# Patient Record
Sex: MALE | Race: WHITE | NOT HISPANIC OR LATINO | Employment: OTHER | URBAN - METROPOLITAN AREA
[De-identification: names, ages, dates, MRNs, and addresses within clinical notes are randomized per-mention and may not be internally consistent; named-entity substitution may affect disease eponyms.]

---

## 2017-08-17 ENCOUNTER — OFFICE VISIT (OUTPATIENT)
Dept: FAMILY MEDICINE CLINIC | Facility: CLINIC | Age: 62
End: 2017-08-17

## 2017-08-17 VITALS
SYSTOLIC BLOOD PRESSURE: 118 MMHG | HEIGHT: 67 IN | WEIGHT: 168 LBS | DIASTOLIC BLOOD PRESSURE: 72 MMHG | OXYGEN SATURATION: 96 % | HEART RATE: 82 BPM | BODY MASS INDEX: 26.37 KG/M2

## 2017-08-17 DIAGNOSIS — E78.5 HYPERLIPIDEMIA, UNSPECIFIED HYPERLIPIDEMIA TYPE: ICD-10-CM

## 2017-08-17 DIAGNOSIS — Z87.898 HISTORY OF ELEVATED PSA: ICD-10-CM

## 2017-08-17 DIAGNOSIS — L25.9 CONTACT DERMATITIS, UNSPECIFIED CONTACT DERMATITIS TYPE, UNSPECIFIED TRIGGER: Primary | ICD-10-CM

## 2017-08-17 DIAGNOSIS — Z86.39 HISTORY OF ELEVATED GLUCOSE: ICD-10-CM

## 2017-08-17 DIAGNOSIS — E03.9 ACQUIRED HYPOTHYROIDISM: ICD-10-CM

## 2017-08-17 PROCEDURE — 99203 OFFICE O/P NEW LOW 30 MIN: CPT | Performed by: FAMILY MEDICINE

## 2017-08-17 RX ORDER — TRIAMCINOLONE ACETONIDE 5 MG/G
OINTMENT TOPICAL 2 TIMES DAILY
Qty: 15 G | Refills: 1 | Status: SHIPPED | OUTPATIENT
Start: 2017-08-17 | End: 2017-10-02

## 2017-08-17 NOTE — PATIENT INSTRUCTIONS
Contact Dermatitis  Dermatitis is redness, soreness, and swelling (inflammation) of the skin. Contact dermatitis is a reaction to certain substances that touch the skin. There are two types of contact dermatitis:   · Irritant contact dermatitis. This type is caused by something that irritates your skin, such as dry hands from washing them too much. This type does not require previous exposure to the substance for a reaction to occur. This type is more common.  · Allergic contact dermatitis. This type is caused by a substance that you are allergic to, such as a nickel allergy or poison ivy. This type only occurs if you have been exposed to the substance (allergen) before. Upon a repeat exposure, your body reacts to the substance. This type is less common.  CAUSES   Many different substances can cause contact dermatitis. Irritant contact dermatitis is most commonly caused by exposure to:   · Makeup.    · Soaps.    · Detergents.    · Bleaches.    · Acids.    · Metal salts, such as nickel.    Allergic contact dermatitis is most commonly caused by exposure to:   · Poisonous plants.    · Chemicals.    · Jewelry.    · Latex.    · Medicines.    · Preservatives in products, such as clothing.    RISK FACTORS  This condition is more likely to develop in:   · People who have jobs that expose them to irritants or allergens.  · People who have certain medical conditions, such as asthma or eczema.    SYMPTOMS   Symptoms of this condition may occur anywhere on your body where the irritant has touched you or is touched by you. Symptoms include:  · Dryness or flaking.    · Redness.    · Cracks.    · Itching.    · Pain or a burning feeling.    · Blisters.  · Drainage of small amounts of blood or clear fluid from skin cracks.  With allergic contact dermatitis, there may also be swelling in areas such as the eyelids, mouth, or genitals.   DIAGNOSIS   This condition is diagnosed with a medical history and physical exam. A patch skin test  may be performed to help determine the cause. If the condition is related to your job, you may need to see an occupational medicine specialist.  TREATMENT  Treatment for this condition includes figuring out what caused the reaction and protecting your skin from further contact. Treatment may also include:   · Steroid creams or ointments. Oral steroid medicines may be needed in more severe cases.  · Antibiotics or antibacterial ointments, if a skin infection is present.  · Antihistamine lotion or an antihistamine taken by mouth to ease itching.  · A bandage (dressing).  HOME CARE INSTRUCTIONS  Skin Care   · Moisturize your skin as needed.    · Apply cool compresses to the affected areas.  · Try taking a bath with:    Epsom salts. Follow the instructions on the packaging. You can get these at your local pharmacy or grocery store.    Baking soda. Pour a small amount into the bath as directed by your health care provider.    Colloidal oatmeal. Follow the instructions on the packaging. You can get this at your local pharmacy or grocery store.  · Try applying baking soda paste to your skin. Stir water into baking soda until it reaches a paste-like consistency.  · Do not scratch your skin.  · Bathe less frequently, such as every other day.  · Bathe in lukewarm water. Avoid using hot water.  Medicines   · Take or apply over-the-counter and prescription medicines only as told by your health care provider.    · If you were prescribed an antibiotic medicine, take or apply your antibiotic as told by your health care provider. Do not stop using the antibiotic even if your condition starts to improve.  General Instructions   · Keep all follow-up visits as told by your health care provider. This is important.  · Avoid the substance that caused your reaction. If you do not know what caused it, keep a journal to try to track what caused it. Write down:    What you eat.    What cosmetic products you use.    What you drink.    What  you wear in the affected area. This includes jewelry.  · If you were given a dressing, take care of it as told by your health care provider. This includes when to change and remove it.  SEEK MEDICAL CARE IF:   · Your condition does not improve with treatment.  · Your condition gets worse.  · You have signs of infection such as swelling, tenderness, redness, soreness, or warmth in the affected area.  · You have a fever.  · You have new symptoms.  SEEK IMMEDIATE MEDICAL CARE IF:   · You have a severe headache, neck pain, or neck stiffness.  · You vomit.  · You feel very sleepy.  · You notice red streaks coming from the affected area.  · Your bone or joint underneath the affected area becomes painful after the skin has healed.  · The affected area turns darker.  · You have difficulty breathing.     This information is not intended to replace advice given to you by your health care provider. Make sure you discuss any questions you have with your health care provider.     Document Released: 12/15/2001 Document Revised: 09/07/2016 Document Reviewed: 05/04/2016  [employer] Interactive Patient Education ©2017 [employer]

## 2017-08-17 NOTE — PROGRESS NOTES
Subjective   Arash Roman is a 61 y.o. male. Patient is here today to establish care and this is the time of year he usually has his annual physical. Although he has a rash of unknown origin x2 months.    Rash: itchy red patch located on the R ankle x 2 months duration. Neosporin and avoiding socks have not helped.      Follow up from [facility]: pt went to [facility] on 7/13/17 for bronchitis.  He was treated with antibiotics and feels like his breathing has returned to normal now.      Hx of Depression, but mood stable now.  He took medication briefly about 1 yr ago, but no longer feels that this is necessary.  He has also completed a course of counseling.  NO SI or HI.  Comorbidities include hypothyroidism for which he takes Synthroid.      HLD: Did not tolerate Simvastatin 2/2 myalgias, but has tolerated Crestor well in the past.  He has noticed myalgias since switching to generic Crestor 1 yr ago and would like to get labs checked today before deciding whether to continue a statin.      Colonoscopy last year discovered some polyps; he usually gets a colonoscopy Q 2-3 yr  Last physical exam and labs were done in October 2017 at which time he had mildly elevated fasting glucose.  He also has a hx of a mild elevation in PSA, and requests having this lab value repeated.    Walks for exercise most days and does calisthenics for his lower back.  He enjoys exercising outdoors more than working out in a gym.      Chief Complaint   Patient presents with   • Establish Care     PCP changed practices   • Rash     right ankle and back of neck x2 months   • [facility] follow up          Vitals:    08/17/17 1453   BP: 118/72   Pulse: 82   SpO2: 96%     The following portions of the patient's history were reviewed and updated as appropriate: allergies, current medications, past family history, past medical history, past social history, past surgical history and problem list.    Past Medical History:   Diagnosis Date   •  Depression    • Hypercholesteremia    • Hypothyroidism    • Peyronie's disease    • Rising PSA level      Past Surgical History:   Procedure Laterality Date   • COLONOSCOPY N/A 5/12/2016    Procedure: COLONOSCOPY to cecum/TI; polypectomy(cold bx);  Surgeon: Juno Steven MD;  Location: [facility] ENDOSCOPY;  Service:    • TONSILLECTOMY       Family History   Problem Relation Age of Onset   • Colon polyps Father    • Kidney disease Mother    • Alcohol abuse Mother      Social History     Social History   • Marital status:      Spouse name: N/A   • Number of children: N/A   • Years of education: N/A     Occupational History   • Not on file.     Social History Main Topics   • Smoking status: Never Smoker   • Smokeless tobacco: Never Used   • Alcohol use Yes      Comment: socially, 1-3 x per week   • Drug use: Defer   • Sexual activity: Yes     Partners: Female     Other Topics Concern   • Not on file     Social History Narrative    Lives at home with his wife and a Kasey Barrios named Fatemeh.  Works as an ,  at [employer].        History   Sexual Activity   • Sexual activity: Yes   • Partners: Female       No Known Allergies     New Medications Ordered This Visit   Medications   • triamcinolone (KENALOG) 0.5 % ointment     Sig: Apply  topically 2 (Two) Times a Day.     Dispense:  15 g     Refill:  1     Current Outpatient Prescriptions on File Prior to Visit   Medication Sig Dispense Refill   • Azelastine-Fluticasone 137-50 MCG/ACT suspension 1 spray into each nostril daily. (Patient taking differently: 1 spray into each nostril Daily As Needed.) 23 g 11   • levothyroxine (SYNTHROID, LEVOTHROID) 50 MCG tablet TAKE 1 TABLET BY MOUTH DAILY 30 tablet 5   • rosuvastatin (CRESTOR) 5 MG tablet TAKE 1 TABLET BY MOUTH EVERY DAY 90 tablet 3     No current facility-administered medications on file prior to visit.        Objective     Rash   Pertinent negatives include no congestion, cough, fatigue,  fever, rhinorrhea, shortness of breath or sore throat.        Review of Systems   Constitutional: Negative for appetite change, fatigue, fever and unexpected weight change.   HENT: Negative for congestion, rhinorrhea and sore throat.    Respiratory: Negative for cough, chest tightness and shortness of breath.    Cardiovascular: Negative for chest pain and palpitations.   Gastrointestinal: Negative for abdominal pain, blood in stool and nausea.   Musculoskeletal: Positive for myalgias. Negative for gait problem.   Skin: Positive for rash. Negative for pallor.   Neurological: Negative for dizziness, weakness and headaches.   Psychiatric/Behavioral: Negative for dysphoric mood, sleep disturbance and suicidal ideas. The patient is not nervous/anxious.    All other systems reviewed and are negative.      Physical Exam   Constitutional: He is oriented to person, place, and time. Vital signs are normal. He appears well-developed and well-nourished. No distress.   HENT:   Head: Normocephalic and atraumatic.   Nose: Nose normal.   Mouth/Throat: Oropharynx is clear and moist.   Eyes: Conjunctivae are normal. Pupils are equal, round, and reactive to light.   Neck: Normal range of motion. Neck supple. No thyromegaly present.   Cardiovascular: Normal rate, regular rhythm, S1 normal, S2 normal and normal heart sounds.  Exam reveals no gallop.    No murmur heard.  Pulses:       Radial pulses are 2+ on the right side        Dorsalis pedis pulses are 2+ on the right side   Pulmonary/Chest: Effort normal and breath sounds normal. He has no wheezes. He has no rales. He exhibits no tenderness.   Abdominal: Soft. Bowel sounds are normal. He exhibits no distension. There is no hepatosplenomegaly. There is no tenderness. There is no rebound and no guarding.   Musculoskeletal: He exhibits no edema.   Lymphadenopathy:     He has no cervical adenopathy.   Neurological: He is alert and oriented to person, place, and time. He has normal  strength. Gait normal.   Skin: Skin is warm and dry. No cyanosis. Nails show no clubbing.   Well demarcated erythematous patch over lateral aspect of R ankle with scattered peripheral excoriations   Psychiatric: He has a normal mood and affect. His speech is normal and behavior is normal.   Nursing note and vitals reviewed.      ASSESSMENT/PLAN     Problem List Items Addressed This Visit        Cardiovascular and Mediastinum    Hyperlipidemia    Relevant Orders    Lipid Panel With LDL / HDL Ratio       Endocrine    Hypothyroidism    Relevant Orders    TSH      Other Visit Diagnoses     Contact dermatitis, unspecified contact dermatitis type, unspecified trigger    -  Primary    Relevant Medications    triamcinolone (KENALOG) 0.5 % ointment    History of elevated glucose        Relevant Orders    Comprehensive Metabolic Panel    Hemoglobin A1c    History of elevated PSA        Relevant Orders    PSA          Patient Instructions   Contact Dermatitis  Dermatitis is redness, soreness, and swelling (inflammation) of the skin. Contact dermatitis is a reaction to certain substances that touch the skin. There are two types of contact dermatitis:   · Irritant contact dermatitis. This type is caused by something that irritates your skin, such as dry hands from washing them too much. This type does not require previous exposure to the substance for a reaction to occur. This type is more common.  · Allergic contact dermatitis. This type is caused by a substance that you are allergic to, such as a nickel allergy or poison ivy. This type only occurs if you have been exposed to the substance (allergen) before. Upon a repeat exposure, your body reacts to the substance. This type is less common.  CAUSES   Many different substances can cause contact dermatitis. Irritant contact dermatitis is most commonly caused by exposure to:   · Makeup.    · Soaps.    · Detergents.    · Bleaches.    · Acids.    · Metal salts, such as nickel.     Allergic contact dermatitis is most commonly caused by exposure to:   · Poisonous plants.    · Chemicals.    · Jewelry.    · Latex.    · Medicines.    · Preservatives in products, such as clothing.    RISK FACTORS  This condition is more likely to develop in:   · People who have jobs that expose them to irritants or allergens.  · People who have certain medical conditions, such as asthma or eczema.    SYMPTOMS   Symptoms of this condition may occur anywhere on your body where the irritant has touched you or is touched by you. Symptoms include:  · Dryness or flaking.    · Redness.    · Cracks.    · Itching.    · Pain or a burning feeling.    · Blisters.  · Drainage of small amounts of blood or clear fluid from skin cracks.  With allergic contact dermatitis, there may also be swelling in areas such as the eyelids, mouth, or genitals.   DIAGNOSIS   This condition is diagnosed with a medical history and physical exam. A patch skin test may be performed to help determine the cause. If the condition is related to your job, you may need to see an occupational medicine specialist.  TREATMENT  Treatment for this condition includes figuring out what caused the reaction and protecting your skin from further contact. Treatment may also include:   · Steroid creams or ointments. Oral steroid medicines may be needed in more severe cases.  · Antibiotics or antibacterial ointments, if a skin infection is present.  · Antihistamine lotion or an antihistamine taken by mouth to ease itching.  · A bandage (dressing).  HOME CARE INSTRUCTIONS  Skin Care   · Moisturize your skin as needed.    · Apply cool compresses to the affected areas.  · Try taking a bath with:    Epsom salts. Follow the instructions on the packaging. You can get these at your local pharmacy or grocery store.    Baking soda. Pour a small amount into the bath as directed by your health care provider.    Colloidal oatmeal. Follow the instructions on the packaging. You  can get this at your local pharmacy or grocery store.  · Try applying baking soda paste to your skin. Stir water into baking soda until it reaches a paste-like consistency.  · Do not scratch your skin.  · Bathe less frequently, such as every other day.  · Bathe in lukewarm water. Avoid using hot water.  Medicines   · Take or apply over-the-counter and prescription medicines only as told by your health care provider.    · If you were prescribed an antibiotic medicine, take or apply your antibiotic as told by your health care provider. Do not stop using the antibiotic even if your condition starts to improve.  General Instructions   · Keep all follow-up visits as told by your health care provider. This is important.  · Avoid the substance that caused your reaction. If you do not know what caused it, keep a journal to try to track what caused it. Write down:    What you eat.    What cosmetic products you use.    What you drink.    What you wear in the affected area. This includes jewelry.  · If you were given a dressing, take care of it as told by your health care provider. This includes when to change and remove it.  SEEK MEDICAL CARE IF:   · Your condition does not improve with treatment.  · Your condition gets worse.  · You have signs of infection such as swelling, tenderness, redness, soreness, or warmth in the affected area.  · You have a fever.  · You have new symptoms.  SEEK IMMEDIATE MEDICAL CARE IF:   · You have a severe headache, neck pain, or neck stiffness.  · You vomit.  · You feel very sleepy.  · You notice red streaks coming from the affected area.  · Your bone or joint underneath the affected area becomes painful after the skin has healed.  · The affected area turns darker.  · You have difficulty breathing.     This information is not intended to replace advice given to you by your health care provider. Make sure you discuss any questions you have with your health care provider.     Document Released:  12/15/2001 Document Revised: 09/07/2016 Document Reviewed: 05/04/2016  [employer] Interactive Patient Education ©2017 [employer]      Return in about 1 month (around 9/17/2017) for Annual.       Katty Watkins MD  08/20/17

## 2017-09-18 ENCOUNTER — TELEPHONE (OUTPATIENT)
Dept: FAMILY MEDICINE CLINIC | Facility: CLINIC | Age: 62
End: 2017-09-18

## 2017-09-18 RX ORDER — CLOBETASOL PROPIONATE 0.5 MG/G
OINTMENT TOPICAL 2 TIMES DAILY
Qty: 15 G | Refills: 0 | Status: SHIPPED | OUTPATIENT
Start: 2017-09-18 | End: 2019-07-01

## 2017-09-18 NOTE — TELEPHONE ENCOUNTER
Pt called to report triamcinolone cream is not helping his ankle rash.  He requests a different Rx be called in.  Electronic Rx sent for topical clobetasol.  Pt advised to call clinic in 1 week if rash not resolved, at which time we will refer to Dermatology.

## 2017-09-27 LAB
ALBUMIN SERPL-MCNC: 4.6 G/DL (ref 3.5–5.2)
ALBUMIN/GLOB SERPL: 1.9 G/DL
ALP SERPL-CCNC: 60 U/L (ref 39–117)
ALT SERPL-CCNC: 34 U/L (ref 1–41)
AST SERPL-CCNC: 18 U/L (ref 1–40)
BILIRUB SERPL-MCNC: 0.5 MG/DL (ref 0.1–1.2)
BUN SERPL-MCNC: 17 MG/DL (ref 8–23)
BUN/CREAT SERPL: 16.7 (ref 7–25)
CALCIUM SERPL-MCNC: 9.5 MG/DL (ref 8.6–10.5)
CHLORIDE SERPL-SCNC: 107 MMOL/L (ref 98–107)
CHOLEST SERPL-MCNC: 158 MG/DL (ref 0–200)
CO2 SERPL-SCNC: 24.7 MMOL/L (ref 22–29)
CREAT SERPL-MCNC: 1.02 MG/DL (ref 0.76–1.27)
GLOBULIN SER CALC-MCNC: 2.4 GM/DL
GLUCOSE SERPL-MCNC: 108 MG/DL (ref 65–99)
HBA1C MFR BLD: 5.1 % (ref 4.8–5.6)
HDLC SERPL-MCNC: 42 MG/DL (ref 40–60)
LDLC SERPL CALC-MCNC: 92 MG/DL (ref 0–100)
LDLC/HDLC SERPL: 2.2 {RATIO}
POTASSIUM SERPL-SCNC: 4.3 MMOL/L (ref 3.5–5.2)
PROT SERPL-MCNC: 7 G/DL (ref 6–8.5)
PSA SERPL-MCNC: 1.66 NG/ML (ref 0–4)
SODIUM SERPL-SCNC: 145 MMOL/L (ref 136–145)
TRIGL SERPL-MCNC: 118 MG/DL (ref 0–150)
TSH SERPL DL<=0.005 MIU/L-ACNC: 4.34 MIU/ML (ref 0.27–4.2)
VLDLC SERPL CALC-MCNC: 23.6 MG/DL (ref 5–40)

## 2017-09-29 ENCOUNTER — RESULTS ENCOUNTER (OUTPATIENT)
Dept: FAMILY MEDICINE CLINIC | Facility: CLINIC | Age: 62
End: 2017-09-29

## 2017-09-29 ENCOUNTER — TELEPHONE (OUTPATIENT)
Dept: FAMILY MEDICINE CLINIC | Facility: CLINIC | Age: 62
End: 2017-09-29

## 2017-09-29 DIAGNOSIS — E78.5 HYPERLIPIDEMIA, UNSPECIFIED HYPERLIPIDEMIA TYPE: ICD-10-CM

## 2017-09-29 DIAGNOSIS — Z86.39 HISTORY OF ELEVATED GLUCOSE: ICD-10-CM

## 2017-09-29 DIAGNOSIS — E03.9 ACQUIRED HYPOTHYROIDISM: ICD-10-CM

## 2017-09-29 DIAGNOSIS — Z87.898 HISTORY OF ELEVATED PSA: ICD-10-CM

## 2017-09-29 NOTE — TELEPHONE ENCOUNTER
Spoke with Mr. Roman about his lab and he expressed understanding and will be in the office on Monday for his appointment and have the follow up testing done at that time.

## 2017-09-29 NOTE — TELEPHONE ENCOUNTER
----- Message from Katty Watkins MD sent at 9/28/2017  3:07 PM EDT -----  Please call Mr. Roman and let him know that his thyroid hormone levels are a little out of balance, and that we will need to do some follow up blood work at his next appointment on 10/2/17.  His random blood sugar was a little high, but the hemoglobin A1C, which is a more sensitive test for diabetes was normal, so he is not a diabetic.  His prostate specific antigen level was normal and stable compared to last year.  Cholesterol, liver enzymes, and kidney function looked good.

## 2017-10-02 ENCOUNTER — OFFICE VISIT (OUTPATIENT)
Dept: FAMILY MEDICINE CLINIC | Facility: CLINIC | Age: 62
End: 2017-10-02

## 2017-10-02 VITALS
OXYGEN SATURATION: 98 % | WEIGHT: 165 LBS | SYSTOLIC BLOOD PRESSURE: 112 MMHG | HEART RATE: 70 BPM | HEIGHT: 67 IN | BODY MASS INDEX: 25.9 KG/M2 | DIASTOLIC BLOOD PRESSURE: 70 MMHG

## 2017-10-02 DIAGNOSIS — E03.9 HYPOTHYROIDISM, UNSPECIFIED TYPE: ICD-10-CM

## 2017-10-02 DIAGNOSIS — R21 RASH: ICD-10-CM

## 2017-10-02 DIAGNOSIS — Z00.00 ANNUAL PHYSICAL EXAM: Primary | ICD-10-CM

## 2017-10-02 DIAGNOSIS — Z23 NEED FOR VACCINATION: ICD-10-CM

## 2017-10-02 PROCEDURE — 90736 HZV VACCINE LIVE SUBQ: CPT | Performed by: FAMILY MEDICINE

## 2017-10-02 PROCEDURE — 90471 IMMUNIZATION ADMIN: CPT | Performed by: FAMILY MEDICINE

## 2017-10-02 PROCEDURE — 99396 PREV VISIT EST AGE 40-64: CPT | Performed by: FAMILY MEDICINE

## 2017-10-02 PROCEDURE — 90472 IMMUNIZATION ADMIN EACH ADD: CPT | Performed by: FAMILY MEDICINE

## 2017-10-02 PROCEDURE — 90686 IIV4 VACC NO PRSV 0.5 ML IM: CPT | Performed by: FAMILY MEDICINE

## 2017-10-02 RX ORDER — MELATONIN
1000 DAILY
COMMUNITY

## 2017-10-02 RX ORDER — CETIRIZINE HYDROCHLORIDE 10 MG/1
10 TABLET ORAL DAILY
COMMUNITY

## 2017-10-02 RX ORDER — LEVOTHYROXINE SODIUM 0.07 MG/1
75 TABLET ORAL DAILY
Qty: 30 TABLET | Refills: 2 | Status: SHIPPED | OUTPATIENT
Start: 2017-10-02 | End: 2017-12-26 | Stop reason: SDUPTHER

## 2017-10-02 NOTE — PATIENT INSTRUCTIONS
Hypothyroidism  Hypothyroidism is a disorder of the thyroid. The thyroid is a large gland that is located in the lower front of the neck. The thyroid releases hormones that control how the body works. With hypothyroidism, the thyroid does not make enough of these hormones.  CAUSES  Causes of hypothyroidism may include:  · Viral infections.  · Pregnancy.  · Your own defense system (immune system) attacking your thyroid.  · Certain medicines.  · Birth defects.  · Past radiation treatments to your head or neck.  · Past treatment with radioactive iodine.  · Past surgical removal of part or all of your thyroid.  · Problems with the gland that is located in the center of your brain (pituitary).  SIGNS AND SYMPTOMS  Signs and symptoms of hypothyroidism may include:  · Feeling as though you have no energy (lethargy).  · Inability to tolerate cold.  · Weight gain that is not explained by a change in diet or exercise habits.  · Dry skin.  · Coarse hair.  · Menstrual irregularity.  · Slowing of thought processes.  · Constipation.  · Sadness or depression.  DIAGNOSIS   Your health care provider may diagnose hypothyroidism with blood tests and ultrasound tests.  TREATMENT  Hypothyroidism is treated with medicine that replaces the hormones that your body does not make. After you begin treatment, it may take several weeks for symptoms to go away.  HOME CARE INSTRUCTIONS   · Take medicines only as directed by your health care provider.  · If you start taking any new medicines, tell your health care provider.  · Keep all follow-up visits as directed by your health care provider. This is important. As your condition improves, your dosage needs may change. You will need to have blood tests regularly so that your health care provider can watch your condition.  SEEK MEDICAL CARE IF:  · Your symptoms do not get better with treatment.  · You are taking thyroid replacement medicine and:    You sweat excessively.    You have tremors.    You  feel anxious.    You lose weight rapidly.    You cannot tolerate heat.    You have emotional swings.    You have diarrhea.    You feel weak.  SEEK IMMEDIATE MEDICAL CARE IF:   · You develop chest pain.  · You develop an irregular heartbeat.  · You develop a rapid heartbeat.     This information is not intended to replace advice given to you by your health care provider. Make sure you discuss any questions you have with your health care provider.     Document Released: 12/18/2006 Document Revised: 01/08/2016 Document Reviewed: 05/05/2015  [employer] Interactive Patient Education ©2017 [employer]      Preventive Care 40-64 Years, Male  Preventive care refers to lifestyle choices and visits with your health care provider that can promote health and wellness.  WHAT DOES PREVENTIVE CARE INCLUDE?  · A yearly physical exam. This is also called an annual well check.  · Dental exams once or twice a year.  · Routine eye exams. Ask your health care provider how often you should have your eyes checked.  · Personal lifestyle choices, including:    Daily care of your teeth and gums.    Regular physical activity.    Eating a healthy diet.    Avoiding tobacco and drug use.    Limiting alcohol use.    Practicing safe sex.    Taking low-dose aspirin every day starting at age 50.  WHAT HAPPENS DURING AN ANNUAL WELL CHECK?  The services and screenings done by your health care provider during your annual well check will depend on your age, overall health, lifestyle risk factors, and family history of disease.  Counseling  Your health care provider may ask you questions about your:  · Alcohol use.  · Tobacco use.  · Drug use.  · Emotional well-being.  · Home and relationship well-being.  · Sexual activity.  · Eating habits.  · Work and work environment.  Screening  You may have the following tests or measurements:  · Height, weight, and BMI.  · Blood pressure.  · Lipid and cholesterol levels. These may be checked every 5 years, or more  frequently if you are over 50 years old.  · Skin check.  · Lung cancer screening. You may have this screening every year starting at age 55 if you have a 30-pack-year history of smoking and currently smoke or have quit within the past 15 years.  · Fecal occult blood test (FOBT) of the stool. You may have this test every year starting at age 50.  · Flexible sigmoidoscopy or colonoscopy. You may have a sigmoidoscopy every 5 years or a colonoscopy every 10 years starting at age 50.  · Prostate cancer screening. Recommendations will vary depending on your family history and other risks.  · Hepatitis C blood test.  · Hepatitis B blood test.  · Sexually transmitted disease (STD) testing.  · Diabetes screening. This is done by checking your blood sugar (glucose) after you have not eaten for a while (fasting). You may have this done every 1-3 years.  Discuss your test results, treatment options, and if necessary, the need for more tests with your health care provider.  Vaccines  Your health care provider may recommend certain vaccines, such as:  · Influenza vaccine. This is recommended every year.  · Tetanus, diphtheria, and acellular pertussis (Tdap, Td) vaccine. You may need a Td booster every 10 years.  · Varicella vaccine. You may need this if you have not been vaccinated.  · Zoster vaccine. You may need this after age 60.  · Measles, mumps, and rubella (MMR) vaccine. You may need at least one dose of MMR if you were born in 1957 or later. You may also need a second dose.  · Pneumococcal 13-valent conjugate (PCV13) vaccine. You may need this if you have certain conditions and have not been vaccinated.  · Pneumococcal polysaccharide (PPSV23) vaccine. You may need one or two doses if you smoke cigarettes or if you have certain conditions.  · Meningococcal vaccine. You may need this if you have certain conditions.  · Hepatitis A vaccine. You may need this if you have certain conditions or if you travel or work in places  where you may be exposed to hepatitis A.  · Hepatitis B vaccine. You may need this if you have certain conditions or if you travel or work in places where you may be exposed to hepatitis B.  · Haemophilus influenzae type b (Hib) vaccine. You may need this if you have certain risk factors.  Talk to your health care provider about which screenings and vaccines you need and how often you need them.     This information is not intended to replace advice given to you by your health care provider. Make sure you discuss any questions you have with your health care provider.     Document Released: 01/13/2017 Document Reviewed: 01/13/2017  [employer] Interactive Patient Education ©2017 [employer]

## 2017-10-12 ENCOUNTER — HOSPITAL ENCOUNTER (OUTPATIENT)
Dept: ULTRASOUND IMAGING | Facility: HOSPITAL | Age: 62
Discharge: HOME | End: 2017-10-12
Admitting: FAMILY MEDICINE

## 2017-10-12 DIAGNOSIS — E03.9 HYPOTHYROIDISM, UNSPECIFIED TYPE: ICD-10-CM

## 2017-10-12 PROCEDURE — 76536 US EXAM OF HEAD AND NECK: CPT

## 2017-12-18 ENCOUNTER — OFFICE VISIT (OUTPATIENT)
Dept: FAMILY MEDICINE CLINIC | Facility: CLINIC | Age: 62
End: 2017-12-18

## 2017-12-18 VITALS
HEIGHT: 67 IN | OXYGEN SATURATION: 98 % | SYSTOLIC BLOOD PRESSURE: 120 MMHG | BODY MASS INDEX: 26.68 KG/M2 | WEIGHT: 170 LBS | HEART RATE: 72 BPM | DIASTOLIC BLOOD PRESSURE: 70 MMHG

## 2017-12-18 DIAGNOSIS — M75.02 ADHESIVE CAPSULITIS OF LEFT SHOULDER: ICD-10-CM

## 2017-12-18 DIAGNOSIS — E03.9 ACQUIRED HYPOTHYROIDISM: Primary | ICD-10-CM

## 2017-12-18 PROCEDURE — 99213 OFFICE O/P EST LOW 20 MIN: CPT | Performed by: FAMILY MEDICINE

## 2017-12-18 NOTE — PROGRESS NOTES
Subjective   Arash Roman is a 62 y.o. male here for 2MO. re-evaluation for hypothyroidism and left shoulder pain.  Patient states since we adjusted his medication 2MO. ago he has noticed an increase in energy, but he is still gaining weight.  Patient complains of left shoulder pain when lifting or moving his arm. Patient states he develops a sharp pain when moving certain directions. He hasn't taken any OTC medications to help with his symptoms.    Chief Complaint   Patient presents with   • Hypothyroidism   • Shoulder Pain       HPI     Hypothyroidism:  -levothyroxine dose increased from 50 to 75 mg daily about 2 months ago  -energy improved, but could still be better  -he has been needing fewer naps   -however weight gain has continued  -thyroid US showed nonspecific heterogeneity but NO nodules on 10/2/17    L shoulder pain:  -sharp intermittent lateral shoulder pain  -worsened with putting on a jacket or shirt  -no acute injuries or repetetive motion stress  -he has a hx of frozen shoulder on the R side for which he was treated by a Sports Medicine Physician about 3 yr ago with manipulation under anasthesia    Leg rash essentially resolved with a prolonged course of topical Clobetasol and regular moisturization.  Pt is following up with Dermatology regularly       Review of Systems   Constitutional: Positive for fatigue and unexpected weight change (5 lb gain). Negative for fever.   HENT: Negative for congestion and sore throat.    Respiratory: Negative for cough and shortness of breath.    Cardiovascular: Negative for chest pain and leg swelling.   Gastrointestinal: Negative for abdominal pain and nausea.   Musculoskeletal: Positive for arthralgias (L shoulder pain). Negative for back pain, joint swelling, myalgias, neck pain and neck stiffness.        Shoulder Pain.   Skin: Negative for rash and wound.   Neurological: Negative for dizziness and light-headedness.   All other systems reviewed and are  negative.      The following portions of the patient's history were reviewed and updated as appropriate: allergies, current medications, past family history, past medical history, past social history, past surgical history and problem list.    Past Medical History:   Diagnosis Date   • Depression    • Frozen shoulder    • Hypercholesteremia    • Hypothyroidism    • Peyronie's disease      Past Surgical History:   Procedure Laterality Date   • COLONOSCOPY N/A 5/12/2016    Procedure: COLONOSCOPY to cecum/TI; polypectomy(cold bx);  Surgeon: Juno Steven MD;  Location: [facility] ENDOSCOPY;  Service:    • TONSILLECTOMY       Family History   Problem Relation Age of Onset   • Colon polyps Father    • Kidney disease Mother    • Alcohol abuse Mother    • Heart disease Maternal Uncle      Social History     Social History   • Marital status:        Social History Main Topics   • Smoking status: Never Smoker   • Smokeless tobacco: Never Used   • Alcohol use Yes      Comment: socially, 1-3 x per week   • Drug use: No   • Sexual activity: Yes     Partners: Female     Social History Narrative    Lives at home with his wife and a Kasey Genaoiel named Fatemeh.  Works as an ,  at [employer].          No Known Allergies     Outpatient Medications Prior to Visit   Medication Sig Dispense Refill   • Azelastine-Fluticasone 137-50 MCG/ACT suspension 1 spray into each nostril daily. (Patient taking differently: 1 spray into each nostril Daily As Needed.) 23 g 11   • cetirizine (zyrTEC) 10 MG tablet Take 10 mg by mouth Daily.     • cholecalciferol (VITAMIN D3) 1000 units tablet Take 1,000 Units by mouth Daily.     • clobetasol (TEMOVATE) 0.05 % ointment Apply  topically 2 (Two) Times a Day. Do not apply to face or genitals 15 g 0   • levothyroxine (SYNTHROID, LEVOTHROID) 75 MCG tablet Take 1 tablet by mouth Daily. 30 tablet 2   • rosuvastatin (CRESTOR) 5 MG tablet TAKE 1 TABLET BY MOUTH EVERY DAY 90 tablet 3      No facility-administered medications prior to visit.        Objective     Vitals:    12/18/17 1420   BP: 120/70   Pulse: 72   SpO2: 98%   RR 14    Physical Exam   Constitutional: Vital signs are normal. He appears well-developed and well-nourished. No distress.   HENT:   Head: Normocephalic and atraumatic.   Cardiovascular: Normal rate, regular rhythm, S1 normal, S2 normal and normal heart sounds.  Exam reveals no gallop.    No murmur heard.  Pulses:       Radial pulses are 2+ on the right side, and 2+ on the left side.   Pulmonary/Chest: Effort normal and breath sounds normal. He has no wheezes. He has no rales. He exhibits no tenderness.   Abdominal: Soft. Bowel sounds are normal. He exhibits no distension. There is no hepatosplenomegaly. There is no tenderness. There is no rebound and no guarding.   Musculoskeletal:        Right shoulder: He exhibits decreased range of motion (mild decrease with flexion and extension). He exhibits no tenderness, no bony tenderness, no swelling, no effusion, no crepitus, no deformity, no pain and normal strength.        Left shoulder: He exhibits decreased range of motion (decreased extension, flexion, internal, and external rotation). He exhibits no bony tenderness, no swelling, no effusion, no crepitus, no deformity, no pain, no spasm and normal strength.        Cervical back: Normal.   Negative empty can, scarf, and Neer's tests bilaterally.  No TTP of biceps tendons bilaterally.     Neurological: He has normal strength. Gait normal.   Skin: Skin is warm and dry. No cyanosis. Nails show no clubbing.   Psychiatric: He has a normal mood and affect. His speech is normal and behavior is normal.   Nursing note and vitals reviewed.      ASSESSMENT/PLAN       Problem List Items Addressed This Visit        Endocrine    Hypothyroidism - Primary    Relevant Orders    Continue levothyroxine 75 mg daily  Repeat TSH to assess adequacy of dose       Musculoskeletal and Integument     Adhesive capsulitis of left shoulder, early, developing          Pt declines Rx for meloxicam and referral to PT or Sports Medicine at this time but knows that he can call clinic to request any of these treatment modalities if he changes his mind before his next appointment    Patient Instructions   Try using an icepack and doing some shoulder stretching exercises at home twice daily    Adhesive Capsulitis  Adhesive capsulitis is inflammation of the tendons and ligaments that surround the shoulder joint (shoulder capsule). This condition causes the shoulder to become stiff and painful to move. Adhesive capsulitis is also called frozen shoulder.  CAUSES  This condition may be caused by:  · An injury to the shoulder joint.  · Straining the shoulder.  · Not moving the shoulder for a period of time. This can happen if your arm was injured or in a sling.  · Long-standing health problems, such as:    Diabetes.    Thyroid problems.    Heart disease.    Stroke.    Rheumatoid arthritis.    Lung disease.  In some cases, the cause may not be known.  RISK FACTORS  This condition is more likely to develop in:  · Women.  · People who are older than 40 years of age.  SYMPTOMS  Symptoms of this condition include:  · Pain in the shoulder when moving the arm. There may also be pain when parts of the shoulder are touched. The pain is worse at night or when at rest.  · Soreness or aching in the shoulder.  · Inability to move the shoulder normally.  · Muscle spasms.  DIAGNOSIS  This condition is diagnosed with a physical exam and imaging tests, such as an X-ray or MRI.  TREATMENT  This condition may be treated with:  · Treatment of the underlying cause or condition.  · Physical therapy. This involves performing exercises to get the shoulder moving again.  · Medicine. Medicine may be given to relieve pain, inflammation, or muscle spasms.  · Steroid injections into the shoulder joint.  · Shoulder manipulation. This is a procedure to move  the shoulder into another position. It is done after you are given a medicine to make you fall asleep (general anesthetic). The joint may also be injected with salt water at high pressure to break down scarring.  · Surgery. This may be done in severe cases when other treatments have failed.  Although most people recover completely from adhesive capsulitis, some may not regain the full movement of the shoulder.  HOME CARE INSTRUCTIONS  · Take over-the-counter and prescription medicines only as told by your health care provider.  · If you are being treated with physical therapy, follow instructions from your physical therapist.  · Avoid exercises that put a lot of demand on your shoulder, such as throwing. These exercises can make pain worse.  · If directed, apply ice to the injured area:    Put ice in a plastic bag.    Place a towel between your skin and the bag.    Leave the ice on for 20 minutes, 2-3 times per day.  SEEK MEDICAL CARE IF:  · You develop new symptoms.  · Your symptoms get worse.     This information is not intended to replace advice given to you by your health care provider. Make sure you discuss any questions you have with your health care provider.     Document Released: 10/15/2010 Document Revised: 09/07/2016 Document Reviewed: 04/11/2016  [employer] Interactive Patient Education ©2017 [employer]      Return in about 3 months (around 3/18/2018) for Recheck thyroid.      Katty Watkins MD  12/18/17

## 2017-12-18 NOTE — PATIENT INSTRUCTIONS
Try using an icepack and doing some shoulder stretching exercises at home twice daily    Adhesive Capsulitis  Adhesive capsulitis is inflammation of the tendons and ligaments that surround the shoulder joint (shoulder capsule). This condition causes the shoulder to become stiff and painful to move. Adhesive capsulitis is also called frozen shoulder.  CAUSES  This condition may be caused by:  · An injury to the shoulder joint.  · Straining the shoulder.  · Not moving the shoulder for a period of time. This can happen if your arm was injured or in a sling.  · Long-standing health problems, such as:    Diabetes.    Thyroid problems.    Heart disease.    Stroke.    Rheumatoid arthritis.    Lung disease.  In some cases, the cause may not be known.  RISK FACTORS  This condition is more likely to develop in:  · Women.  · People who are older than 40 years of age.  SYMPTOMS  Symptoms of this condition include:  · Pain in the shoulder when moving the arm. There may also be pain when parts of the shoulder are touched. The pain is worse at night or when at rest.  · Soreness or aching in the shoulder.  · Inability to move the shoulder normally.  · Muscle spasms.  DIAGNOSIS  This condition is diagnosed with a physical exam and imaging tests, such as an X-ray or MRI.  TREATMENT  This condition may be treated with:  · Treatment of the underlying cause or condition.  · Physical therapy. This involves performing exercises to get the shoulder moving again.  · Medicine. Medicine may be given to relieve pain, inflammation, or muscle spasms.  · Steroid injections into the shoulder joint.  · Shoulder manipulation. This is a procedure to move the shoulder into another position. It is done after you are given a medicine to make you fall asleep (general anesthetic). The joint may also be injected with salt water at high pressure to break down scarring.  · Surgery. This may be done in severe cases when other treatments have failed.  Although  most people recover completely from adhesive capsulitis, some may not regain the full movement of the shoulder.  HOME CARE INSTRUCTIONS  · Take over-the-counter and prescription medicines only as told by your health care provider.  · If you are being treated with physical therapy, follow instructions from your physical therapist.  · Avoid exercises that put a lot of demand on your shoulder, such as throwing. These exercises can make pain worse.  · If directed, apply ice to the injured area:    Put ice in a plastic bag.    Place a towel between your skin and the bag.    Leave the ice on for 20 minutes, 2-3 times per day.  SEEK MEDICAL CARE IF:  · You develop new symptoms.  · Your symptoms get worse.     This information is not intended to replace advice given to you by your health care provider. Make sure you discuss any questions you have with your health care provider.     Document Released: 10/15/2010 Document Revised: 09/07/2016 Document Reviewed: 04/11/2016  [employer] Interactive Patient Education ©2017 [employer]

## 2017-12-19 LAB — TSH SERPL DL<=0.005 MIU/L-ACNC: 1.6 UIU/ML (ref 0.45–4.5)

## 2017-12-20 ENCOUNTER — TELEPHONE (OUTPATIENT)
Dept: FAMILY MEDICINE CLINIC | Facility: CLINIC | Age: 62
End: 2017-12-20

## 2017-12-20 NOTE — TELEPHONE ENCOUNTER
Called and spoke with patient to let him know his TSH was normal.     ----- Message from Katty Watkins MD sent at 12/19/2017  5:17 PM EST -----  Please contact Arash Roman and let pt know all labs looked good.

## 2017-12-26 DIAGNOSIS — E03.9 HYPOTHYROIDISM, UNSPECIFIED TYPE: ICD-10-CM

## 2017-12-26 RX ORDER — LEVOTHYROXINE SODIUM 0.07 MG/1
TABLET ORAL
Qty: 30 TABLET | Refills: 1 | Status: SHIPPED | OUTPATIENT
Start: 2017-12-26 | End: 2018-02-19 | Stop reason: SDUPTHER

## 2018-01-10 RX ORDER — ROSUVASTATIN CALCIUM 5 MG/1
TABLET, COATED ORAL
Qty: 90 TABLET | Refills: 0 | Status: SHIPPED | OUTPATIENT
Start: 2018-01-10 | End: 2018-03-19 | Stop reason: SDUPTHER

## 2018-02-19 DIAGNOSIS — E03.9 HYPOTHYROIDISM, UNSPECIFIED TYPE: ICD-10-CM

## 2018-02-19 RX ORDER — LEVOTHYROXINE SODIUM 0.07 MG/1
TABLET ORAL
Qty: 30 TABLET | Refills: 0 | Status: SHIPPED | OUTPATIENT
Start: 2018-02-19 | End: 2018-03-19 | Stop reason: SDUPTHER

## 2018-03-19 ENCOUNTER — OFFICE VISIT (OUTPATIENT)
Dept: FAMILY MEDICINE CLINIC | Facility: CLINIC | Age: 63
End: 2018-03-19

## 2018-03-19 VITALS
SYSTOLIC BLOOD PRESSURE: 122 MMHG | DIASTOLIC BLOOD PRESSURE: 86 MMHG | HEART RATE: 65 BPM | RESPIRATION RATE: 14 BRPM | BODY MASS INDEX: 27.94 KG/M2 | WEIGHT: 178 LBS | OXYGEN SATURATION: 99 % | HEIGHT: 67 IN

## 2018-03-19 DIAGNOSIS — R53.82 CHRONIC FATIGUE: ICD-10-CM

## 2018-03-19 DIAGNOSIS — E78.5 HYPERLIPIDEMIA, UNSPECIFIED HYPERLIPIDEMIA TYPE: ICD-10-CM

## 2018-03-19 DIAGNOSIS — Z23 NEED FOR VACCINATION: ICD-10-CM

## 2018-03-19 DIAGNOSIS — E03.9 HYPOTHYROIDISM, UNSPECIFIED TYPE: Primary | ICD-10-CM

## 2018-03-19 DIAGNOSIS — Z11.59 NEED FOR HEPATITIS C SCREENING TEST: ICD-10-CM

## 2018-03-19 PROCEDURE — 90471 IMMUNIZATION ADMIN: CPT | Performed by: FAMILY MEDICINE

## 2018-03-19 PROCEDURE — 90715 TDAP VACCINE 7 YRS/> IM: CPT | Performed by: FAMILY MEDICINE

## 2018-03-19 PROCEDURE — 99213 OFFICE O/P EST LOW 20 MIN: CPT | Performed by: FAMILY MEDICINE

## 2018-03-19 RX ORDER — LEVOTHYROXINE SODIUM 0.07 MG/1
75 TABLET ORAL DAILY
Qty: 30 TABLET | Refills: 3 | Status: SHIPPED | OUTPATIENT
Start: 2018-03-19 | End: 2018-07-12 | Stop reason: SDUPTHER

## 2018-03-19 RX ORDER — ROSUVASTATIN CALCIUM 5 MG/1
5 TABLET, COATED ORAL DAILY
Qty: 90 TABLET | Refills: 3 | Status: SHIPPED | OUTPATIENT
Start: 2018-03-19 | End: 2018-09-20 | Stop reason: SDUPTHER

## 2018-03-20 LAB
FOLATE SERPL-MCNC: 14.2 NG/ML
FT4I SERPL CALC-MCNC: 2.2 (ref 1.2–4.9)
HCV AB S/CO SERPL IA: <0.1 S/CO RATIO (ref 0–0.9)
T3RU NFR SERPL: 28 % (ref 24–39)
T4 SERPL-MCNC: 7.9 UG/DL (ref 4.5–12)
TSH SERPL DL<=0.005 MIU/L-ACNC: 1.68 UIU/ML (ref 0.45–4.5)
VIT B12 SERPL-MCNC: 371 PG/ML (ref 232–1245)

## 2018-03-26 ENCOUNTER — TELEPHONE (OUTPATIENT)
Dept: FAMILY MEDICINE CLINIC | Facility: CLINIC | Age: 63
End: 2018-03-26

## 2018-03-26 NOTE — TELEPHONE ENCOUNTER
Left VM to let patient know his labs are normal.     ----- Message from Katty Watkins MD sent at 3/20/2018  5:53 PM EDT -----  Please contact Arash Roman and let pt know all labs looked good.

## 2018-07-12 DIAGNOSIS — E03.9 HYPOTHYROIDISM, UNSPECIFIED TYPE: ICD-10-CM

## 2018-07-12 RX ORDER — LEVOTHYROXINE SODIUM 0.07 MG/1
75 TABLET ORAL DAILY
Qty: 30 TABLET | Refills: 0 | Status: SHIPPED | OUTPATIENT
Start: 2018-07-12 | End: 2018-08-04 | Stop reason: SDUPTHER

## 2018-08-04 DIAGNOSIS — E03.9 HYPOTHYROIDISM, UNSPECIFIED TYPE: ICD-10-CM

## 2018-08-06 RX ORDER — LEVOTHYROXINE SODIUM 0.07 MG/1
75 TABLET ORAL DAILY
Qty: 30 TABLET | Refills: 1 | Status: SHIPPED | OUTPATIENT
Start: 2018-08-06 | End: 2018-09-20 | Stop reason: SDUPTHER

## 2018-08-23 ENCOUNTER — PREP FOR SURGERY (OUTPATIENT)
Dept: OTHER | Facility: HOSPITAL | Age: 63
End: 2018-08-23

## 2018-08-23 DIAGNOSIS — K63.5 COLON POLYP: Primary | ICD-10-CM

## 2018-08-23 DIAGNOSIS — Z80.0 FAMILY HISTORY OF COLON CANCER: ICD-10-CM

## 2018-09-20 ENCOUNTER — OFFICE VISIT (OUTPATIENT)
Dept: FAMILY MEDICINE CLINIC | Facility: CLINIC | Age: 63
End: 2018-09-20

## 2018-09-20 VITALS
BODY MASS INDEX: 27.31 KG/M2 | DIASTOLIC BLOOD PRESSURE: 82 MMHG | SYSTOLIC BLOOD PRESSURE: 126 MMHG | WEIGHT: 174 LBS | OXYGEN SATURATION: 98 % | HEIGHT: 67 IN | RESPIRATION RATE: 12 BRPM | HEART RATE: 71 BPM

## 2018-09-20 DIAGNOSIS — Z13.1 SCREENING FOR DIABETES MELLITUS: ICD-10-CM

## 2018-09-20 DIAGNOSIS — Z13.0 SCREENING FOR DEFICIENCY ANEMIA: ICD-10-CM

## 2018-09-20 DIAGNOSIS — E78.5 HYPERLIPIDEMIA, UNSPECIFIED HYPERLIPIDEMIA TYPE: ICD-10-CM

## 2018-09-20 DIAGNOSIS — L98.9 SKIN LESION OF FACE: ICD-10-CM

## 2018-09-20 DIAGNOSIS — J30.2 CHRONIC SEASONAL ALLERGIC RHINITIS, UNSPECIFIED TRIGGER: ICD-10-CM

## 2018-09-20 DIAGNOSIS — E03.9 HYPOTHYROIDISM, UNSPECIFIED TYPE: Primary | ICD-10-CM

## 2018-09-20 PROCEDURE — 99214 OFFICE O/P EST MOD 30 MIN: CPT | Performed by: FAMILY MEDICINE

## 2018-09-20 RX ORDER — LEVOTHYROXINE SODIUM 0.07 MG/1
75 TABLET ORAL DAILY
Qty: 90 TABLET | Refills: 1 | Status: SHIPPED | OUTPATIENT
Start: 2018-09-20 | End: 2019-03-04 | Stop reason: DRUGHIGH

## 2018-09-20 RX ORDER — ROSUVASTATIN CALCIUM 5 MG/1
5 TABLET, COATED ORAL DAILY
Qty: 90 TABLET | Refills: 1 | Status: SHIPPED | OUTPATIENT
Start: 2018-09-20 | End: 2019-10-16 | Stop reason: SDUPTHER

## 2018-09-20 NOTE — PROGRESS NOTES
Subjective   Arash Roman is a 63 y.o. male.     Chief Complaint   Patient presents with   • Hypothyroidism     Follow Up   • Hyperlipidemia       HPI     Hypothyroidism:  -taking levothyroxine 75 mcg daily  -last TSH normal at 1.68 about 6 months ago  -no goiter or thyroid nodules  -occasional mild constipation well controlled with OTC stool softener prn    Hyperlipidemia:  -tolerating crestor 5 mg daily well  -no myalgias    Woke up this morning with nasal congestion and a tickle in his throat.  Hoarse voice.  No fever.  Some fatigue and mild cough.  He recently resumed taking an antihistamine and using an allergy nasal spray (2-3 days ago).       Review of Systems   Constitutional: Positive for fatigue. Negative for fever.   HENT: Positive for congestion, rhinorrhea and voice change. Negative for sore throat and trouble swallowing.    Eyes: Negative for pain and visual disturbance.   Respiratory: Positive for cough. Negative for chest tightness, shortness of breath and wheezing.    Cardiovascular: Negative for chest pain, palpitations and leg swelling.   Gastrointestinal: Negative for abdominal pain and nausea.   Musculoskeletal: Negative for arthralgias and myalgias.   Skin: Negative for rash and wound.        Pt has noticed a dry rough patch under his left eye which has slowly gotten bigger   Neurological: Negative for dizziness and headaches.       The following portions of the patient's history were reviewed and updated as appropriate: allergies, current medications, past family history, past medical history, past social history, past surgical history and problem list.    Past Medical History:   Diagnosis Date   • Allergic rhinitis    • Depression    • Frozen shoulder    • Hypercholesteremia    • Hypothyroidism    • Peyronie's disease    • Vitamin D deficiency      Past Surgical History:   Procedure Laterality Date   • COLONOSCOPY N/A 5/12/2016    Procedure: COLONOSCOPY to cecum/TI; polypectomy(cold bx);   Surgeon: Juno Steven MD;  Location: [facility] ENDOSCOPY;  Service:    • TONSILLECTOMY       Family History   Problem Relation Age of Onset   • Colon polyps Father    • Kidney disease Mother    • Alcohol abuse Mother    • Heart disease Maternal Uncle      Social History     Social History   • Marital status:      Spouse name: N/A   • Number of children: N/A   • Years of education: N/A     Occupational History   • Not on file.     Social History Main Topics   • Smoking status: Never Smoker   • Smokeless tobacco: Never Used   • Alcohol use Yes      Comment: socially, 1-3 x per week   • Drug use: No   • Sexual activity: Yes     Partners: Female     Other Topics Concern   • Not on file     Social History Narrative    Lives at home with his wife and a Parks Birgitiel named Fatemeh.  Works as an ,  at [employer].          No Known Allergies     Outpatient Medications Prior to Visit   Medication Sig Dispense Refill   • Azelastine-Fluticasone 137-50 MCG/ACT suspension 1 spray into each nostril daily. (Patient taking differently: 1 spray into each nostril Daily As Needed.) 23 g 11   • cetirizine (zyrTEC) 10 MG tablet Take 10 mg by mouth Daily.     • cholecalciferol (VITAMIN D3) 1000 units tablet Take 1,000 Units by mouth Daily.     • clobetasol (TEMOVATE) 0.05 % ointment Apply  topically 2 (Two) Times a Day. Do not apply to face or genitals 15 g 0   • levothyroxine (SYNTHROID, LEVOTHROID) 75 MCG tablet TAKE 1 TABLET BY MOUTH DAILY 30 tablet 1   • rosuvastatin (CRESTOR) 5 MG tablet Take 1 tablet by mouth Daily. 90 tablet 3     Objective     Vitals:    09/20/18 1618   BP: 126/82   Pulse: 71   Resp: 12   SpO2: 98%       Physical Exam   Constitutional: Vital signs are normal. He appears well-developed and well-nourished. No distress.   HENT:   Head: Normocephalic and atraumatic.   Right Ear: Tympanic membrane and ear canal normal.   Left Ear: Tympanic membrane and ear canal normal.   Nose: Mucosal  edema and rhinorrhea present.   Mouth/Throat: Uvula is midline, oropharynx is clear and moist and mucous membranes are normal. Tonsils are 0 on the right. Tonsils are 0 on the left.   Neck: No thyromegaly present.   Cardiovascular: Normal rate, regular rhythm, S1 normal, S2 normal and normal heart sounds.  Exam reveals no gallop.    No murmur heard.  Pulses:       Radial pulses are 2+ on the right side, and 2+ on the left side.   Pulmonary/Chest: Effort normal and breath sounds normal. He has no wheezes. He has no rales. He exhibits no tenderness.   Abdominal: Soft. Bowel sounds are normal. He exhibits no distension. There is no hepatosplenomegaly. There is no tenderness. There is no rebound and no guarding.   Lymphadenopathy:     He has no cervical adenopathy.   Neurological: He has normal strength. Gait normal.   Skin: Skin is warm and dry. No cyanosis. Nails show no clubbing.   Rough hyperkeratotic patch under left eye   Psychiatric: He has a normal mood and affect. His speech is normal and behavior is normal.   Nursing note and vitals reviewed.      ASSESSMENT/PLAN       Problem List Items Addressed This Visit        Cardiovascular and Mediastinum    Hyperlipidemia    Relevant Medications    Refill rosuvastatin (CRESTOR) 5 MG tablet    Other Relevant Orders    Lipid Panel With / Chol / HDL Ratio       Endocrine    Hypothyroidism - Primary    Relevant Medications    Refill levothyroxine (SYNTHROID, LEVOTHROID) 75 MCG tablet    Other Relevant Orders    TSH      Other Visit Diagnoses     Screening for diabetes mellitus        Relevant Orders    Comprehensive Metabolic Panel    Screening for deficiency anemia        Relevant Orders    CBC (No Diff)    Skin lesion of face        Relevant Orders    Ambulatory Referral to Dermatology        Seasonal allergic rhinitis  Continue zyrtec   Nasal sprays as below    Patient Instructions   Try over the counter Afrin nasal spray, 1 spray per nostril daily for the next 1-3  days, then switch to your usual azelastine-fluticasone 1 spray per nostril daily x 1 week.        Allergic Rhinitis, Adult  Allergic rhinitis is an allergic reaction that affects the mucous membrane inside the nose. It causes sneezing, a runny or stuffy nose, and the feeling of mucus going down the back of the throat (postnasal drip). Allergic rhinitis can be mild to severe.  There are two types of allergic rhinitis:  · Seasonal. This type is also called hay fever. It happens only during certain seasons.  · Perennial. This type can happen at any time of the year.    What are the causes?  This condition happens when the body's defense system (immune system) responds to certain harmless substances called allergens as though they were germs.   Seasonal allergic rhinitis is triggered by pollen, which can come from grasses, trees, and weeds. Perennial allergic rhinitis may be caused by:  · House dust mites.  · Pet dander.  · Mold spores.    What are the signs or symptoms?  Symptoms of this condition include:  · Sneezing.  · Runny or stuffy nose (nasal congestion).  · Postnasal drip.  · Itchy nose.  · Tearing of the eyes.  · Trouble sleeping.  · Daytime sleepiness.    How is this diagnosed?  This condition may be diagnosed based on:  · Your medical history.  · A physical exam.  · Tests to check for related conditions, such as:  ? Asthma.  ? Pink eye.  ? Ear infection.  ? Upper respiratory infection.  · Tests to find out which allergens trigger your symptoms. These may include skin or blood tests.    How is this treated?  There is no cure for this condition, but treatment can help control symptoms. Treatment may include:  · Taking medicines that block allergy symptoms, such as antihistamines. Medicine may be given as a shot, nasal spray, or pill.  · Avoiding the allergen.  · Desensitization. This treatment involves getting ongoing shots until your body becomes less sensitive to the allergen. This treatment may be done if  other treatments do not help.  · If taking medicine and avoiding the allergen does not work, new, stronger medicines may be prescribed.    Follow these instructions at home:  · Find out what you are allergic to. Common allergens include smoke, dust, and pollen.  · Avoid the things you are allergic to. These are some things you can do to help avoid allergens:  ? Replace carpet with wood, tile, or vinyl rosaura. Carpet can trap dander and dust.  ? Do not smoke. Do not allow smoking in your home.  ? Change your heating and air conditioning filter at least once a month.  ? During allergy season:  § Keep windows closed as much as possible.  § Plan outdoor activities when pollen counts are lowest. This is usually during the evening hours.  § When coming indoors, change clothing and shower before sitting on furniture or bedding.  · Take over-the-counter and prescription medicines only as told by your health care provider.  · Keep all follow-up visits as told by your health care provider. This is important.  Contact a health care provider if:  · You have a fever.  · You develop a persistent cough.  · You make whistling sounds when you breathe (you wheeze).  · Your symptoms interfere with your normal daily activities.  Get help right away if:  · You have shortness of breath.  Summary  · This condition can be managed by taking medicines as directed and avoiding allergens.  · Contact your health care provider if you develop a persistent cough or fever.  · During allergy season, keep windows closed as much as possible.  This information is not intended to replace advice given to you by your health care provider. Make sure you discuss any questions you have with your health care provider.  Document Released: 09/12/2002 Document Revised: 01/25/2018 Document Reviewed: 01/25/2018  Elsevier Interactive Patient Education © 2018 [employer]        Return in about 3 months (around 12/20/2018) for please schedule fasting labs within  the next few months.      Katty Watkins MD  09/20/18

## 2018-09-20 NOTE — PATIENT INSTRUCTIONS
Try over the counter Afrin nasal spray, 1 spray per nostril daily for the next 1-3 days, then switch to your usual azelastine-fluticasone 1 spray per nostril daily x 1 week.        Allergic Rhinitis, Adult  Allergic rhinitis is an allergic reaction that affects the mucous membrane inside the nose. It causes sneezing, a runny or stuffy nose, and the feeling of mucus going down the back of the throat (postnasal drip). Allergic rhinitis can be mild to severe.  There are two types of allergic rhinitis:  · Seasonal. This type is also called hay fever. It happens only during certain seasons.  · Perennial. This type can happen at any time of the year.    What are the causes?  This condition happens when the body's defense system (immune system) responds to certain harmless substances called allergens as though they were germs.   Seasonal allergic rhinitis is triggered by pollen, which can come from grasses, trees, and weeds. Perennial allergic rhinitis may be caused by:  · House dust mites.  · Pet dander.  · Mold spores.    What are the signs or symptoms?  Symptoms of this condition include:  · Sneezing.  · Runny or stuffy nose (nasal congestion).  · Postnasal drip.  · Itchy nose.  · Tearing of the eyes.  · Trouble sleeping.  · Daytime sleepiness.    How is this diagnosed?  This condition may be diagnosed based on:  · Your medical history.  · A physical exam.  · Tests to check for related conditions, such as:  ? Asthma.  ? Pink eye.  ? Ear infection.  ? Upper respiratory infection.  · Tests to find out which allergens trigger your symptoms. These may include skin or blood tests.    How is this treated?  There is no cure for this condition, but treatment can help control symptoms. Treatment may include:  · Taking medicines that block allergy symptoms, such as antihistamines. Medicine may be given as a shot, nasal spray, or pill.  · Avoiding the allergen.  · Desensitization. This treatment involves getting ongoing shots  until your body becomes less sensitive to the allergen. This treatment may be done if other treatments do not help.  · If taking medicine and avoiding the allergen does not work, new, stronger medicines may be prescribed.    Follow these instructions at home:  · Find out what you are allergic to. Common allergens include smoke, dust, and pollen.  · Avoid the things you are allergic to. These are some things you can do to help avoid allergens:  ? Replace carpet with wood, tile, or vinyl rosaura. Carpet can trap dander and dust.  ? Do not smoke. Do not allow smoking in your home.  ? Change your heating and air conditioning filter at least once a month.  ? During allergy season:  § Keep windows closed as much as possible.  § Plan outdoor activities when pollen counts are lowest. This is usually during the evening hours.  § When coming indoors, change clothing and shower before sitting on furniture or bedding.  · Take over-the-counter and prescription medicines only as told by your health care provider.  · Keep all follow-up visits as told by your health care provider. This is important.  Contact a health care provider if:  · You have a fever.  · You develop a persistent cough.  · You make whistling sounds when you breathe (you wheeze).  · Your symptoms interfere with your normal daily activities.  Get help right away if:  · You have shortness of breath.  Summary  · This condition can be managed by taking medicines as directed and avoiding allergens.  · Contact your health care provider if you develop a persistent cough or fever.  · During allergy season, keep windows closed as much as possible.  This information is not intended to replace advice given to you by your health care provider. Make sure you discuss any questions you have with your health care provider.  Document Released: 09/12/2002 Document Revised: 01/25/2018 Document Reviewed: 01/25/2018  Elsevier Interactive Patient Education © 2018 [employer] Inc.

## 2018-10-15 ENCOUNTER — ANESTHESIA EVENT (OUTPATIENT)
Dept: GASTROENTEROLOGY | Facility: HOSPITAL | Age: 63
End: 2018-10-15

## 2018-10-15 ENCOUNTER — ANESTHESIA (OUTPATIENT)
Dept: GASTROENTEROLOGY | Facility: HOSPITAL | Age: 63
End: 2018-10-15

## 2018-10-15 ENCOUNTER — HOSPITAL ENCOUNTER (OUTPATIENT)
Facility: HOSPITAL | Age: 63
Setting detail: HOSPITAL OUTPATIENT SURGERY
Discharge: HOME | End: 2018-10-15
Attending: INTERNAL MEDICINE | Admitting: INTERNAL MEDICINE

## 2018-10-15 VITALS
HEART RATE: 62 BPM | TEMPERATURE: 98.1 F | DIASTOLIC BLOOD PRESSURE: 72 MMHG | WEIGHT: 169.06 LBS | SYSTOLIC BLOOD PRESSURE: 117 MMHG | OXYGEN SATURATION: 99 % | RESPIRATION RATE: 16 BRPM | BODY MASS INDEX: 26.47 KG/M2

## 2018-10-15 DIAGNOSIS — K63.5 COLON POLYP: ICD-10-CM

## 2018-10-15 DIAGNOSIS — Z80.0 FAMILY HISTORY OF COLON CANCER: ICD-10-CM

## 2018-10-15 DIAGNOSIS — K63.5 COLON POLYPS: ICD-10-CM

## 2018-10-15 DIAGNOSIS — Z80.0 FH: COLON CANCER: ICD-10-CM

## 2018-10-15 PROCEDURE — 25010000002 PROPOFOL 1000 MG/ML EMULSION: Performed by: ANESTHESIOLOGY

## 2018-10-15 PROCEDURE — 88305 TISSUE EXAM BY PATHOLOGIST: CPT | Performed by: INTERNAL MEDICINE

## 2018-10-15 PROCEDURE — 45380 COLONOSCOPY AND BIOPSY: CPT | Performed by: INTERNAL MEDICINE

## 2018-10-15 PROCEDURE — 25010000002 PROPOFOL 10 MG/ML EMULSION: Performed by: ANESTHESIOLOGY

## 2018-10-15 RX ORDER — DIPHENHYDRAMINE HCL 25 MG
25 CAPSULE ORAL EVERY 6 HOURS PRN
Status: DISCONTINUED | OUTPATIENT
Start: 2018-10-15 | End: 2018-10-15 | Stop reason: HOSPADM

## 2018-10-15 RX ORDER — SODIUM CHLORIDE, SODIUM LACTATE, POTASSIUM CHLORIDE, CALCIUM CHLORIDE 600; 310; 30; 20 MG/100ML; MG/100ML; MG/100ML; MG/100ML
1000 INJECTION, SOLUTION INTRAVENOUS CONTINUOUS
Status: DISCONTINUED | OUTPATIENT
Start: 2018-10-15 | End: 2018-10-15 | Stop reason: HOSPADM

## 2018-10-15 RX ORDER — FENTANYL CITRATE 50 UG/ML
25 INJECTION, SOLUTION INTRAMUSCULAR; INTRAVENOUS
Status: DISCONTINUED | OUTPATIENT
Start: 2018-10-15 | End: 2018-10-15 | Stop reason: HOSPADM

## 2018-10-15 RX ORDER — ONDANSETRON 2 MG/ML
4 INJECTION INTRAMUSCULAR; INTRAVENOUS ONCE AS NEEDED
Status: DISCONTINUED | OUTPATIENT
Start: 2018-10-15 | End: 2018-10-15 | Stop reason: HOSPADM

## 2018-10-15 RX ORDER — LIDOCAINE HYDROCHLORIDE 20 MG/ML
INJECTION, SOLUTION INFILTRATION; PERINEURAL AS NEEDED
Status: DISCONTINUED | OUTPATIENT
Start: 2018-10-15 | End: 2018-10-15 | Stop reason: SURG

## 2018-10-15 RX ORDER — EPHEDRINE SULFATE 50 MG/ML
5 INJECTION, SOLUTION INTRAVENOUS ONCE AS NEEDED
Status: DISCONTINUED | OUTPATIENT
Start: 2018-10-15 | End: 2018-10-15 | Stop reason: HOSPADM

## 2018-10-15 RX ORDER — NALOXONE HCL 0.4 MG/ML
0.4 VIAL (ML) INJECTION AS NEEDED
Status: DISCONTINUED | OUTPATIENT
Start: 2018-10-15 | End: 2018-10-15 | Stop reason: HOSPADM

## 2018-10-15 RX ORDER — LABETALOL HYDROCHLORIDE 5 MG/ML
5 INJECTION, SOLUTION INTRAVENOUS
Status: DISCONTINUED | OUTPATIENT
Start: 2018-10-15 | End: 2018-10-15 | Stop reason: HOSPADM

## 2018-10-15 RX ORDER — PROPOFOL 10 MG/ML
VIAL (ML) INTRAVENOUS AS NEEDED
Status: DISCONTINUED | OUTPATIENT
Start: 2018-10-15 | End: 2018-10-15 | Stop reason: SURG

## 2018-10-15 RX ORDER — LIDOCAINE HYDROCHLORIDE 10 MG/ML
0.5 INJECTION, SOLUTION INFILTRATION; PERINEURAL ONCE AS NEEDED
Status: DISCONTINUED | OUTPATIENT
Start: 2018-10-15 | End: 2018-10-15 | Stop reason: HOSPADM

## 2018-10-15 RX ORDER — SODIUM CHLORIDE 0.9 % (FLUSH) 0.9 %
3 SYRINGE (ML) INJECTION AS NEEDED
Status: DISCONTINUED | OUTPATIENT
Start: 2018-10-15 | End: 2018-10-15 | Stop reason: HOSPADM

## 2018-10-15 RX ORDER — SODIUM CHLORIDE 0.9 % (FLUSH) 0.9 %
1-10 SYRINGE (ML) INJECTION AS NEEDED
Status: DISCONTINUED | OUTPATIENT
Start: 2018-10-15 | End: 2018-10-15 | Stop reason: HOSPADM

## 2018-10-15 RX ADMIN — PROPOFOL 160 MCG/KG/MIN: 10 INJECTION, EMULSION INTRAVENOUS at 14:42

## 2018-10-15 RX ADMIN — SODIUM CHLORIDE, POTASSIUM CHLORIDE, SODIUM LACTATE AND CALCIUM CHLORIDE 1000 ML: 600; 310; 30; 20 INJECTION, SOLUTION INTRAVENOUS at 13:28

## 2018-10-15 RX ADMIN — LIDOCAINE HYDROCHLORIDE 60 MG: 20 INJECTION, SOLUTION INFILTRATION; PERINEURAL at 14:42

## 2018-10-15 RX ADMIN — PROPOFOL 120 MG: 10 INJECTION, EMULSION INTRAVENOUS at 14:42

## 2018-10-15 NOTE — H&P
[facility]  Pre Procedure History & Physical    Chief Complaint:   62 yo male whose dad had colon cancer and who has a personal h/o colon polyps. She last had a colonoscopy in 5/16.    Subjective     HPI:   63 y.o. male whose dad had colon cancer and who has a personal h/o colon polyps. She last had a colonoscopy in 5/16.    Past Medical History:   Past Medical History:   Diagnosis Date   • Allergic rhinitis    • Depression    • Frozen shoulder    • Hypercholesteremia    • Hypothyroidism    • Peyronie's disease    • Vitamin D deficiency        Family History:  Family History   Problem Relation Age of Onset   • Colon polyps Father    • Kidney disease Mother    • Alcohol abuse Mother    • Heart disease Maternal Uncle        Social History:   reports that he has never smoked. He has never used smokeless tobacco. He reports that he drinks alcohol. He reports that he does not use drugs.    Medications:   Prescriptions Prior to Admission   Medication Sig Dispense Refill Last Dose   • Azelastine-Fluticasone 137-50 MCG/ACT suspension 1 spray into each nostril daily. (Patient taking differently: 1 spray into each nostril Daily As Needed.) 23 g 11 Past Week at Unknown time   • cetirizine (zyrTEC) 10 MG tablet Take 10 mg by mouth Daily.   Past Week at Unknown time   • cholecalciferol (VITAMIN D3) 1000 units tablet Take 1,000 Units by mouth Daily.   10/14/2018 at Unknown time   • levothyroxine (SYNTHROID, LEVOTHROID) 75 MCG tablet Take 1 tablet by mouth Daily. 90 tablet 1 10/15/2018 at Unknown time   • rosuvastatin (CRESTOR) 5 MG tablet Take 1 tablet by mouth Daily. 90 tablet 1 10/15/2018 at Unknown time   • clobetasol (TEMOVATE) 0.05 % ointment Apply  topically 2 (Two) Times a Day. Do not apply to face or genitals 15 g 0 More than a month at Unknown time       Allergies:  Patient has no known allergies.    ROS:    Pertinent items are noted in HPI     Objective     Blood pressure 122/72, pulse 75,  temperature 98.5 °F (36.9 °C), temperature source Oral, resp. rate 16, weight 76.7 kg (169 lb 1 oz), SpO2 96 %.    Physical Exam   Constitutional: Pt is oriented to person, place, and time and well-developed, well-nourished, and in no distress.   HENT:   Mouth/Throat: Oropharynx is clear and moist.   Neck: Normal range of motion. Neck supple.   Cardiovascular: Normal rate, regular rhythm and normal heart sounds.    Pulmonary/Chest: Effort normal and breath sounds normal. No respiratory distress. No  wheezes.   Abdominal: Soft. Bowel sounds are normal.   Skin: Skin is warm and dry.   Psychiatric: Mood, memory, affect and judgment normal.     Assessment/Plan     Diagnosis:  63 y.o. male whose dad had colon cancer and who has a personal h/o colon polyps. She last had a colonoscopy in 5/16.    Anticipated Surgical Procedure:  Colonoscopy    The risks, benefits, and alternatives of this procedure have been discussed with the patient or the responsible party- the patient understands and agrees to proceed.

## 2018-10-15 NOTE — ANESTHESIA PREPROCEDURE EVALUATION
Anesthesia Evaluation     no history of anesthetic complications:               Airway   Mallampati: I  Neck ROM: full  no difficulty expected  Dental - normal exam     Pulmonary - normal exam   (+) a smoker Former,   (-) COPD, asthma, sleep apnea    PE comment: nonlabored  Cardiovascular - normal exam    Rhythm: regular  Rate: normal    (+) hyperlipidemia,   (-) hypertension, valvular problems/murmurs, past MI, CAD, dysrhythmias, angina      Neuro/Psych  (+) psychiatric history Depression,     (-) seizures, TIA, CVA  GI/Hepatic/Renal/Endo    (+)   hypothyroidism,   (-) GERD, liver disease, diabetes, hyperthyroidism    ROS Comment: H/o Colon Polyp    Musculoskeletal     (+) arthralgias,   Abdominal    Substance History      OB/GYN          Other   (+) arthritis       Other Comment: Peyronie's disease;  Vit D Def.                Anesthesia Plan    ASA 2     MAC     Anesthetic plan, all risks, benefits, and alternatives have been provided, discussed and informed consent has been obtained with: patient.

## 2018-10-15 NOTE — ANESTHESIA POSTPROCEDURE EVALUATION
Patient: Arash Roman    Procedure Summary     Date:  10/15/18 Room / Location:  [facility] ENDOSCOPY 8 /  [facility]    Anesthesia Start:  1438 Anesthesia Stop:  1522    Procedure:  COLONOSCOPY TO CECUM/TI WITH POLYPECTOMY (COLD BX) (N/A ) Diagnosis:       Colon polyp      Family history of colon cancer      (Colon polyp [K63.5])      (Family history of colon cancer [Z80.0])    Surgeon:  Juno Steven MD Provider:  Danny Crenshaw MD    Anesthesia Type:  MAC ASA Status:  2          Anesthesia Type: MAC  Last vitals  BP   93/67 (10/15/18 1520)   Temp   36.9 °C (98.5 °F) (10/15/18 1321)   Pulse   70 (10/15/18 1520)   Resp   16 (10/15/18 1520)     SpO2   99 % (10/15/18 1520)     Post Anesthesia Care and Evaluation    Patient location during evaluation: bedside  Patient participation: complete - patient participated  Level of consciousness: sleepy but conscious  Pain management: adequate  Airway patency: patent  Anesthetic complications: No anesthetic complications    Cardiovascular status: acceptable  Respiratory status: acceptable  Hydration status: acceptable    Comments: *BP 93/67 (BP Location: Left arm, Patient Position: Lying)   Pulse 70   Temp 36.9 °C (98.5 °F) (Oral)   Resp 16   Wt 76.7 kg (169 lb 1 oz)   SpO2 99%   BMI 26.47 kg/m²

## 2018-10-16 ENCOUNTER — TELEPHONE (OUTPATIENT)
Dept: FAMILY MEDICINE CLINIC | Facility: CLINIC | Age: 63
End: 2018-10-16

## 2018-10-16 DIAGNOSIS — L98.9 ENCOUNTER FOR REMOVAL OF SKIN LESION: Primary | ICD-10-CM

## 2018-10-16 NOTE — TELEPHONE ENCOUNTER
----- Message from Elmo Castro sent at 10/16/2018  1:37 PM EDT -----  Regarding: Referral  Contact: [phone]  Pt called and is requesting another referral to the dermatologist. He saw Dr Shafer the first time and said that he had a biopsy done above his eye. It came back that the spot by his eye needs to be removed and he is not comfortable w/that office doing that. He prefers [facility] if they are not scheduling out too far and if not he will try [facility].

## 2018-10-17 LAB
CYTO UR: NORMAL
LAB AP CASE REPORT: NORMAL
PATH REPORT.FINAL DX SPEC: NORMAL
PATH REPORT.GROSS SPEC: NORMAL

## 2018-10-24 ENCOUNTER — TELEPHONE (OUTPATIENT)
Dept: GASTROENTEROLOGY | Facility: CLINIC | Age: 63
End: 2018-10-24

## 2018-10-24 NOTE — TELEPHONE ENCOUNTER
----- Message from Ximena Alvarez sent at 10/24/2018 10:05 AM EDT -----  Regarding: PATH RESULTS  Contact: [phone]  PT WIFE CALLED FOR RESULTS. THEY WILL BE GOING OUT Thursday. WOULD LIKE TO GET RESULTS BEFORE THAN IF POSSIBLE.

## 2018-10-25 ENCOUNTER — TELEPHONE (OUTPATIENT)
Dept: GASTROENTEROLOGY | Facility: CLINIC | Age: 63
End: 2018-10-25

## 2018-10-25 NOTE — TELEPHONE ENCOUNTER
----- Message from Juno Steven MD sent at 10/24/2018  5:47 PM EDT -----  Tell him that the colon polyps that were removed were not cancerous but were precancerous.  I would recommend a repeat colonoscopy in 2 years because of his personal history colon polyps, family history of colon cancer, and the prep this year was only fair. Thx. kjh

## 2018-10-25 NOTE — TELEPHONE ENCOUNTER
Called pt and advised per Dr Steven that the colon polyps that were removed were not cancerous but were precancerous.  He recommends a repeat c/s in 2 yrs due to personal hx colon polyps, family hx of colon cancer and the prep this year was only fair. Pt verb understanding.     C/s placed in recall for 10/15/2020.

## 2019-02-26 LAB
ALBUMIN SERPL-MCNC: 4.4 G/DL (ref 3.5–5.2)
ALBUMIN/GLOB SERPL: 2.3 G/DL
ALP SERPL-CCNC: 65 U/L (ref 39–117)
ALT SERPL-CCNC: 28 U/L (ref 1–41)
AST SERPL-CCNC: 20 U/L (ref 1–40)
BILIRUB SERPL-MCNC: 0.3 MG/DL (ref 0.1–1.2)
BUN SERPL-MCNC: 17 MG/DL (ref 8–23)
BUN/CREAT SERPL: 15.2 (ref 7–25)
CALCIUM SERPL-MCNC: 9.3 MG/DL (ref 8.6–10.5)
CHLORIDE SERPL-SCNC: 105 MMOL/L (ref 98–107)
CHOLEST SERPL-MCNC: 156 MG/DL (ref 0–200)
CHOLEST/HDLC SERPL: 3.63 {RATIO}
CO2 SERPL-SCNC: 25.5 MMOL/L (ref 22–29)
CREAT SERPL-MCNC: 1.12 MG/DL (ref 0.76–1.27)
ERYTHROCYTE [DISTWIDTH] IN BLOOD BY AUTOMATED COUNT: 12.6 % (ref 12.3–15.4)
GLOBULIN SER CALC-MCNC: 1.9 GM/DL
GLUCOSE SERPL-MCNC: 111 MG/DL (ref 65–99)
HCT VFR BLD AUTO: 45.1 % (ref 37.5–51)
HDLC SERPL-MCNC: 43 MG/DL (ref 40–60)
HGB BLD-MCNC: 14.4 G/DL (ref 13–17.7)
LDLC SERPL CALC-MCNC: 91 MG/DL (ref 0–100)
MCH RBC QN AUTO: 33 PG (ref 26.6–33)
MCHC RBC AUTO-ENTMCNC: 31.9 G/DL (ref 31.5–35.7)
MCV RBC AUTO: 103.2 FL (ref 79–97)
PLATELET # BLD AUTO: 174 10*3/MM3 (ref 140–450)
POTASSIUM SERPL-SCNC: 4.3 MMOL/L (ref 3.5–5.2)
PROT SERPL-MCNC: 6.3 G/DL (ref 6–8.5)
RBC # BLD AUTO: 4.37 10*6/MM3 (ref 4.14–5.8)
SODIUM SERPL-SCNC: 142 MMOL/L (ref 136–145)
TRIGL SERPL-MCNC: 112 MG/DL (ref 0–150)
TSH SERPL DL<=0.005 MIU/L-ACNC: 4.3 MIU/ML (ref 0.27–4.2)
VLDLC SERPL CALC-MCNC: 22.4 MG/DL (ref 5–40)
WBC # BLD AUTO: 4.76 10*3/MM3 (ref 3.4–10.8)

## 2019-03-04 ENCOUNTER — OFFICE VISIT (OUTPATIENT)
Dept: FAMILY MEDICINE CLINIC | Facility: CLINIC | Age: 64
End: 2019-03-04

## 2019-03-04 VITALS
DIASTOLIC BLOOD PRESSURE: 84 MMHG | HEIGHT: 68 IN | SYSTOLIC BLOOD PRESSURE: 124 MMHG | OXYGEN SATURATION: 98 % | WEIGHT: 179 LBS | RESPIRATION RATE: 12 BRPM | HEART RATE: 69 BPM | BODY MASS INDEX: 27.13 KG/M2

## 2019-03-04 DIAGNOSIS — Z23 NEED FOR VACCINATION: ICD-10-CM

## 2019-03-04 DIAGNOSIS — R73.09 ELEVATED GLUCOSE LEVEL: ICD-10-CM

## 2019-03-04 DIAGNOSIS — E03.9 HYPOTHYROIDISM, UNSPECIFIED TYPE: ICD-10-CM

## 2019-03-04 DIAGNOSIS — Z00.00 ANNUAL PHYSICAL EXAM: Primary | ICD-10-CM

## 2019-03-04 LAB — HBA1C MFR BLD: 4.5 %

## 2019-03-04 PROCEDURE — 90750 HZV VACC RECOMBINANT IM: CPT | Performed by: FAMILY MEDICINE

## 2019-03-04 PROCEDURE — 83036 HEMOGLOBIN GLYCOSYLATED A1C: CPT | Performed by: FAMILY MEDICINE

## 2019-03-04 PROCEDURE — 90471 IMMUNIZATION ADMIN: CPT | Performed by: FAMILY MEDICINE

## 2019-03-04 PROCEDURE — 99396 PREV VISIT EST AGE 40-64: CPT | Performed by: FAMILY MEDICINE

## 2019-03-04 RX ORDER — LEVOTHYROXINE SODIUM 88 UG/1
88 TABLET ORAL DAILY
Qty: 30 TABLET | Refills: 3 | Status: SHIPPED | OUTPATIENT
Start: 2019-03-04 | End: 2019-06-25 | Stop reason: SDUPTHER

## 2019-03-04 NOTE — PROGRESS NOTES
Subjective   Arash Roman is a 63 y.o. male. Patient is here today for   Chief Complaint   Patient presents with   • Annual Exam     Physical           HPI      Health Habits:  Dental Exam. up to date  Eye Exam. up to date  Exercise: 6 times/week.  Current exercise activities include: calisthenics   Followed by Dermatology, Teri Lopez, due to hx of basal cell carcinoma on cheek  No mental health concerns  Seatbelt use: yes  Sunscreen use: yes  Smoke detectors: yes  Helmet use: yes  Firearms: yes and kept in a locked safe   Declines STI screening  Colonoscopy UTD through Oct 2020  Tdap is UTD  He agrees to get a first dose of Shingrix today  Fasting labs completed last week and reviewed as below    Cholesterol well controlled; taking crestor 5 mg per day  Macrocytosis without anemia  fasting blood sugar 111 but remainder of CMP normal    Hypothyroidism:  -taking levothyroxine 75 mcg/day  -TSH slightly elevated at 4.3 about 1 week ago  -some fatigue and constipation  -no cold intolerance or hair loss      Past Medical History:   Diagnosis Date   • Allergic rhinitis    • Basal cell carcinoma     followed by Dermatology, Dr. Lopez   • Colon polyp    • Depression    • Frozen shoulder    • Hypercholesteremia    • Hypothyroidism    • Peyronie's disease    • Vitamin D deficiency         Past Surgical History:   Procedure Laterality Date   • COLONOSCOPY N/A 5/12/2016    Procedure: COLONOSCOPY to cecum/TI; polypectomy(cold bx);  Surgeon: Juno Steven MD;  Location: [facility] ENDOSCOPY;  Service:    • COLONOSCOPY N/A 10/15/2018    Procedure: COLONOSCOPY TO CECUM/TI WITH POLYPECTOMY (COLD BX);  Surgeon: Juno Steven MD;  Location: [facility] ENDOSCOPY;  Service: Gastroenterology   • TONSILLECTOMY         Family History   Problem Relation Age of Onset   • Colon polyps Father    • Kidney disease Mother    • Alcohol abuse Mother    • Other Mother         prostate disease   • Heart disease Maternal Uncle        No Known Allergies      Social History     Tobacco Use   • Smoking status: Never Smoker   • Smokeless tobacco: Never Used   Substance Use Topics   • Alcohol use: Yes     Comment: socially, 1-3 x per week   • Drug use: No     Social History     Social History Narrative    Lives at home with his wife and a Parks Birgitiel named Fatemeh.  Works as an ,  at [employer].         Outpatient Medications Prior to Visit   Medication Sig Dispense Refill   • Azelastine-Fluticasone 137-50 MCG/ACT suspension 1 spray into each nostril daily. (Patient taking differently: 1 spray into each nostril Daily As Needed.) 23 g 11   • cetirizine (zyrTEC) 10 MG tablet Take 10 mg by mouth Daily.     • cholecalciferol (VITAMIN D3) 1000 units tablet Take 1,000 Units by mouth Daily.     • clobetasol (TEMOVATE) 0.05 % ointment Apply  topically 2 (Two) Times a Day. Do not apply to face or genitals 15 g 0   • rosuvastatin (CRESTOR) 5 MG tablet Take 1 tablet by mouth Daily. 90 tablet 1   • levothyroxine (SYNTHROID, LEVOTHROID) 75 MCG tablet Take 1 tablet by mouth Daily. 90 tablet 1       Review of Systems   Constitutional: Negative for appetite change, fatigue, fever and unexpected weight change.   HENT: Negative for congestion, rhinorrhea and sore throat.    Eyes: Negative for pain and visual disturbance.   Respiratory: Negative for cough, chest tightness and shortness of breath.    Cardiovascular: Negative for chest pain and palpitations.   Gastrointestinal: Negative for abdominal pain, constipation, diarrhea, nausea and vomiting.   Genitourinary: Negative for dysuria and hematuria.   Musculoskeletal: Negative for arthralgias and myalgias.   Skin: Negative for pallor and rash.   Neurological: Negative for dizziness and headaches.   Psychiatric/Behavioral: Negative for dysphoric mood. The patient is not nervous/anxious.          Objective       Vitals:    03/04/19 1416   BP: 124/84   Pulse: 69   Resp: 12   SpO2: 98%     Physical Exam    Constitutional: He appears well-developed and well-nourished. No distress.   HENT:   Head: Normocephalic and atraumatic.   Right Ear: Tympanic membrane and ear canal normal.   Left Ear: Tympanic membrane and ear canal normal.   Nose: Nose normal.   Mouth/Throat: Oropharynx is clear and moist.   Eyes: Conjunctivae are normal. Pupils are equal, round, and reactive to light.   Neck: Normal range of motion. Neck supple. No thyromegaly present.   Cardiovascular: Normal rate, regular rhythm, S1 normal, S2 normal and normal heart sounds. Exam reveals no gallop.   No murmur heard.  Pulses:       Radial pulses are 2+ on the right side, and 2+ on the left side.   Pulmonary/Chest: Effort normal and breath sounds normal. He has no wheezes. He has no rales. He exhibits no tenderness.   Abdominal: Soft. Bowel sounds are normal. He exhibits no distension. There is no hepatosplenomegaly. There is no tenderness. There is no rebound and no guarding.   Musculoskeletal: He exhibits no edema.   Lymphadenopathy:     He has no cervical adenopathy.   Neurological: He is alert. He has normal strength. Gait normal.   Skin: Skin is warm and dry. No cyanosis. Nails show no clubbing.   Psychiatric: He has a normal mood and affect. His speech is normal and behavior is normal.   Nursing note and vitals reviewed.      ASSESSMENT/PLAN       Problem List Items Addressed This Visit        Endocrine    Hypothyroidism    Relevant Medications    Increase levothyroxine (SYNTHROID) from 75 to 88 MCG tablet daily  Repeat TSH in 3 months      Other Visit Diagnoses     Annual physical exam    -  Primary    Relevant Orders    POC Glycosylated Hemoglobin (Hb A1C) normal    Shingrix Vaccine  Pt requests screening PSA level with next set of routine blood tests      Elevated glucose level        Relevant Orders    POC Glycosylated Hemoglobin (Hb A1C) normal today    Macrocytosis w/o anemia  Plan to obtain repeat CBC as well as vitamin B12 and folate levels in 3  months    Need for vaccination        Relevant Orders    Shingrix Vaccine            Patient Instructions   Hypothyroidism  Hypothyroidism is a disorder of the thyroid. The thyroid is a large gland that is located in the lower front of the neck. The thyroid releases hormones that control how the body works. With hypothyroidism, the thyroid does not make enough of these hormones.  What are the causes?  Causes of hypothyroidism may include:  · Viral infections.  · Pregnancy.  · Your own defense system (immune system) attacking your thyroid.  · Certain medicines.  · Birth defects.  · Past radiation treatments to your head or neck.  · Past treatment with radioactive iodine.  · Past surgical removal of part or all of your thyroid.  · Problems with the gland that is located in the center of your brain (pituitary).    What are the signs or symptoms?  Signs and symptoms of hypothyroidism may include:  · Feeling as though you have no energy (lethargy).  · Inability to tolerate cold.  · Weight gain that is not explained by a change in diet or exercise habits.  · Dry skin.  · Coarse hair.  · Menstrual irregularity.  · Slowing of thought processes.  · Constipation.  · Sadness or depression.    How is this diagnosed?  Your health care provider may diagnose hypothyroidism with blood tests and ultrasound tests.  How is this treated?  Hypothyroidism is treated with medicine that replaces the hormones that your body does not make. After you begin treatment, it may take several weeks for symptoms to go away.  Follow these instructions at home:  · Take medicines only as directed by your health care provider.  · If you start taking any new medicines, tell your health care provider.  · Keep all follow-up visits as directed by your health care provider. This is important. As your condition improves, your dosage needs may change. You will need to have blood tests regularly so that your health care provider can watch your  condition.  Contact a health care provider if:  · Your symptoms do not get better with treatment.  · You are taking thyroid replacement medicine and:  ? You sweat excessively.  ? You have tremors.  ? You feel anxious.  ? You lose weight rapidly.  ? You cannot tolerate heat.  ? You have emotional swings.  ? You have diarrhea.  ? You feel weak.  Get help right away if:  · You develop chest pain.  · You develop an irregular heartbeat.  · You develop a rapid heartbeat.  This information is not intended to replace advice given to you by your health care provider. Make sure you discuss any questions you have with your health care provider.  Document Released: 12/18/2006 Document Revised: 05/25/2017 Document Reviewed: 05/05/2015  [employer] Interactive Patient Education © 2018 [employer]        Return in about 3 months (around 6/4/2019) for recheck thyroid and get 2nd dose of Shingrix  with labs 1 week prior (TSH, CBC, B12/folate, PSA).

## 2019-03-04 NOTE — PATIENT INSTRUCTIONS
Hypothyroidism  Hypothyroidism is a disorder of the thyroid. The thyroid is a large gland that is located in the lower front of the neck. The thyroid releases hormones that control how the body works. With hypothyroidism, the thyroid does not make enough of these hormones.  What are the causes?  Causes of hypothyroidism may include:  · Viral infections.  · Pregnancy.  · Your own defense system (immune system) attacking your thyroid.  · Certain medicines.  · Birth defects.  · Past radiation treatments to your head or neck.  · Past treatment with radioactive iodine.  · Past surgical removal of part or all of your thyroid.  · Problems with the gland that is located in the center of your brain (pituitary).    What are the signs or symptoms?  Signs and symptoms of hypothyroidism may include:  · Feeling as though you have no energy (lethargy).  · Inability to tolerate cold.  · Weight gain that is not explained by a change in diet or exercise habits.  · Dry skin.  · Coarse hair.  · Menstrual irregularity.  · Slowing of thought processes.  · Constipation.  · Sadness or depression.    How is this diagnosed?  Your health care provider may diagnose hypothyroidism with blood tests and ultrasound tests.  How is this treated?  Hypothyroidism is treated with medicine that replaces the hormones that your body does not make. After you begin treatment, it may take several weeks for symptoms to go away.  Follow these instructions at home:  · Take medicines only as directed by your health care provider.  · If you start taking any new medicines, tell your health care provider.  · Keep all follow-up visits as directed by your health care provider. This is important. As your condition improves, your dosage needs may change. You will need to have blood tests regularly so that your health care provider can watch your condition.  Contact a health care provider if:  · Your symptoms do not get better with treatment.  · You are taking thyroid  replacement medicine and:  ? You sweat excessively.  ? You have tremors.  ? You feel anxious.  ? You lose weight rapidly.  ? You cannot tolerate heat.  ? You have emotional swings.  ? You have diarrhea.  ? You feel weak.  Get help right away if:  · You develop chest pain.  · You develop an irregular heartbeat.  · You develop a rapid heartbeat.  This information is not intended to replace advice given to you by your health care provider. Make sure you discuss any questions you have with your health care provider.  Document Released: 12/18/2006 Document Revised: 05/25/2017 Document Reviewed: 05/05/2015  [employer] Interactive Patient Education © 2018 [employer]

## 2019-03-21 DIAGNOSIS — E03.9 HYPOTHYROIDISM, UNSPECIFIED TYPE: ICD-10-CM

## 2019-03-21 RX ORDER — LEVOTHYROXINE SODIUM 0.07 MG/1
75 TABLET ORAL DAILY
Qty: 90 TABLET | Refills: 0 | OUTPATIENT
Start: 2019-03-21

## 2019-04-19 ENCOUNTER — HOSPITAL ENCOUNTER (EMERGENCY)
Facility: HOSPITAL | Age: 64
Discharge: HOME | End: 2019-04-19
Attending: EMERGENCY MEDICINE | Admitting: EMERGENCY MEDICINE

## 2019-04-19 ENCOUNTER — APPOINTMENT (OUTPATIENT)
Dept: GENERAL RADIOLOGY | Facility: HOSPITAL | Age: 64
End: 2019-04-19

## 2019-04-19 VITALS
RESPIRATION RATE: 16 BRPM | HEIGHT: 68 IN | TEMPERATURE: 98.4 F | BODY MASS INDEX: 27.81 KG/M2 | OXYGEN SATURATION: 95 % | HEART RATE: 79 BPM | DIASTOLIC BLOOD PRESSURE: 88 MMHG | SYSTOLIC BLOOD PRESSURE: 147 MMHG | WEIGHT: 183.5 LBS

## 2019-04-19 DIAGNOSIS — M62.830 BACK SPASM: ICD-10-CM

## 2019-04-19 DIAGNOSIS — S20.211A CHEST WALL CONTUSION, RIGHT, INITIAL ENCOUNTER: Primary | ICD-10-CM

## 2019-04-19 PROCEDURE — 99283 EMERGENCY DEPT VISIT LOW MDM: CPT

## 2019-04-19 PROCEDURE — 71101 X-RAY EXAM UNILAT RIBS/CHEST: CPT

## 2019-04-19 RX ORDER — DICLOFENAC SODIUM 75 MG/1
75 TABLET, DELAYED RELEASE ORAL 2 TIMES DAILY
Qty: 20 TABLET | Refills: 0 | Status: SHIPPED | OUTPATIENT
Start: 2019-04-19 | End: 2019-07-01

## 2019-04-19 RX ORDER — ORPHENADRINE CITRATE 100 MG/1
100 TABLET, EXTENDED RELEASE ORAL 2 TIMES DAILY
Qty: 20 TABLET | Refills: 0 | Status: SHIPPED | OUTPATIENT
Start: 2019-04-19 | End: 2019-07-01

## 2019-04-19 RX ORDER — HYDROCODONE BITARTRATE AND ACETAMINOPHEN 5; 325 MG/1; MG/1
1-2 TABLET ORAL EVERY 6 HOURS PRN
Qty: 12 TABLET | Refills: 0 | Status: SHIPPED | OUTPATIENT
Start: 2019-04-19 | End: 2019-07-01

## 2019-06-25 DIAGNOSIS — E03.9 HYPOTHYROIDISM, UNSPECIFIED TYPE: ICD-10-CM

## 2019-06-25 RX ORDER — LEVOTHYROXINE SODIUM 88 UG/1
TABLET ORAL
Qty: 90 TABLET | Refills: 1 | Status: SHIPPED | OUTPATIENT
Start: 2019-06-25 | End: 2019-12-23

## 2019-07-01 ENCOUNTER — OFFICE VISIT (OUTPATIENT)
Dept: INTERNAL MEDICINE | Facility: CLINIC | Age: 64
End: 2019-07-01

## 2019-07-01 VITALS
HEART RATE: 72 BPM | OXYGEN SATURATION: 96 % | HEIGHT: 68 IN | SYSTOLIC BLOOD PRESSURE: 116 MMHG | BODY MASS INDEX: 27.13 KG/M2 | DIASTOLIC BLOOD PRESSURE: 78 MMHG | WEIGHT: 179 LBS

## 2019-07-01 DIAGNOSIS — Z12.5 PROSTATE CANCER SCREENING: ICD-10-CM

## 2019-07-01 DIAGNOSIS — E03.9 ACQUIRED HYPOTHYROIDISM: Primary | ICD-10-CM

## 2019-07-01 DIAGNOSIS — E55.9 VITAMIN D DEFICIENCY: ICD-10-CM

## 2019-07-01 DIAGNOSIS — E78.5 HYPERLIPIDEMIA, UNSPECIFIED HYPERLIPIDEMIA TYPE: ICD-10-CM

## 2019-07-01 PROCEDURE — 99214 OFFICE O/P EST MOD 30 MIN: CPT | Performed by: NURSE PRACTITIONER

## 2019-07-01 NOTE — PROGRESS NOTES
Subjective   Arash Roman is a 63 y.o. male.     He is here to establish care. He was previous patient of Katty Watkins. He has history of hypothyroidism and Crestor, which are well managed. He is very active.      Hypothyroidism   This is a chronic problem. The current episode started more than 1 year ago. The problem has been unchanged. Associated symptoms include fatigue and headaches (chronic ). Pertinent negatives include no abdominal pain, anorexia, arthralgias, change in bowel habit, chest pain, chills, coughing, fever, myalgias, nausea, numbness or vertigo. Treatments tried: levothyroxine  The treatment provided significant relief.   Hyperlipidemia   This is a chronic problem. The current episode started more than 1 year ago. The problem is controlled. Recent lipid tests were reviewed and are normal. Exacerbating diseases include hypothyroidism. Pertinent negatives include no chest pain, focal weakness, leg pain, myalgias or shortness of breath. Current antihyperlipidemic treatment includes statins. The current treatment provides significant improvement of lipids.        The following portions of the patient's history were reviewed and updated as appropriate: allergies, current medications, past family history, past medical history, past social history, past surgical history and problem list.    Review of Systems   Constitutional: Positive for fatigue. Negative for activity change, appetite change, chills and fever.        Overall doing well    HENT: Positive for tinnitus (chronic, no changes).    Eyes: Negative for visual disturbance.   Respiratory: Negative for apnea, cough, shortness of breath and wheezing.         Snoring mild    Cardiovascular: Negative for chest pain, palpitations and leg swelling.   Gastrointestinal: Positive for blood in stool (r/t hemorrhoids ) and constipation (chronic ). Negative for abdominal pain, anorexia, change in bowel habit and nausea.   Genitourinary:        ED r/t  peyronies disease, chronic    Musculoskeletal: Positive for back pain (chronic ). Negative for arthralgias and myalgias.   Allergic/Immunologic: Positive for environmental allergies.   Neurological: Positive for headaches (chronic ). Negative for dizziness, vertigo, focal weakness, light-headedness and numbness.       Objective   Physical Exam   Constitutional: He is oriented to person, place, and time. He appears well-developed and well-nourished.   HENT:   Head: Normocephalic.   Nose: Nose normal.   Neck: Carotid bruit is not present. No thyroid mass and no thyromegaly present.   Cardiovascular: Regular rhythm and normal heart sounds. Exam reveals no S3 and no S4.   No murmur heard.  Repeat bp left arm 118/80  No pedal edema    Pulmonary/Chest: Effort normal and breath sounds normal. He has no decreased breath sounds. He has no wheezes. He has no rhonchi. He has no rales.   Musculoskeletal: He exhibits no edema.   Neurological: He is alert and oriented to person, place, and time. Gait normal.   Skin: Skin is warm and dry.   Psychiatric: He has a normal mood and affect. His speech is normal and behavior is normal. Judgment and thought content normal. Cognition and memory are normal.       Assessment/Plan   Arash was seen today for hypothyroidism and hyperlipidemia.    Diagnoses and all orders for this visit:    Acquired hypothyroidism  Comments:  last TSH 3/2019-4.3  Orders:  -     TSH Rfx On Abnormal To Free T4; Future    Hyperlipidemia, unspecified hyperlipidemia type  Comments:  tolerated statin therapy   Orders:  -     Lipid Panel; Future  -     Comprehensive Metabolic Panel; Future  -     CBC No Differential; Future    Prostate cancer screening  -     PSA SCREENING; Future    Vitamin D deficiency  Comments:  stable       He was advised to hep A and second shingrix.   I reviewed most recent lab work with patient. He will return for fasting labs only.

## 2019-07-11 ENCOUNTER — LAB (OUTPATIENT)
Dept: INTERNAL MEDICINE | Facility: CLINIC | Age: 64
End: 2019-07-11

## 2019-07-11 DIAGNOSIS — Z12.5 PROSTATE CANCER SCREENING: ICD-10-CM

## 2019-07-11 DIAGNOSIS — E78.5 HYPERLIPIDEMIA, UNSPECIFIED HYPERLIPIDEMIA TYPE: ICD-10-CM

## 2019-07-11 DIAGNOSIS — E03.9 ACQUIRED HYPOTHYROIDISM: ICD-10-CM

## 2019-07-11 LAB
ALBUMIN SERPL-MCNC: 4.4 G/DL (ref 3.5–5.2)
ALBUMIN/GLOB SERPL: 1.5 G/DL
ALP SERPL-CCNC: 71 U/L (ref 39–117)
ALT SERPL W P-5'-P-CCNC: 32 U/L (ref 1–41)
ANION GAP SERPL CALCULATED.3IONS-SCNC: 11.4 MMOL/L (ref 5–15)
AST SERPL-CCNC: 23 U/L (ref 1–40)
BILIRUB SERPL-MCNC: 0.5 MG/DL (ref 0.2–1.2)
BUN BLD-MCNC: 12 MG/DL (ref 8–23)
BUN/CREAT SERPL: 11.7 (ref 7–25)
CALCIUM SPEC-SCNC: 9.7 MG/DL (ref 8.6–10.5)
CHLORIDE SERPL-SCNC: 105 MMOL/L (ref 98–107)
CHOLEST SERPL-MCNC: 161 MG/DL (ref 0–200)
CO2 SERPL-SCNC: 25.6 MMOL/L (ref 22–29)
CREAT BLD-MCNC: 1.03 MG/DL (ref 0.76–1.27)
DEPRECATED RDW RBC AUTO: 42.6 FL (ref 37–54)
ERYTHROCYTE [DISTWIDTH] IN BLOOD BY AUTOMATED COUNT: 12.3 % (ref 12.3–15.4)
GFR SERPL CREATININE-BSD FRML MDRD: 73 ML/MIN/1.73
GLOBULIN UR ELPH-MCNC: 2.9 GM/DL
GLUCOSE BLD-MCNC: 121 MG/DL (ref 65–99)
HCT VFR BLD AUTO: 42.6 % (ref 37.5–51)
HDLC SERPL-MCNC: 49 MG/DL (ref 40–60)
HGB BLD-MCNC: 14.7 G/DL (ref 13–17.7)
LDLC SERPL CALC-MCNC: 95 MG/DL (ref 0–100)
LDLC/HDLC SERPL: 1.94 {RATIO}
MCH RBC QN AUTO: 33.1 PG (ref 26.6–33)
MCHC RBC AUTO-ENTMCNC: 34.5 G/DL (ref 31.5–35.7)
MCV RBC AUTO: 95.9 FL (ref 79–97)
PLATELET # BLD AUTO: 155 10*3/MM3 (ref 140–450)
PMV BLD AUTO: 10.3 FL (ref 6–12)
POTASSIUM BLD-SCNC: 4.2 MMOL/L (ref 3.5–5.2)
PROT SERPL-MCNC: 7.3 G/DL (ref 6–8.5)
PSA SERPL-MCNC: 2.73 NG/ML (ref 0–4)
RBC # BLD AUTO: 4.44 10*6/MM3 (ref 4.14–5.8)
SODIUM BLD-SCNC: 142 MMOL/L (ref 136–145)
TRIGL SERPL-MCNC: 85 MG/DL (ref 0–150)
TSH SERPL-ACNC: 1.8 MIU/ML (ref 0.27–4.2)
VLDLC SERPL-MCNC: 17 MG/DL (ref 5–40)
WBC NRBC COR # BLD: 4.03 10*3/MM3 (ref 3.4–10.8)

## 2019-07-11 PROCEDURE — 80053 COMPREHEN METABOLIC PANEL: CPT | Performed by: NURSE PRACTITIONER

## 2019-07-11 PROCEDURE — 80061 LIPID PANEL: CPT | Performed by: NURSE PRACTITIONER

## 2019-07-11 PROCEDURE — 85027 COMPLETE CBC AUTOMATED: CPT | Performed by: NURSE PRACTITIONER

## 2019-10-09 ENCOUNTER — OFFICE VISIT (OUTPATIENT)
Dept: INTERNAL MEDICINE | Facility: CLINIC | Age: 64
End: 2019-10-09

## 2019-10-09 VITALS
HEART RATE: 69 BPM | HEIGHT: 68 IN | WEIGHT: 176.2 LBS | DIASTOLIC BLOOD PRESSURE: 78 MMHG | BODY MASS INDEX: 26.7 KG/M2 | SYSTOLIC BLOOD PRESSURE: 122 MMHG | OXYGEN SATURATION: 99 %

## 2019-10-09 DIAGNOSIS — E78.5 HYPERLIPIDEMIA, UNSPECIFIED HYPERLIPIDEMIA TYPE: Primary | ICD-10-CM

## 2019-10-09 DIAGNOSIS — Z23 NEED FOR VACCINATION WITH 13-POLYVALENT PNEUMOCOCCAL CONJUGATE VACCINE: ICD-10-CM

## 2019-10-09 DIAGNOSIS — E03.9 ACQUIRED HYPOTHYROIDISM: ICD-10-CM

## 2019-10-09 DIAGNOSIS — Z23 NEED FOR INFLUENZA VACCINATION: ICD-10-CM

## 2019-10-09 DIAGNOSIS — R73.01 ELEVATED FASTING GLUCOSE: ICD-10-CM

## 2019-10-09 PROCEDURE — 90662 IIV NO PRSV INCREASED AG IM: CPT | Performed by: NURSE PRACTITIONER

## 2019-10-09 PROCEDURE — 90670 PCV13 VACCINE IM: CPT | Performed by: NURSE PRACTITIONER

## 2019-10-09 PROCEDURE — 99213 OFFICE O/P EST LOW 20 MIN: CPT | Performed by: NURSE PRACTITIONER

## 2019-10-09 PROCEDURE — 90472 IMMUNIZATION ADMIN EACH ADD: CPT | Performed by: NURSE PRACTITIONER

## 2019-10-09 PROCEDURE — 90471 IMMUNIZATION ADMIN: CPT | Performed by: NURSE PRACTITIONER

## 2019-10-09 NOTE — PROGRESS NOTES
Subjective   Arash Roman is a 64 y.o. male.     He reports several years ago he was tested for apnea.       Hyperlipidemia   This is a chronic problem. The current episode started more than 1 year ago. The problem is controlled. Recent lipid tests were reviewed and are normal. Exacerbating diseases include hypothyroidism. Pertinent negatives include no chest pain, myalgias or shortness of breath. Current antihyperlipidemic treatment includes statins. The current treatment provides significant improvement of lipids.   Hypothyroidism   This is a chronic problem. The current episode started more than 1 year ago. Pertinent negatives include no chest pain, coughing, fatigue, fever, headaches or myalgias. Treatments tried: levothyroxine         The following portions of the patient's history were reviewed and updated as appropriate: allergies, current medications, past family history, past medical history, past social history, past surgical history and problem list.    Review of Systems   Constitutional: Negative for activity change, appetite change, fatigue and fever.   Respiratory: Negative for cough, shortness of breath and wheezing.         Snoring    Cardiovascular: Negative for chest pain, palpitations and leg swelling.   Gastrointestinal: Positive for constipation (chronic ).   Musculoskeletal: Positive for back pain (chronic, low back pain ). Negative for myalgias.   Neurological: Negative for dizziness and headaches.   Psychiatric/Behavioral:        Increased home stressors       Objective   Physical Exam   Constitutional: He is oriented to person, place, and time. He appears well-developed and well-nourished.   HENT:   Head: Normocephalic.   Nose: Nose normal.   Neck: Carotid bruit is not present. No thyroid mass and no thyromegaly present.   Cardiovascular: Regular rhythm and normal heart sounds. Exam reveals no S3 and no S4.   No murmur heard.  Repeat bp left arm 112/78  No pedal edema     Pulmonary/Chest: Effort normal and breath sounds normal. He has no decreased breath sounds. He has no wheezes. He has no rhonchi. He has no rales.   Musculoskeletal: He exhibits no edema.   Neurological: He is alert and oriented to person, place, and time. Gait normal.   Skin: Skin is warm and dry.   Psychiatric: He has a normal mood and affect.       Assessment/Plan   Arash was seen today for hypothyroidism and hyperlipidemia.    Diagnoses and all orders for this visit:    Hyperlipidemia, unspecified hyperlipidemia type  Comments:  tolerating crestor     Acquired hypothyroidism  Comments:  stable, last TSH 1.80    Elevated fasting glucose  Comments:  needs low carb/sugar diet and exercise   Orders:  -     Hemoglobin A1c; Future  -     Hemoglobin A1c    Need for influenza vaccination  -     FluZone High Dose 65YR+ (1546-7410)    Need for vaccination with 13-polyvalent pneumococcal conjugate vaccine  -     pneumococcal conj. 13-valent (PREVNAR-13) vaccine 0.5 mL      I reviewed recent lab work with patient.   He was advised to obtain second shingrix at local pharmacy (we are currently out of stock).

## 2019-10-10 LAB — HBA1C MFR BLD: 5 % (ref 4.8–5.6)

## 2019-10-16 DIAGNOSIS — E78.5 HYPERLIPIDEMIA, UNSPECIFIED HYPERLIPIDEMIA TYPE: ICD-10-CM

## 2019-10-16 RX ORDER — ROSUVASTATIN CALCIUM 5 MG/1
5 TABLET, COATED ORAL DAILY
Qty: 90 TABLET | Refills: 1 | Status: SHIPPED | OUTPATIENT
Start: 2019-10-16 | End: 2020-03-16 | Stop reason: SDUPTHER

## 2019-12-23 DIAGNOSIS — E03.9 HYPOTHYROIDISM, UNSPECIFIED TYPE: ICD-10-CM

## 2019-12-23 RX ORDER — LEVOTHYROXINE SODIUM 88 UG/1
TABLET ORAL
Qty: 90 TABLET | Refills: 1 | Status: SHIPPED | OUTPATIENT
Start: 2019-12-23 | End: 2020-03-16 | Stop reason: SDUPTHER

## 2019-12-26 ENCOUNTER — TELEPHONE (OUTPATIENT)
Dept: INTERNAL MEDICINE | Facility: CLINIC | Age: 64
End: 2019-12-26

## 2019-12-26 RX ORDER — BENZONATATE 200 MG/1
200 CAPSULE ORAL 3 TIMES DAILY PRN
Qty: 30 CAPSULE | Refills: 2 | Status: SHIPPED | OUTPATIENT
Start: 2019-12-26 | End: 2020-10-15

## 2019-12-26 RX ORDER — AZITHROMYCIN 250 MG/1
TABLET, FILM COATED ORAL
Qty: 6 TABLET | Refills: 0 | Status: SHIPPED | OUTPATIENT
Start: 2019-12-26 | End: 2020-01-30 | Stop reason: SDUPTHER

## 2019-12-26 NOTE — TELEPHONE ENCOUNTER
Pt called and stated he is experiencing upper respiratory and sinus symptoms. He asked for a Z pack and Valentina Gipson be called in to the [facility] on [address].    Pt callback [phone]

## 2020-03-16 ENCOUNTER — PATIENT MESSAGE (OUTPATIENT)
Dept: INTERNAL MEDICINE | Facility: CLINIC | Age: 65
End: 2020-03-16

## 2020-03-16 DIAGNOSIS — E78.5 HYPERLIPIDEMIA, UNSPECIFIED HYPERLIPIDEMIA TYPE: ICD-10-CM

## 2020-03-16 DIAGNOSIS — E03.9 HYPOTHYROIDISM, UNSPECIFIED TYPE: ICD-10-CM

## 2020-03-16 RX ORDER — ROSUVASTATIN CALCIUM 5 MG/1
5 TABLET, COATED ORAL DAILY
Qty: 90 TABLET | Refills: 0 | Status: SHIPPED | OUTPATIENT
Start: 2020-03-16 | End: 2020-06-23

## 2020-03-16 RX ORDER — LEVOTHYROXINE SODIUM 88 UG/1
88 TABLET ORAL DAILY
Qty: 90 TABLET | Refills: 0 | Status: SHIPPED | OUTPATIENT
Start: 2020-03-16 | End: 2020-06-23

## 2020-03-16 RX ORDER — ROSUVASTATIN CALCIUM 5 MG/1
5 TABLET, COATED ORAL DAILY
Qty: 90 TABLET | Refills: 1 | Status: CANCELLED | OUTPATIENT
Start: 2020-03-16

## 2020-03-16 RX ORDER — LEVOTHYROXINE SODIUM 88 UG/1
88 TABLET ORAL DAILY
Qty: 90 TABLET | Refills: 1 | Status: CANCELLED | OUTPATIENT
Start: 2020-03-16

## 2020-03-20 NOTE — TELEPHONE ENCOUNTER
From: Arash Roman  Sent: 3/20/2020 9:07 AM EDT  To: Aneudy Barton MA  Subject: RE: Prescription Question    Thank you. Do the same.     Laron    ----- Message -----  From: COURTNEY NICKERSON  Sent: 3/20/20, 8:47 AM  To: Arash Roman  Subject: RE: Prescription Question    Mr. Roman,    It look like there was a duplicate refill request that came in at the same time that I had already sent in you refills. So the duplicate refill request was denied.    I'm glad you were able to  your medications. Take care and have a great weekend.    Thank you,    Vincenzo Barton    [facility]      ----- Message -----   From: Arash Roman   Sent: 3/20/2020 8:23 AM EDT   To: COURTNEY NICKERSON  Subject: RE: Prescription Question    Vincenzo Barton    I have a message saying that my prescriptions are denied. I have already picked them up.     What is the message?    Thank you.     Laron Roman     ----- Message -----  From: COURTNEY NICKERSON  Sent: 3/16/20, 11:56 AM  To: Arash Roman  Subject: RE: Prescription Question    Mr. Roman,    You are more than welcome to reschedule your appointment with [facility]. Please call our office to do so. We would be happy to accommodate an refills that are needed. I will send in a refill to your requested pharmacy.    Thank you,    Vincenzo Barton    [facility]      ----- Message -----   From: Arash Roman   Sent: 3/16/2020 11:48 AM EDT   To: RAYMOND Garcia  Subject: Prescription Question    Can we reschedule appt? Tried to call. Let pandemic issues calm down.     Will you change pharmacy to [facility] and prescribe 90 days of Levothyroxin and Roussvastatin?

## 2020-06-23 DIAGNOSIS — E03.9 HYPOTHYROIDISM, UNSPECIFIED TYPE: ICD-10-CM

## 2020-06-23 DIAGNOSIS — E78.5 HYPERLIPIDEMIA, UNSPECIFIED HYPERLIPIDEMIA TYPE: ICD-10-CM

## 2020-06-23 RX ORDER — LEVOTHYROXINE SODIUM 88 UG/1
TABLET ORAL
Qty: 90 TABLET | Refills: 0 | Status: SHIPPED | OUTPATIENT
Start: 2020-06-23 | End: 2020-10-06 | Stop reason: SDUPTHER

## 2020-06-23 RX ORDER — ROSUVASTATIN CALCIUM 5 MG/1
TABLET, COATED ORAL
Qty: 90 TABLET | Refills: 0 | Status: SHIPPED | OUTPATIENT
Start: 2020-06-23 | End: 2020-10-06 | Stop reason: SDUPTHER

## 2020-10-06 ENCOUNTER — PATIENT MESSAGE (OUTPATIENT)
Dept: INTERNAL MEDICINE | Facility: CLINIC | Age: 65
End: 2020-10-06

## 2020-10-06 DIAGNOSIS — E03.9 HYPOTHYROIDISM, UNSPECIFIED TYPE: ICD-10-CM

## 2020-10-06 DIAGNOSIS — E78.5 HYPERLIPIDEMIA, UNSPECIFIED HYPERLIPIDEMIA TYPE: ICD-10-CM

## 2020-10-06 NOTE — TELEPHONE ENCOUNTER
Pt requested these through [employer].  I did reply to let him know I am sending you the request and asked him to call to schedule wellness visit.

## 2020-10-07 DIAGNOSIS — E03.9 HYPOTHYROIDISM, UNSPECIFIED TYPE: ICD-10-CM

## 2020-10-07 DIAGNOSIS — E78.5 HYPERLIPIDEMIA, UNSPECIFIED HYPERLIPIDEMIA TYPE: ICD-10-CM

## 2020-10-07 RX ORDER — ROSUVASTATIN CALCIUM 5 MG/1
5 TABLET, COATED ORAL DAILY
Qty: 30 TABLET | Refills: 0 | Status: SHIPPED | OUTPATIENT
Start: 2020-10-07 | End: 2020-10-08

## 2020-10-07 RX ORDER — LEVOTHYROXINE SODIUM 88 UG/1
88 TABLET ORAL DAILY
Qty: 30 TABLET | Refills: 0 | Status: SHIPPED | OUTPATIENT
Start: 2020-10-07 | End: 2020-10-08

## 2020-10-08 RX ORDER — LEVOTHYROXINE SODIUM 88 UG/1
88 TABLET ORAL DAILY
Qty: 90 TABLET | Refills: 0 | Status: SHIPPED | OUTPATIENT
Start: 2020-10-08 | End: 2020-10-09 | Stop reason: SDUPTHER

## 2020-10-08 RX ORDER — ROSUVASTATIN CALCIUM 5 MG/1
5 TABLET, COATED ORAL DAILY
Qty: 90 TABLET | Refills: 0 | Status: SHIPPED | OUTPATIENT
Start: 2020-10-08 | End: 2020-10-09 | Stop reason: SDUPTHER

## 2020-10-08 NOTE — TELEPHONE ENCOUNTER
From: Arash Roman  To: RAYMOND Garcia  Sent: 10/6/2020 10:57 AM EDT  Subject: Prescription Question    Dear Peyton Washington    Can I schedule a physical-wellness exam?    I also need refills at [facility] pharmacy on prescriptions, levothyroxin and rousavastatin.     Trust you are doing well.     Thank you.     Laron Roman

## 2020-10-09 DIAGNOSIS — E03.9 HYPOTHYROIDISM, UNSPECIFIED TYPE: ICD-10-CM

## 2020-10-09 DIAGNOSIS — E78.5 HYPERLIPIDEMIA, UNSPECIFIED HYPERLIPIDEMIA TYPE: ICD-10-CM

## 2020-10-09 RX ORDER — LEVOTHYROXINE SODIUM 88 UG/1
88 TABLET ORAL DAILY
Qty: 90 TABLET | Refills: 0 | Status: SHIPPED | OUTPATIENT
Start: 2020-10-09 | End: 2021-01-13

## 2020-10-09 RX ORDER — ROSUVASTATIN CALCIUM 5 MG/1
5 TABLET, COATED ORAL DAILY
Qty: 90 TABLET | Refills: 0 | Status: SHIPPED | OUTPATIENT
Start: 2020-10-09 | End: 2021-01-13

## 2020-10-09 NOTE — TELEPHONE ENCOUNTER
[place] [facility] [place] [facility] with the [place] pharmacy.

## 2020-10-15 ENCOUNTER — OFFICE VISIT (OUTPATIENT)
Dept: INTERNAL MEDICINE | Facility: CLINIC | Age: 65
End: 2020-10-15

## 2020-10-15 VITALS
BODY MASS INDEX: 27.22 KG/M2 | OXYGEN SATURATION: 99 % | SYSTOLIC BLOOD PRESSURE: 134 MMHG | TEMPERATURE: 97.7 F | HEART RATE: 74 BPM | HEIGHT: 68 IN | DIASTOLIC BLOOD PRESSURE: 84 MMHG | WEIGHT: 179.6 LBS

## 2020-10-15 DIAGNOSIS — E55.9 VITAMIN D DEFICIENCY: ICD-10-CM

## 2020-10-15 DIAGNOSIS — Z00.00 WELCOME TO MEDICARE PREVENTIVE VISIT: Primary | ICD-10-CM

## 2020-10-15 DIAGNOSIS — R73.01 ELEVATED FASTING GLUCOSE: ICD-10-CM

## 2020-10-15 DIAGNOSIS — E03.9 HYPOTHYROIDISM, UNSPECIFIED TYPE: ICD-10-CM

## 2020-10-15 DIAGNOSIS — Z12.5 PROSTATE CANCER SCREENING: ICD-10-CM

## 2020-10-15 DIAGNOSIS — E78.5 HYPERLIPIDEMIA, UNSPECIFIED HYPERLIPIDEMIA TYPE: ICD-10-CM

## 2020-10-15 PROCEDURE — G0403 EKG FOR INITIAL PREVENT EXAM: HCPCS | Performed by: NURSE PRACTITIONER

## 2020-10-15 PROCEDURE — G0402 INITIAL PREVENTIVE EXAM: HCPCS | Performed by: NURSE PRACTITIONER

## 2020-10-15 NOTE — PATIENT INSTRUCTIONS
Medicare Wellness  Personal Prevention Plan of Service     Date of Office Visit:  10/15/2020  Encounter Provider:  RAYMOND Garcia  Place of Service:  [facility] INTERNAL MEDICINE  Patient Name: Arash Roman  :  1955    As part of the Medicare Wellness portion of your visit today, we are providing you with this personalized preventive plan of services (PPPS). This plan is based upon recommendations of the [place] Preventive Services Task Force (USPSTF) and the Advisory Committee on Immunization Practices (ACIP).    This lists the preventive care services that should be considered, and provides dates of when you are due. Items listed as completed are up-to-date and do not require any further intervention.    Health Maintenance   Topic Date Due   • ANNUAL WELLNESS VISIT  2016   • ZOSTER VACCINE (3 of 3) 2019   • LIPID PANEL  2020   • Pneumococcal Vaccine 65+ (2 of 2 - PPSV23) 10/09/2020   • COLONOSCOPY  10/15/2020   • TDAP/TD VACCINES (3 - Td) 2028   • HEPATITIS C SCREENING  Completed   • INFLUENZA VACCINE  Completed       Orders Placed This Encounter   Procedures   • Hemoglobin A1c   • Comprehensive Metabolic Panel   • Lipid Panel   • TSH Rfx On Abnormal To Free T4   • PSA Screen   • Vitamin D 25 Hydroxy   • CBC & Differential     Order Specific Question:   Manual Differential     Answer:   No       Return in about 6 months (around 4/15/2021) for Recheck, HLD, thyroid .        Preventing Unhealthy Weight Gain, Adult  Staying at a healthy weight is important to your overall health. When fat builds up in your body, you may become overweight or obese. Being overweight or obese increases your risk of developing certain health problems, such as heart disease, diabetes, sleeping problems, joint problems, and some types of cancer.  Unhealthy weight gain is often the result of making unhealthy food choices or not getting enough exercise. You can make  changes to your lifestyle to prevent obesity and stay as healthy as possible.  What nutrition changes can be made?    · Eat only as much as your body needs. To do this:  ? Pay attention to signs that you are hungry or full. Stop eating as soon as you feel full.  ? If you feel hungry, try drinking water first before eating. Drink enough water so your urine is clear or pale yellow.  ? Eat smaller portions. Pay attention to portion sizes when eating out.  ? Look at serving sizes on food labels. Most foods contain more than one serving per container.  ? Eat the recommended number of calories for your gender and activity level. For most active people, a daily total of 2,000 calories is appropriate. If you are trying to lose weight or are not very active, you may need to eat fewer calories. Talk with your health care provider or a diet and nutrition specialist (dietitian) about how many calories you need each day.  · Choose healthy foods, such as:  ? Fruits and vegetables. At each meal, try to fill at least half of your plate with fruits and vegetables.  ? Whole grains, such as whole-wheat bread, brown rice, and quinoa.  ? Lean meats, such as chicken or fish.  ? Other healthy proteins, such as beans, eggs, or tofu.  ? Healthy fats, such as nuts, seeds, fatty fish, and olive oil.  ? Low-fat or fat-free dairy products.  · Check food labels, and avoid food and drinks that:  ? Are high in calories.  ? Have added sugar.  ? Are high in sodium.  ? Have saturated fats or trans fats.  · Cook foods in healthier ways, such as by baking, broiling, or grilling.  · Make a meal plan for the week, and shop with a grocery list to help you stay on track with your purchases. Try to avoid going to the grocery store when you are hungry.  · When grocery shopping, try to shop around the outside of the store first, where the fresh foods are. Doing this helps you to avoid prepackaged foods, which can be high in sugar, salt (sodium), and  fat.  What lifestyle changes can be made?    · Exercise for 30 or more minutes on 5 or more days each week. Exercising may include brisk walking, yard work, biking, running, swimming, and team sports like basketball and soccer. Ask your health care provider which exercises are safe for you.  · Do muscle-strengthening activities, such as lifting weights or using resistance bands, on 2 or more days a week.  · Do not use any products that contain nicotine or tobacco, such as cigarettes and e-cigarettes. If you need help quitting, ask your health care provider.  · Limit alcohol intake to no more than 1 drink a day for nonpregnant women and 2 drinks a day for men. One drink equals 12 oz of beer, 5 oz of wine, or 1½ oz of hard liquor.  · Try to get 7-9 hours of sleep each night.  What other changes can be made?  · Keep a food and activity journal to keep track of:  ? What you ate and how many calories you had. Remember to count the calories in sauces, dressings, and side dishes.  ? Whether you were active, and what exercises you did.  ? Your calorie, weight, and activity goals.  · Check your weight regularly. Track any changes. If you notice you have gained weight, make changes to your diet or activity routine.  · Avoid taking weight-loss medicines or supplements. Talk to your health care provider before starting any new medicine or supplement.  · Talk to your health care provider before trying any new diet or exercise plan.  Why are these changes important?  Eating healthy, staying active, and having healthy habits can help you to prevent obesity. Those changes also:  · Help you manage stress and emotions.  · Help you connect with friends and family.  · Improve your self-esteem.  · Improve your sleep.  · Prevent long-term health problems.  What can happen if changes are not made?  Being obese or overweight can cause you to develop joint or bone problems, which can make it hard for you to stay active or do activities you  enjoy. Being obese or overweight also puts stress on your heart and lungs and can lead to health problems like diabetes, heart disease, and some cancers.  Where to find more information  Talk with your health care provider or a dietitian about healthy eating and healthy lifestyle choices. You may also find information from:  · [place] Department of Agriculture, MyPlate: www.choosemyplate.gov  · American Heart Association: www.heart.org  · Centers for Disease Control and Prevention: www.cdc.gov  Summary  · Staying at a healthy weight is important to your overall health. It helps you to prevent certain diseases and health problems, such as heart disease, diabetes, joint problems, sleep disorders, and some types of cancer.  · Being obese or overweight can cause you to develop joint or bone problems, which can make it hard for you to stay active or do activities you enjoy.  · You can prevent unhealthy weight gain by eating a healthy diet, exercising regularly, not smoking, limiting alcohol, and getting enough sleep.  · Talk with your health care provider or a dietitian for guidance about healthy eating and healthy lifestyle choices.  This information is not intended to replace advice given to you by your health care provider. Make sure you discuss any questions you have with your health care provider.  Document Released: 12/19/2017 Document Revised: 12/21/2018 Document Reviewed: 01/24/2018  [employer] Patient Education © 2020 [employer]    Healthy Eating  Following a healthy eating pattern may help you to achieve and maintain a healthy body weight, reduce the risk of chronic disease, and live a long and productive life. It is important to follow a healthy eating pattern at an appropriate calorie level for your body. Your nutritional needs should be met primarily through food by choosing a variety of nutrient-rich foods.  What are tips for following this plan?  Reading food labels  · Read labels and choose the  "following:  ? Reduced or low sodium.  ? Juices with 100% fruit juice.  ? Foods with low saturated fats and high polyunsaturated and monounsaturated fats.  ? Foods with whole grains, such as whole wheat, cracked wheat, brown rice, and wild rice.  ? Whole grains that are fortified with folic acid. This is recommended for women who are pregnant or who want to become pregnant.  · Read labels and avoid the following:  ? Foods with a lot of added sugars. These include foods that contain brown sugar, corn sweetener, corn syrup, dextrose, fructose, glucose, high-fructose corn syrup, honey, invert sugar, lactose, malt syrup, maltose, molasses, raw sugar, sucrose, trehalose, or turbinado sugar.  § Do not eat more than the following amounts of added sugar per day:  § 6 teaspoons (25 g) for women.  § 9 teaspoons (38 g) for men.  ? Foods that contain processed or refined starches and grains.  ? Refined grain products, such as white flour, degermed cornmeal, white bread, and white rice.  Shopping  · Choose nutrient-rich snacks, such as vegetables, whole fruits, and nuts. Avoid high-calorie and high-sugar snacks, such as potato chips, fruit snacks, and candy.  · Use oil-based dressings and spreads on foods instead of solid fats such as butter, stick margarine, or cream cheese.  · Limit pre-made sauces, mixes, and \"instant\" products such as flavored rice, instant noodles, and ready-made pasta.  · Try more plant-protein sources, such as tofu, tempeh, black beans, edamame, lentils, nuts, and seeds.  · Explore eating plans such as the Mediterranean diet or vegetarian diet.  Cooking  · Use oil to sauté or stir-elizalde foods instead of solid fats such as butter, stick margarine, or lard.  · Try baking, boiling, grilling, or broiling instead of frying.  · Remove the fatty part of meats before cooking.  · Steam vegetables in water or broth.  Meal planning    · At meals, imagine dividing your plate into fourths:  ? One-half of your plate is " fruits and vegetables.  ? One-fourth of your plate is whole grains.  ? One-fourth of your plate is protein, especially lean meats, poultry, eggs, tofu, beans, or nuts.  · Include low-fat dairy as part of your daily diet.  Lifestyle  · Choose healthy options in all settings, including home, work, school, restaurants, or stores.  · Prepare your food safely:  ? Wash your hands after handling raw meats.  ? Keep food preparation surfaces clean by regularly washing with hot, soapy water.  ? Keep raw meats separate from ready-to-eat foods, such as fruits and vegetables.  ? , meat, poultry, and eggs to the recommended internal temperature.  ? Store foods at safe temperatures. In general:  § Keep cold foods at 40°F (4.4°C) or below.  § Keep hot foods at 140°F (60°C) or above.  § Keep your freezer at 0°F (-17.8°C) or below.  § Foods are no longer safe to eat when they have been between the temperatures of 40°-140°F (4.4-60°C) for more than 2 hours.  What foods should I eat?  Fruits  Aim to eat 2 cup-equivalents of fresh, canned (in natural juice), or frozen fruits each day. Examples of 1 cup-equivalent of fruit include 1 small apple, 8 large strawberries, 1 cup canned fruit, ½ cup dried fruit, or 1 cup 100% juice.  Vegetables  Aim to eat 2½-3 cup-equivalents of fresh and frozen vegetables each day, including different varieties and colors. Examples of 1 cup-equivalent of vegetables include 2 medium carrots, 2 cups raw, leafy greens, 1 cup chopped vegetable (raw or cooked), or 1 medium baked potato.  Grains  Aim to eat 6 ounce-equivalents of whole grains each day. Examples of 1 ounce-equivalent of grains include 1 slice of bread, 1 cup ready-to-eat cereal, 3 cups popcorn, or ½ cup cooked rice, pasta, or cereal.  Meats and other proteins  Aim to eat 5-6 ounce-equivalents of protein each day. Examples of 1 ounce-equivalent of protein include 1 egg, 1/2 cup nuts or seeds, or 1 tablespoon (16 g) peanut butter. A cut of  meat or fish that is the size of a deck of cards is about 3-4 ounce-equivalents.  · Of the protein you eat each week, try to have at least 8 ounces come from seafood. This includes salmon, trout, herring, and anchovies.  Dairy  Aim to eat 3 cup-equivalents of fat-free or low-fat dairy each day. Examples of 1 cup-equivalent of dairy include 1 cup (240 mL) milk, 8 ounces (250 g) yogurt, 1½ ounces (44 g) natural cheese, or 1 cup (240 mL) fortified soy milk.  Fats and oils  · Aim for about 5 teaspoons (21 g) per day. Choose monounsaturated fats, such as canola and olive oils, avocados, peanut butter, and most nuts, or polyunsaturated fats, such as sunflower, corn, and soybean oils, walnuts, pine nuts, sesame seeds, sunflower seeds, and flaxseed.  Beverages  · Aim for six 8-oz glasses of water per day. Limit coffee to three to five 8-oz cups per day.  · Limit caffeinated beverages that have added calories, such as soda and energy drinks.  · Limit alcohol intake to no more than 1 drink a day for nonpregnant women and 2 drinks a day for men. One drink equals 12 oz of beer (355 mL), 5 oz of wine (148 mL), or 1½ oz of hard liquor (44 mL).  Seasoning and other foods  · Avoid adding excess amounts of salt to your foods. Try flavoring foods with herbs and spices instead of salt.  · Avoid adding sugar to foods.  · Try using oil-based dressings, sauces, and spreads instead of solid fats.  This information is based on general [place] nutrition guidelines. For more information, visit choosemyplate.gov. Exact amounts may vary based on your nutrition needs.  Summary  · A healthy eating plan may help you to maintain a healthy weight, reduce the risk of chronic diseases, and stay active throughout your life.  · Plan your meals. Make sure you eat the right portions of a variety of nutrient-rich foods.  · Try baking, boiling, grilling, or broiling instead of frying.  · Choose healthy options in all settings, including home, work, school,  restaurants, or stores.  This information is not intended to replace advice given to you by your health care provider. Make sure you discuss any questions you have with your health care provider.  Document Released: 04/01/2019 Document Revised: 04/01/2019 Document Reviewed: 04/01/2019  [employer] Patient Education © 2020 [employer]

## 2020-10-15 NOTE — PROGRESS NOTES
The ABCs of the Annual Wellness Visit  Welcome to Medicare Visit    Chief Complaint   Patient presents with   • Welcome To Medicare       Subjective   History of Present Illness:  Arash Roman is a 65 y.o. male who presents for a  Welcome to Medicare Visit.    HEALTH RISK ASSESSMENT    Recent Hospitalizations:  No hospitalization(s) within the last year.    Current Medical Providers:  Patient Care Team:  Peyton Washington APRN as PCP - General (Family Medicine)  Hakan Yates MD as PCP - Claims Attributed  Juno Steven MD as Consulting Physician (Gastroenterology)    Smoking Status:  Social History     Tobacco Use   Smoking Status Never Smoker   Smokeless Tobacco Never Used       Alcohol Consumption:  Social History     Substance and Sexual Activity   Alcohol Use Yes    Comment: socially, 1-3 x per week       Depression Screen:   PHQ-2/PHQ-9 Depression Screening 10/15/2020   Little interest or pleasure in doing things 0   Feeling down, depressed, or hopeless 1   Trouble falling or staying asleep, or sleeping too much 2   Feeling tired or having little energy 0   Poor appetite or overeating 0   Feeling bad about yourself - or that you are a failure or have let yourself or your family down 0   Trouble concentrating on things, such as reading the newspaper or watching television 0   Moving or speaking so slowly that other people could have noticed. Or the opposite - being so fidgety or restless that you have been moving around a lot more than usual 0   Thoughts that you would be better off dead, or of hurting yourself in some way 0   Total Score 3   If you checked off any problems, how difficult have these problems made it for you to do your work, take care of things at home, or get along with other people? Somewhat difficult       Fall Risk Screen:  STEADI Fall Risk Assessment was completed, and patient is at LOW risk for falls.Assessment completed on:10/15/2020    Health Habits and Functional and  Cognitive Screening:  Functional & Cognitive Status 10/15/2020   Do you have difficulty preparing food and eating? No   Do you have difficulty bathing yourself, getting dressed or grooming yourself? No   Do you have difficulty using the toilet? No   Do you have difficulty moving around from place to place? No   Do you have trouble with steps or getting out of a bed or a chair? No   Current Diet Well Balanced Diet   Dental Exam Not up to date   Eye Exam Not up to date   Exercise (times per week) 2 times per week   Current Exercises Include Walking   Do you need help using the phone?  No   Are you deaf or do you have serious difficulty hearing?  No   Do you need help with transportation? No   Do you need help shopping? No   Do you need help preparing meals?  No   Do you need help with housework?  No   Do you need help with laundry? No   Do you need help taking your medications? No   Do you need help managing money? No   Do you ever drive or ride in a car without wearing a seat belt? No   Have you felt unusual stress, anger or loneliness in the last month? No   Who do you live with? Spouse   If you need help, do you have trouble finding someone available to you? No   Do you have difficulty concentrating, remembering or making decisions? No         Does the patient have evidence of cognitive impairment? No    Asprin use counseling:Does not need ASA (and currently is not on it)    Visual Acuity:    No exam data present    Age-appropriate Screening Schedule:  Refer to the list below for future screening recommendations based on patient's age, sex and/or medical conditions. Orders for these recommended tests are listed in the plan section. The patient has been provided with a written plan.    Health Maintenance   Topic Date Due   • ZOSTER VACCINE (3 of 3) 04/29/2019   • LIPID PANEL  07/11/2020   • COLONOSCOPY  10/15/2020   • TDAP/TD VACCINES (3 - Td) 03/19/2028   • INFLUENZA VACCINE  Completed          The following  portions of the patient's history were reviewed and updated as appropriate: allergies, current medications, past family history, past medical history, past social history, past surgical history and problem list.    Outpatient Medications Prior to Visit   Medication Sig Dispense Refill   • Azelastine-Fluticasone 137-50 MCG/ACT suspension 1 spray into each nostril daily. (Patient taking differently: 1 spray into each nostril Daily As Needed.) 23 g 11   • cetirizine (zyrTEC) 10 MG tablet Take 10 mg by mouth Daily.     • cholecalciferol (VITAMIN D3) 1000 units tablet Take 1,000 Units by mouth Daily.     • levothyroxine (SYNTHROID, LEVOTHROID) 88 MCG tablet Take 1 tablet by mouth Daily. 90 tablet 0   • rosuvastatin (CRESTOR) 5 MG tablet Take 1 tablet by mouth Daily. 90 tablet 0   • azithromycin (ZITHROMAX Z-SHEILA) 250 MG tablet Take 2 tablets the first day, then 1 tablet daily for 4 days. 6 tablet 0   • benzonatate (TESSALON) 200 MG capsule Take 1 capsule by mouth 3 (Three) Times a Day As Needed for Cough. 30 capsule 2     No facility-administered medications prior to visit.        Patient Active Problem List   Diagnosis   • Hypothyroidism   • Induratio penis plastica   • Hyperlipidemia   • Depression   • Peyronie's disease   • Adhesive capsulitis of left shoulder   • Allergic rhinitis   • Vitamin D deficiency   • Colon polyp   • Family history of colon cancer   • Skin lesion of cheek       Advanced Care Planning:  ACP discussion was held with the patient during this visit. Patient has an advance directive in EMR which is still valid.     Review of Systems   Constitutional: Negative for activity change, appetite change, chills, fatigue, fever and unexpected weight change.   HENT: Negative for congestion, ear discharge, ear pain, hearing loss, mouth sores, nosebleeds, postnasal drip, rhinorrhea, sinus pressure, sneezing, sore throat, tinnitus and voice change.    Eyes: Negative for visual disturbance.   Respiratory:  "Negative for cough, chest tightness, shortness of breath and wheezing.    Cardiovascular: Negative for chest pain, palpitations and leg swelling.   Gastrointestinal: Negative for abdominal distention, abdominal pain, anal bleeding, blood in stool, constipation, diarrhea, nausea and vomiting.   Endocrine: Negative for cold intolerance, heat intolerance, polydipsia, polyphagia and polyuria.   Genitourinary: Negative for difficulty urinating, discharge, frequency, hematuria, penile pain, penile swelling, scrotal swelling, testicular pain and urgency.   Musculoskeletal: Positive for back pain (chronic low back ). Negative for arthralgias, gait problem, joint swelling, myalgias, neck pain and neck stiffness.   Skin: Negative for color change, pallor and rash.        NEGATIVE BREAST MASS, BREAST PAIN, NIPPLE DISCHARGE, SKIN CHANGES   Neurological: Negative for dizziness, tremors, seizures, syncope, speech difficulty, weakness, light-headedness, numbness and headaches.   Hematological: Negative for adenopathy. Does not bruise/bleed easily.   Psychiatric/Behavioral: Negative for confusion, decreased concentration, dysphoric mood, sleep disturbance and suicidal ideas. The patient is not nervous/anxious.         Current stressors with spouse with breast cancer-doing much better       Compared to one year ago, the patient feels his physical health is the same.  Compared to one year ago, the patient feels his mental health is worse.    Reviewed chart for potential of high risk medication in the elderly: not applicable  Reviewed chart for potential of harmful drug interactions in the elderly:not applicable    Objective         Vitals:    10/15/20 1033   BP: 134/84   BP Location: Left arm   Patient Position: Sitting   Cuff Size: Adult   Pulse: 74   Temp: 97.7 °F (36.5 °C)   SpO2: 99%   Weight: 81.5 kg (179 lb 9.6 oz)   Height: 171.5 cm (67.5\")       Body mass index is 27.71 kg/m².  Discussed the patient's BMI with him. The BMI is " above average; BMI management plan is completed.    Physical Exam  Constitutional:       Appearance: He is well-developed.   HENT:      Head: Normocephalic.      Right Ear: Hearing, tympanic membrane, ear canal and external ear normal.      Left Ear: Hearing, tympanic membrane, ear canal and external ear normal.      Nose: Nose normal.   Eyes:      General: Lids are normal.      Extraocular Movements: Extraocular movements intact.      Conjunctiva/sclera: Conjunctivae normal.      Pupils: Pupils are equal, round, and reactive to light.   Neck:      Musculoskeletal: Normal range of motion.      Thyroid: No thyromegaly.      Trachea: No tracheal deviation.   Cardiovascular:      Rate and Rhythm: Normal rate and regular rhythm.      Pulses: Normal pulses.      Heart sounds: Normal heart sounds. No murmur. No friction rub. No gallop.       Comments: No pedal edmea   Pulmonary:      Effort: Pulmonary effort is normal. No respiratory distress.      Breath sounds: Normal breath sounds. No wheezing or rales.   Chest:      Chest wall: No tenderness.   Abdominal:      General: Bowel sounds are normal. There is no distension.      Palpations: Abdomen is soft.      Tenderness: There is no abdominal tenderness.      Hernia: There is no hernia in the left inguinal area or right inguinal area.   Genitourinary:     Penis: Normal.       Scrotum/Testes: Normal.      Epididymis:      Right: Normal.      Rectum: Guaiac result negative.   Musculoskeletal:         General: No tenderness or deformity.      Lumbar back: He exhibits decreased range of motion.      Comments: (-) SLR    Lymphadenopathy:      Head:      Right side of head: No submental, submandibular, tonsillar, preauricular, posterior auricular or occipital adenopathy.      Left side of head: No submental, submandibular, tonsillar, preauricular, posterior auricular or occipital adenopathy.      Cervical: No cervical adenopathy.      Lower Body: No right inguinal adenopathy.  No left inguinal adenopathy.   Skin:     General: Skin is warm.      Coloration: Skin is not pale.      Findings: No erythema.   Neurological:      Mental Status: He is alert and oriented to person, place, and time.      Cranial Nerves: No cranial nerve deficit.      Motor: No abnormal muscle tone.      Coordination: Coordination normal.      Gait: Gait is intact.      Deep Tendon Reflexes: Reflexes normal.      Reflex Scores:       Patellar reflexes are 2+ on the right side and 2+ on the left side.  Psychiatric:         Attention and Perception: Attention normal.         Mood and Affect: Mood and affect normal.         Speech: Speech normal.         Behavior: Behavior normal.         Thought Content: Thought content normal.         Cognition and Memory: Cognition normal.         Judgment: Judgment normal.                 ECG 12 Lead    Date/Time: 10/15/2020 11:00 AM  Performed by: Peyton Washington APRN  Authorized by: Peyton Washington APRN   Comparison: not compared with previous ECG   Previous ECG: no previous ECG available  Rhythm: sinus rhythm  Rate: normal  Conduction: conduction normal  ST Segments: ST segments normal  T Waves: T waves normal  QRS axis: normal    Clinical impression: normal ECG            Assessment/Plan   Medicare Risks and Personalized Health Plan  CMS Preventative Services Quick Reference  Abdominal Aortic Aneurysm Screening  Advance Directive Discussion  Immunizations Discussed/Encouraged (specific immunizations; Pneumococcal 23 and Shingrix )  Prostate Cancer Screening     The above risks/problems have been discussed with the patient.  Pertinent information has been shared with the patient in the After Visit Summary.  Follow up plans and orders are seen below in the Assessment/Plan Section.    Diagnoses and all orders for this visit:    1. Welcome to Medicare preventive visit (Primary)    2. Hyperlipidemia, unspecified hyperlipidemia type  -     CBC & Differential  -     Comprehensive  Metabolic Panel  -     Lipid Panel  -     ECG 12 Lead    3. Hypothyroidism, unspecified type  -     TSH Rfx On Abnormal To Free T4    4. Elevated fasting glucose  -     Hemoglobin A1c    5. Prostate cancer screening  -     PSA Screen    6. Vitamin D deficiency  -     Vitamin D 25 Hydroxy        Follow Up:  Return in about 6 months (around 4/15/2021) for Recheck, HLD, thyroid .     An After Visit Summary and PPPS were given to the patient.    He will provide copy of living will  He has called GI to schedule colonoscopy   He was given handout for vascular screening    He suspects he got pneumonia vaccination and will let me know at local pharmacy.   Discussed need for cardio at least 150 minutes weekly and healthy diet

## 2020-10-16 LAB
25(OH)D3+25(OH)D2 SERPL-MCNC: 63.6 NG/ML (ref 30–100)
ALBUMIN SERPL-MCNC: 4.7 G/DL (ref 3.5–5.2)
ALBUMIN/GLOB SERPL: 2.4 G/DL
ALP SERPL-CCNC: 68 U/L (ref 39–117)
ALT SERPL-CCNC: 30 U/L (ref 1–41)
AST SERPL-CCNC: 21 U/L (ref 1–40)
BASOPHILS # BLD AUTO: 0.03 10*3/MM3 (ref 0–0.2)
BASOPHILS NFR BLD AUTO: 0.7 % (ref 0–1.5)
BILIRUB SERPL-MCNC: 0.6 MG/DL (ref 0–1.2)
BUN SERPL-MCNC: 19 MG/DL (ref 8–23)
BUN/CREAT SERPL: 17.9 (ref 7–25)
CALCIUM SERPL-MCNC: 9.1 MG/DL (ref 8.6–10.5)
CHLORIDE SERPL-SCNC: 108 MMOL/L (ref 98–107)
CHOLEST SERPL-MCNC: 180 MG/DL (ref 0–200)
CO2 SERPL-SCNC: 25.3 MMOL/L (ref 22–29)
CREAT SERPL-MCNC: 1.06 MG/DL (ref 0.76–1.27)
EOSINOPHIL # BLD AUTO: 0.12 10*3/MM3 (ref 0–0.4)
EOSINOPHIL NFR BLD AUTO: 2.6 % (ref 0.3–6.2)
ERYTHROCYTE [DISTWIDTH] IN BLOOD BY AUTOMATED COUNT: 12.2 % (ref 12.3–15.4)
GLOBULIN SER CALC-MCNC: 2 GM/DL
GLUCOSE SERPL-MCNC: 102 MG/DL (ref 65–99)
HBA1C MFR BLD: 5 % (ref 4.8–5.6)
HCT VFR BLD AUTO: 45.8 % (ref 37.5–51)
HDLC SERPL-MCNC: 44 MG/DL (ref 40–60)
HGB BLD-MCNC: 15.4 G/DL (ref 13–17.7)
IMM GRANULOCYTES # BLD AUTO: 0.02 10*3/MM3 (ref 0–0.05)
IMM GRANULOCYTES NFR BLD AUTO: 0.4 % (ref 0–0.5)
LDLC SERPL CALC-MCNC: 112 MG/DL (ref 0–100)
LYMPHOCYTES # BLD AUTO: 0.94 10*3/MM3 (ref 0.7–3.1)
LYMPHOCYTES NFR BLD AUTO: 20.5 % (ref 19.6–45.3)
MCH RBC QN AUTO: 32.3 PG (ref 26.6–33)
MCHC RBC AUTO-ENTMCNC: 33.6 G/DL (ref 31.5–35.7)
MCV RBC AUTO: 96 FL (ref 79–97)
MONOCYTES # BLD AUTO: 0.5 10*3/MM3 (ref 0.1–0.9)
MONOCYTES NFR BLD AUTO: 10.9 % (ref 5–12)
NEUTROPHILS # BLD AUTO: 2.98 10*3/MM3 (ref 1.7–7)
NEUTROPHILS NFR BLD AUTO: 64.9 % (ref 42.7–76)
NRBC BLD AUTO-RTO: 0 /100 WBC (ref 0–0.2)
PLATELET # BLD AUTO: 175 10*3/MM3 (ref 140–450)
POTASSIUM SERPL-SCNC: 4.3 MMOL/L (ref 3.5–5.2)
PROT SERPL-MCNC: 6.7 G/DL (ref 6–8.5)
PSA SERPL-MCNC: 2.5 NG/ML (ref 0–4)
RBC # BLD AUTO: 4.77 10*6/MM3 (ref 4.14–5.8)
SODIUM SERPL-SCNC: 143 MMOL/L (ref 136–145)
TRIGL SERPL-MCNC: 135 MG/DL (ref 0–150)
TSH SERPL DL<=0.005 MIU/L-ACNC: 4 UIU/ML (ref 0.27–4.2)
VLDLC SERPL CALC-MCNC: 24 MG/DL (ref 5–40)
WBC # BLD AUTO: 4.59 10*3/MM3 (ref 3.4–10.8)

## 2020-10-17 ENCOUNTER — PREP FOR SURGERY (OUTPATIENT)
Dept: OTHER | Facility: HOSPITAL | Age: 65
End: 2020-10-17

## 2020-10-17 DIAGNOSIS — K63.5 COLON POLYP: Primary | ICD-10-CM

## 2020-11-03 ENCOUNTER — TRANSCRIBE ORDERS (OUTPATIENT)
Dept: ADMINISTRATIVE | Facility: HOSPITAL | Age: 65
End: 2020-11-03

## 2020-11-03 DIAGNOSIS — Z01.818 OTHER SPECIFIED PRE-OPERATIVE EXAMINATION: Primary | ICD-10-CM

## 2020-11-11 ENCOUNTER — LAB (OUTPATIENT)
Dept: LAB | Facility: HOSPITAL | Age: 65
End: 2020-11-11

## 2020-11-11 DIAGNOSIS — Z01.818 OTHER SPECIFIED PRE-OPERATIVE EXAMINATION: ICD-10-CM

## 2020-11-11 PROCEDURE — U0004 COV-19 TEST NON-CDC HGH THRU: HCPCS

## 2020-11-11 PROCEDURE — C9803 HOPD COVID-19 SPEC COLLECT: HCPCS

## 2020-11-12 LAB — SARS-COV-2 RNA RESP QL NAA+PROBE: NOT DETECTED

## 2020-11-13 ENCOUNTER — ANESTHESIA EVENT (OUTPATIENT)
Dept: GASTROENTEROLOGY | Facility: HOSPITAL | Age: 65
End: 2020-11-13

## 2020-11-13 ENCOUNTER — ANESTHESIA (OUTPATIENT)
Dept: GASTROENTEROLOGY | Facility: HOSPITAL | Age: 65
End: 2020-11-13

## 2020-11-13 ENCOUNTER — HOSPITAL ENCOUNTER (OUTPATIENT)
Facility: HOSPITAL | Age: 65
Setting detail: HOSPITAL OUTPATIENT SURGERY
Discharge: HOME | End: 2020-11-13
Attending: INTERNAL MEDICINE | Admitting: INTERNAL MEDICINE

## 2020-11-13 VITALS
RESPIRATION RATE: 16 BRPM | HEART RATE: 76 BPM | SYSTOLIC BLOOD PRESSURE: 112 MMHG | OXYGEN SATURATION: 100 % | TEMPERATURE: 98.1 F | HEIGHT: 68 IN | WEIGHT: 175 LBS | DIASTOLIC BLOOD PRESSURE: 72 MMHG | BODY MASS INDEX: 26.52 KG/M2

## 2020-11-13 PROCEDURE — 25010000002 PROPOFOL 10 MG/ML EMULSION: Performed by: ANESTHESIOLOGY

## 2020-11-13 PROCEDURE — G0105 COLORECTAL SCRN; HI RISK IND: HCPCS | Performed by: INTERNAL MEDICINE

## 2020-11-13 RX ORDER — SODIUM CHLORIDE, SODIUM LACTATE, POTASSIUM CHLORIDE, CALCIUM CHLORIDE 600; 310; 30; 20 MG/100ML; MG/100ML; MG/100ML; MG/100ML
1000 INJECTION, SOLUTION INTRAVENOUS CONTINUOUS
Status: DISCONTINUED | OUTPATIENT
Start: 2020-11-13 | End: 2020-11-13 | Stop reason: HOSPADM

## 2020-11-13 RX ORDER — LIDOCAINE HYDROCHLORIDE 20 MG/ML
INJECTION, SOLUTION INFILTRATION; PERINEURAL AS NEEDED
Status: DISCONTINUED | OUTPATIENT
Start: 2020-11-13 | End: 2020-11-13 | Stop reason: SURG

## 2020-11-13 RX ORDER — PROPOFOL 10 MG/ML
VIAL (ML) INTRAVENOUS CONTINUOUS PRN
Status: DISCONTINUED | OUTPATIENT
Start: 2020-11-13 | End: 2020-11-13 | Stop reason: SURG

## 2020-11-13 RX ORDER — PROPOFOL 10 MG/ML
VIAL (ML) INTRAVENOUS AS NEEDED
Status: DISCONTINUED | OUTPATIENT
Start: 2020-11-13 | End: 2020-11-13 | Stop reason: SURG

## 2020-11-13 RX ADMIN — PROPOFOL 80 MG: 10 INJECTION, EMULSION INTRAVENOUS at 08:04

## 2020-11-13 RX ADMIN — LIDOCAINE HYDROCHLORIDE 60 MG: 20 INJECTION, SOLUTION INFILTRATION; PERINEURAL at 08:01

## 2020-11-13 RX ADMIN — PROPOFOL 140 MCG/KG/MIN: 10 INJECTION, EMULSION INTRAVENOUS at 08:04

## 2020-11-13 RX ADMIN — SODIUM CHLORIDE, POTASSIUM CHLORIDE, SODIUM LACTATE AND CALCIUM CHLORIDE 1000 ML: 600; 310; 30; 20 INJECTION, SOLUTION INTRAVENOUS at 07:39

## 2020-11-13 NOTE — H&P
"[facility]  Pre Procedure History & Physical    Chief Complaint:   Time for my colonoscopy    Subjective     HPI:   65 y.o. male whose dad had colon cancer and who has a personal h/o colon polyps. She last had a colonoscopy in 10/18.    Past Medical History:   Past Medical History:   Diagnosis Date   • Allergic rhinitis    • Basal cell carcinoma     followed by Dermatology, Dr. Lopez   • Colon polyp    • Depression    • Frozen shoulder    • Hypercholesteremia    • Hypothyroidism    • Peyronie's disease    • Vitamin D deficiency        Family History:  Family History   Problem Relation Age of Onset   • Colon polyps Father    • Kidney disease Mother    • Alcohol abuse Mother    • Other Mother         prostate disease   • Heart disease Maternal Uncle        Social History:   reports that he has never smoked. He has never used smokeless tobacco. He reports current alcohol use. He reports that he does not use drugs.    Medications:   Medications Prior to Admission   Medication Sig Dispense Refill Last Dose   • Azelastine-Fluticasone 137-50 MCG/ACT suspension 1 spray into each nostril daily. (Patient taking differently: 1 spray into each nostril Daily As Needed.) 23 g 11 Past Month at Unknown time   • cetirizine (zyrTEC) 10 MG tablet Take 10 mg by mouth Daily.   11/12/2020 at Unknown time   • cholecalciferol (VITAMIN D3) 1000 units tablet Take 1,000 Units by mouth Daily.   11/12/2020 at Unknown time   • levothyroxine (SYNTHROID, LEVOTHROID) 88 MCG tablet Take 1 tablet by mouth Daily. 90 tablet 0 11/12/2020 at Unknown time   • rosuvastatin (CRESTOR) 5 MG tablet Take 1 tablet by mouth Daily. 90 tablet 0 11/12/2020 at Unknown time       Allergies:  Patient has no known allergies.    ROS:    Pertinent items are noted in HPI     Objective     Height 172.7 cm (68\"), weight 79.4 kg (175 lb).    Physical Exam   Constitutional: Pt is oriented to person, place, and time and well-developed, well-nourished, " and in no distress.   HENT:   Mouth/Throat: Oropharynx is clear and moist.   Neck: Normal range of motion. Neck supple.   Cardiovascular: Normal rate, regular rhythm and normal heart sounds.    Pulmonary/Chest: Effort normal and breath sounds normal. No respiratory distress. No  wheezes.   Abdominal: Soft. Bowel sounds are normal.   Skin: Skin is warm and dry.   Psychiatric: Mood, memory, affect and judgment normal.     Assessment/Plan     Diagnosis:  65 y.o. male whose dad had colon cancer and who has a personal h/o colon polyps. She last had a colonoscopy in 10/18.    Anticipated Surgical Procedure:  Colonoscopy    The risks, benefits, and alternatives of this procedure have been discussed with the patient or the responsible party- the patient understands and agrees to proceed.    Juno Steven M.D.

## 2020-11-13 NOTE — ANESTHESIA PREPROCEDURE EVALUATION
Anesthesia Evaluation     Patient summary reviewed and Nursing notes reviewed   no history of anesthetic complications:  NPO Solid Status: > 8 hours  NPO Liquid Status: > 2 hours           Airway   Mallampati: II  TM distance: >3 FB  Neck ROM: full  No difficulty expected  Dental    (+) implants    Pulmonary - normal exam   Cardiovascular - normal exam  Exercise tolerance: excellent (>7 METS)    (+) hyperlipidemia,   (-) angina, PND, LUNDBERG      Neuro/Psych  (+) psychiatric history Depression,     GI/Hepatic/Renal/Endo    (+)   thyroid problem hypothyroidism    Musculoskeletal     Abdominal  - normal exam   Substance History      OB/GYN          Other   arthritis (left shoulder),    history of cancer                    Anesthesia Plan    ASA 2     MAC     intravenous induction     Anesthetic plan, all risks, benefits, and alternatives have been provided, discussed and informed consent has been obtained with: patient.

## 2020-11-13 NOTE — ANESTHESIA POSTPROCEDURE EVALUATION
Patient: Arash Roman    Procedure Summary     Date: 11/13/20 Room / Location:  [facility]    Anesthesia Start: 0758 Anesthesia Stop: 0834    Procedure: COLONOSCOPY to cecum and TI: (N/A ) Diagnosis:       Colon polyp      (Colon polyp [K63.5])    Surgeon: Juno Steven MD Provider: Deidre Correa MD    Anesthesia Type: MAC ASA Status: 2          Anesthesia Type: MAC    Vitals  Vitals Value Taken Time   /76 11/13/20 0842   Temp     Pulse 69 11/13/20 0842   Resp 16 11/13/20 0842   SpO2 97 % 11/13/20 0842           Post Anesthesia Care and Evaluation    Patient location during evaluation: bedside  Patient participation: complete - patient participated  Level of consciousness: awake and alert  Pain management: adequate  Airway patency: patent  Anesthetic complications: No anesthetic complications  PONV Status: controlled  Cardiovascular status: acceptable  Respiratory status: acceptable  Hydration status: acceptable

## 2021-01-13 DIAGNOSIS — E78.5 HYPERLIPIDEMIA, UNSPECIFIED HYPERLIPIDEMIA TYPE: ICD-10-CM

## 2021-01-13 DIAGNOSIS — E03.9 HYPOTHYROIDISM, UNSPECIFIED TYPE: ICD-10-CM

## 2021-01-13 RX ORDER — LEVOTHYROXINE SODIUM 88 UG/1
TABLET ORAL
Qty: 30 TABLET | Refills: 0 | Status: SHIPPED | OUTPATIENT
Start: 2021-01-13 | End: 2021-02-17 | Stop reason: SDUPTHER

## 2021-01-13 RX ORDER — ROSUVASTATIN CALCIUM 5 MG/1
TABLET, COATED ORAL
Qty: 30 TABLET | Refills: 0 | Status: SHIPPED | OUTPATIENT
Start: 2021-01-13 | End: 2021-02-17 | Stop reason: SDUPTHER

## 2021-01-13 NOTE — TELEPHONE ENCOUNTER
I am covering for Peyton Washington APRN , who is off today.     Medication refilled for patient.

## 2021-01-14 DIAGNOSIS — E03.9 HYPOTHYROIDISM, UNSPECIFIED TYPE: ICD-10-CM

## 2021-01-14 DIAGNOSIS — E78.5 HYPERLIPIDEMIA, UNSPECIFIED HYPERLIPIDEMIA TYPE: ICD-10-CM

## 2021-01-14 RX ORDER — LEVOTHYROXINE SODIUM 88 UG/1
88 TABLET ORAL DAILY
Qty: 30 TABLET | Refills: 0 | OUTPATIENT
Start: 2021-01-14

## 2021-01-14 RX ORDER — ROSUVASTATIN CALCIUM 5 MG/1
5 TABLET, COATED ORAL DAILY
Qty: 30 TABLET | Refills: 0 | OUTPATIENT
Start: 2021-01-14

## 2021-01-19 ENCOUNTER — PATIENT MESSAGE (OUTPATIENT)
Dept: INTERNAL MEDICINE | Facility: CLINIC | Age: 66
End: 2021-01-19

## 2021-02-18 DIAGNOSIS — E03.9 HYPOTHYROIDISM, UNSPECIFIED TYPE: ICD-10-CM

## 2021-02-18 DIAGNOSIS — E78.5 HYPERLIPIDEMIA, UNSPECIFIED HYPERLIPIDEMIA TYPE: ICD-10-CM

## 2021-02-19 RX ORDER — LEVOTHYROXINE SODIUM 88 UG/1
88 TABLET ORAL DAILY
Qty: 90 TABLET | Refills: 1 | Status: SHIPPED | OUTPATIENT
Start: 2021-02-19 | End: 2021-08-31 | Stop reason: SDUPTHER

## 2021-02-19 RX ORDER — ROSUVASTATIN CALCIUM 5 MG/1
5 TABLET, COATED ORAL DAILY
Qty: 90 TABLET | Refills: 1 | Status: SHIPPED | OUTPATIENT
Start: 2021-02-19 | End: 2021-08-31 | Stop reason: SDUPTHER

## 2021-03-19 ENCOUNTER — BULK ORDERING (OUTPATIENT)
Dept: CASE MANAGEMENT | Facility: OTHER | Age: 66
End: 2021-03-19

## 2021-03-19 DIAGNOSIS — Z23 IMMUNIZATION DUE: ICD-10-CM

## 2021-05-21 DIAGNOSIS — E78.5 HYPERLIPIDEMIA, UNSPECIFIED HYPERLIPIDEMIA TYPE: ICD-10-CM

## 2021-05-21 DIAGNOSIS — E03.9 HYPOTHYROIDISM, UNSPECIFIED TYPE: ICD-10-CM

## 2021-05-21 RX ORDER — LEVOTHYROXINE SODIUM 88 UG/1
88 TABLET ORAL DAILY
Qty: 90 TABLET | Refills: 1 | OUTPATIENT
Start: 2021-05-21

## 2021-05-21 RX ORDER — ROSUVASTATIN CALCIUM 5 MG/1
5 TABLET, COATED ORAL DAILY
Qty: 90 TABLET | Refills: 1 | OUTPATIENT
Start: 2021-05-21

## 2021-08-31 ENCOUNTER — TELEPHONE (OUTPATIENT)
Dept: INTERNAL MEDICINE | Facility: CLINIC | Age: 66
End: 2021-08-31

## 2021-08-31 ENCOUNTER — PATIENT MESSAGE (OUTPATIENT)
Dept: INTERNAL MEDICINE | Facility: CLINIC | Age: 66
End: 2021-08-31

## 2021-08-31 DIAGNOSIS — E78.5 HYPERLIPIDEMIA, UNSPECIFIED HYPERLIPIDEMIA TYPE: ICD-10-CM

## 2021-08-31 DIAGNOSIS — E03.9 HYPOTHYROIDISM, UNSPECIFIED TYPE: ICD-10-CM

## 2021-08-31 RX ORDER — LEVOTHYROXINE SODIUM 88 UG/1
88 TABLET ORAL DAILY
Qty: 90 TABLET | Refills: 0 | Status: SHIPPED | OUTPATIENT
Start: 2021-08-31 | End: 2022-03-01 | Stop reason: SDUPTHER

## 2021-08-31 RX ORDER — ROSUVASTATIN CALCIUM 5 MG/1
5 TABLET, COATED ORAL DAILY
Qty: 90 TABLET | Refills: 1 | Status: CANCELLED | OUTPATIENT
Start: 2021-08-31

## 2021-08-31 RX ORDER — ROSUVASTATIN CALCIUM 5 MG/1
5 TABLET, COATED ORAL DAILY
Qty: 90 TABLET | Refills: 0 | Status: SHIPPED | OUTPATIENT
Start: 2021-08-31 | End: 2022-03-01 | Stop reason: SDUPTHER

## 2021-08-31 RX ORDER — LEVOTHYROXINE SODIUM 88 UG/1
88 TABLET ORAL DAILY
Qty: 90 TABLET | Refills: 1 | Status: CANCELLED | OUTPATIENT
Start: 2021-08-31

## 2021-08-31 NOTE — TELEPHONE ENCOUNTER
Caller: RomanArash    Relationship: Self    Best call back number: [phone] - PLEASE CALL PATIENT     Medication needed:   Requested Prescriptions     Pending Prescriptions Disp Refills   • levothyroxine (SYNTHROID, LEVOTHROID) 88 MCG tablet 90 tablet 1     Sig: Take 1 tablet by mouth Daily.   • rosuvastatin (CRESTOR) 5 MG tablet 90 tablet 1     Sig: Take 1 tablet by mouth Daily.       When do you need the refill by: ASAP     What additional details did the patient provide when requesting the medication: PATIENT HAS 3 LEFT AND HAS AN APPOINTMENT WITH NEW PROVENDER  11/16/2021 AND NEEDS MEDICATION TO GET HIM THROUGH .   Does the patient have less than a 3 day supply:  [x] Yes  [] No    What is the patient's preferred pharmacy: [facility] [address] [place] [address] [address] [facility] [address]  - [phone] [facility] [phone] FX

## 2021-08-31 NOTE — TELEPHONE ENCOUNTER
From: Arash Roman  To: RAYMOND Garcia  Sent: 8/31/2021 2:18 PM EDT  Subject: Prescription Question    Hi. I'm running out of prescriptions this week. Levothyroxin and Rousavastatin. My earliest appointment for new patient is set for November. Can you help please?    Please call    Laron Roman  [phone]

## 2021-09-07 ENCOUNTER — IMMUNIZATION (OUTPATIENT)
Dept: VACCINE CLINIC | Facility: HOSPITAL | Age: 66
End: 2021-09-07

## 2021-09-07 PROCEDURE — 0001A: CPT | Performed by: INTERNAL MEDICINE

## 2021-09-07 PROCEDURE — 91300 HC SARSCOV02 VAC 30MCG/0.3ML IM: CPT | Performed by: INTERNAL MEDICINE

## 2021-11-16 ENCOUNTER — OFFICE VISIT (OUTPATIENT)
Dept: INTERNAL MEDICINE | Facility: CLINIC | Age: 66
End: 2021-11-16

## 2021-11-16 VITALS
WEIGHT: 183 LBS | HEART RATE: 70 BPM | SYSTOLIC BLOOD PRESSURE: 118 MMHG | BODY MASS INDEX: 27.74 KG/M2 | TEMPERATURE: 97.1 F | DIASTOLIC BLOOD PRESSURE: 62 MMHG | HEIGHT: 68 IN

## 2021-11-16 DIAGNOSIS — E78.5 HYPERLIPIDEMIA, UNSPECIFIED HYPERLIPIDEMIA TYPE: ICD-10-CM

## 2021-11-16 DIAGNOSIS — Z23 NEED FOR PNEUMOCOCCAL VACCINATION: ICD-10-CM

## 2021-11-16 DIAGNOSIS — K64.8 INTERNAL HEMORRHOIDS: ICD-10-CM

## 2021-11-16 DIAGNOSIS — E03.9 HYPOTHYROIDISM, UNSPECIFIED TYPE: ICD-10-CM

## 2021-11-16 DIAGNOSIS — N48.6 PEYRONIE'S DISEASE: Primary | ICD-10-CM

## 2021-11-16 DIAGNOSIS — Z12.5 PROSTATE CANCER SCREENING: ICD-10-CM

## 2021-11-16 PROCEDURE — 99214 OFFICE O/P EST MOD 30 MIN: CPT | Performed by: INTERNAL MEDICINE

## 2021-11-16 PROCEDURE — G0009 ADMIN PNEUMOCOCCAL VACCINE: HCPCS | Performed by: INTERNAL MEDICINE

## 2021-11-16 PROCEDURE — 90732 PPSV23 VACC 2 YRS+ SUBQ/IM: CPT | Performed by: INTERNAL MEDICINE

## 2021-11-16 NOTE — PROGRESS NOTES
"Chief Complaint  Hyperlipidemia and Hypothyroidism    Subjective          Arash Roman presents to [facility]  History of Present Illness Here to establish- has some fatigue- not sure if thyroid related- has presented with hypothyroidism like this for year.     Weight is up- more normal weight is @ 165. Thinks pandemic related, decreased activity in general.  Wife went thru breast cancer treatment.    Has been on rosuvastatin for several years- few issues with myalgias in other statins in the past.   Dx Peyroinis disease years ago by previous MD, has never sought formal treatment- associated with some ED.  Unable to have intercourse.  Internal hemorrhoids on c-scope - occ bleeding with BM, gita if he has gotten a little constipated.      Objective   Vital Signs:   /62   Pulse 70   Temp 97.1 °F (36.2 °C)   Ht 172.7 cm (68\")   Wt 83 kg (183 lb)   BMI 27.83 kg/m²     Physical Exam  Constitutional:       Appearance: Normal appearance.   Cardiovascular:      Rate and Rhythm: Normal rate and regular rhythm.   Pulmonary:      Effort: Pulmonary effort is normal.   Musculoskeletal:      Right lower leg: No edema.      Left lower leg: No edema.   Neurological:      General: No focal deficit present.   Psychiatric:         Mood and Affect: Mood normal.         Thought Content: Thought content normal.        Result Review :   The following data was reviewed by: Layla Siddiqui MD on 11/16/2021:      Data reviewed: previous providers records          Assessment and Plan    Diagnoses and all orders for this visit:    1. Peyronie's disease (Primary)  Comments:  would like more definitive treatment options, will as urology to eval.  Orders:  -     Ambulatory Referral to Urology    2. Prostate cancer screening  Comments:  check PSA  Orders:  -     PSA Screen    3. Hypothyroidism, unspecified type  Comments:  due to TSH  Orders:  -     TSH    4. Hyperlipidemia, unspecified hyperlipidemia " type  Comments:  tolerates rosuvasatin well- check labs today  Orders:  -     Comprehensive Metabolic Panel  -     Lipid Panel With / Chol / HDL Ratio    5. Need for pneumococcal vaccination  -     Pneumococcal Polysaccharide Vaccine 23-Valent (PPSV23) Greater Than or Equal To 1yo Subcutaneous / IM    6. Internal hemorrhoids  Comments:  noted on c-scope, causing irreg bleeding- recommend fiber and water increased in diet, reduce straining.          Follow Up   Return for Lab Today.  Patient was given instructions and counseling regarding his condition or for health maintenance advice. Please see specific information pulled into the AVS if appropriate.

## 2021-11-17 LAB
ALBUMIN SERPL-MCNC: 4.6 G/DL (ref 3.8–4.8)
ALBUMIN/GLOB SERPL: 2 {RATIO} (ref 1.2–2.2)
ALP SERPL-CCNC: 69 IU/L (ref 44–121)
ALT SERPL-CCNC: 29 IU/L (ref 0–44)
AST SERPL-CCNC: 20 IU/L (ref 0–40)
BILIRUB SERPL-MCNC: 0.6 MG/DL (ref 0–1.2)
BUN SERPL-MCNC: 17 MG/DL (ref 8–27)
BUN/CREAT SERPL: 16 (ref 10–24)
CALCIUM SERPL-MCNC: 9.7 MG/DL (ref 8.6–10.2)
CHLORIDE SERPL-SCNC: 105 MMOL/L (ref 96–106)
CHOLEST SERPL-MCNC: 204 MG/DL (ref 100–199)
CHOLEST/HDLC SERPL: 4.3 RATIO (ref 0–5)
CO2 SERPL-SCNC: 22 MMOL/L (ref 20–29)
CREAT SERPL-MCNC: 1.05 MG/DL (ref 0.76–1.27)
GLOBULIN SER CALC-MCNC: 2.3 G/DL (ref 1.5–4.5)
GLUCOSE SERPL-MCNC: 94 MG/DL (ref 65–99)
HDLC SERPL-MCNC: 47 MG/DL
LDLC SERPL CALC-MCNC: 136 MG/DL (ref 0–99)
POTASSIUM SERPL-SCNC: 4.3 MMOL/L (ref 3.5–5.2)
PROT SERPL-MCNC: 6.9 G/DL (ref 6–8.5)
PSA SERPL-MCNC: 2.5 NG/ML (ref 0–4)
SODIUM SERPL-SCNC: 141 MMOL/L (ref 134–144)
TRIGL SERPL-MCNC: 117 MG/DL (ref 0–149)
TSH SERPL DL<=0.005 MIU/L-ACNC: 4.19 UIU/ML (ref 0.45–4.5)
VLDLC SERPL CALC-MCNC: 21 MG/DL (ref 5–40)

## 2021-12-08 ENCOUNTER — PATIENT ROUNDING (BHMG ONLY) (OUTPATIENT)
Dept: INTERNAL MEDICINE | Facility: CLINIC | Age: 66
End: 2021-12-08

## 2021-12-08 NOTE — PROGRESS NOTES
December 8, 2021    Hello, may I speak with Arash Roman?    My name is Soniya Coleman      I am  with [facility]  [address] [place] [id number].    Before we get started may I verify your date of birth? 1955    I am calling to officially welcome you to our practice and ask about your recent visit. Is this a good time to talk? No - left message    Tell me about your visit with us. What things went well?  n/a       We're always looking for ways to make our patients' experiences even better. Do you have recommendations on ways we may improve?  no    Overall were you satisfied with your first visit to our practice? no       I appreciate you taking the time to speak with me today. Is there anything else I can do for you? no      Thank you, and have a great day.    Left a message.

## 2022-03-01 DIAGNOSIS — E78.5 HYPERLIPIDEMIA, UNSPECIFIED HYPERLIPIDEMIA TYPE: ICD-10-CM

## 2022-03-01 DIAGNOSIS — E03.9 HYPOTHYROIDISM, UNSPECIFIED TYPE: ICD-10-CM

## 2022-03-01 RX ORDER — ROSUVASTATIN CALCIUM 5 MG/1
5 TABLET, COATED ORAL DAILY
Qty: 90 TABLET | Refills: 1 | Status: SHIPPED | OUTPATIENT
Start: 2022-03-01 | End: 2022-08-29

## 2022-03-01 RX ORDER — LEVOTHYROXINE SODIUM 88 UG/1
88 TABLET ORAL DAILY
Qty: 90 TABLET | Refills: 1 | Status: SHIPPED | OUTPATIENT
Start: 2022-03-01 | End: 2022-08-29

## 2022-03-01 NOTE — TELEPHONE ENCOUNTER
Caller: Arash Roman KATIE    Relationship: Self    Best call back number: [phone]     Requested Prescriptions:   Requested Prescriptions     Pending Prescriptions Disp Refills   • rosuvastatin (CRESTOR) 5 MG tablet 90 tablet 0     Sig: Take 1 tablet by mouth Daily.   • levothyroxine (SYNTHROID, LEVOTHROID) 88 MCG tablet 90 tablet 0     Sig: Take 1 tablet by mouth Daily.        [place] [facility] [place] [facility] [facility] [place] [address] [address] [facility] [address]  - [phone] [facility] [phone] FX     Does the patient have less than a 3 day supply:  [x] Yes  [] No    Deni Mcgovern Rep   03/01/22 11:20 EST

## 2022-05-26 ENCOUNTER — OFFICE VISIT (OUTPATIENT)
Dept: INTERNAL MEDICINE | Facility: CLINIC | Age: 67
End: 2022-05-26

## 2022-05-26 VITALS
SYSTOLIC BLOOD PRESSURE: 130 MMHG | WEIGHT: 188 LBS | BODY MASS INDEX: 28.49 KG/M2 | HEIGHT: 68 IN | DIASTOLIC BLOOD PRESSURE: 72 MMHG | HEART RATE: 74 BPM | TEMPERATURE: 97.3 F

## 2022-05-26 DIAGNOSIS — G89.29 CHRONIC MIDLINE LOW BACK PAIN WITHOUT SCIATICA: Primary | ICD-10-CM

## 2022-05-26 DIAGNOSIS — M54.50 CHRONIC MIDLINE LOW BACK PAIN WITHOUT SCIATICA: Primary | ICD-10-CM

## 2022-05-26 PROCEDURE — 99214 OFFICE O/P EST MOD 30 MIN: CPT | Performed by: INTERNAL MEDICINE

## 2022-05-26 NOTE — PROGRESS NOTES
"Chief Complaint  Back Pain and Fatigue    Subjective          Arash Roman presents to [facility] PRIMARY CARE  History of Present Illness  Has been treating low back pain for a long time with exercises- has never done any formal PT.  He is in more pain if he tried to mow the lawn, etc.  The exercises helped for many years but not as helpful.  Now, pain does not seem to be just in the lower back, can go up spine.  Radiates to R leg sometimes during the day, down back of leg. Can notice when he's driving.   Trouble getting comfortable in bed.   AMS of only a few minutes.   Trouble putting on socks/shoes with bending over.    Objective   Vital Signs:  /72   Pulse 74   Temp 97.3 °F (36.3 °C)   Ht 172.7 cm (68\")   Wt 85.3 kg (188 lb)   BMI 28.59 kg/m²         Physical Exam  Constitutional:       Appearance: Normal appearance.   Cardiovascular:      Rate and Rhythm: Normal rate and regular rhythm.   Pulmonary:      Effort: Pulmonary effort is normal.   Musculoskeletal:         General: No swelling, tenderness or signs of injury.      Right lower leg: No edema.      Left lower leg: No edema.   Skin:     General: Skin is warm and dry.        Result Review :                 Assessment and Plan    Diagnoses and all orders for this visit:    1. Chronic midline low back pain without sciatica (Primary)  Comments:  suspect related to general stiffness of muscles.  No rigidity.  Will work with PT first and reassess if fails to improve.   Orders:  -     Ambulatory Referral to Physical Therapy Evaluate and treat             Follow Up   Return in about 6 months (around 11/26/2022) for [facility], Medicare Wellness.  Patient was given instructions and counseling regarding his condition or for health maintenance advice. Please see specific information pulled into the AVS if appropriate.       "

## 2022-06-27 ENCOUNTER — HOSPITAL ENCOUNTER (OUTPATIENT)
Dept: PHYSICAL THERAPY | Facility: HOSPITAL | Age: 67
Setting detail: THERAPIES SERIES
Discharge: HOME | End: 2022-06-27

## 2022-06-27 DIAGNOSIS — M54.50 CHRONIC MIDLINE LOW BACK PAIN WITHOUT SCIATICA: Primary | ICD-10-CM

## 2022-06-27 DIAGNOSIS — M53.86 DECREASED RANGE OF MOTION OF LUMBAR SPINE: ICD-10-CM

## 2022-06-27 DIAGNOSIS — G89.29 CHRONIC MIDLINE LOW BACK PAIN WITHOUT SCIATICA: Primary | ICD-10-CM

## 2022-06-27 PROCEDURE — 97110 THERAPEUTIC EXERCISES: CPT

## 2022-06-27 PROCEDURE — 97161 PT EVAL LOW COMPLEX 20 MIN: CPT

## 2022-06-27 NOTE — THERAPY EVALUATION
Outpatient Physical Therapy Ortho Initial Evaluation  [facility]     Patient Name: Arash Roman  : 1955  MRN: [id number]  Today's Date: 2022      Visit Date: 2022    Patient Active Problem List   Diagnosis   • Hypothyroidism   • Hyperlipidemia   • Depression   • Peyronie's disease   • Adhesive capsulitis of left shoulder   • Allergic rhinitis   • Vitamin D deficiency   • Colon polyp   • Family history of colon cancer        Past Medical History:   Diagnosis Date   • Allergic rhinitis    • Basal cell carcinoma     followed by Dermatology, Dr. Lopez   • Colon polyp    • Depression    • Frozen shoulder    • Hypercholesteremia    • Hypothyroidism    • Peyronie's disease    • Vitamin D deficiency         Past Surgical History:   Procedure Laterality Date   • COLONOSCOPY N/A 2016    Procedure: COLONOSCOPY to cecum/TI; polypectomy(cold bx);  Surgeon: Juno Steven MD;  Location: [facility] ENDOSCOPY;  Service:    • COLONOSCOPY N/A 10/15/2018    Procedure: COLONOSCOPY TO CECUM/TI WITH POLYPECTOMY (COLD BX);  Surgeon: Juno Steven MD;  Location: [facility] ENDOSCOPY;  Service: Gastroenterology   • COLONOSCOPY N/A 2020    Procedure: COLONOSCOPY to cecum and TI:;  Surgeon: Juno Steven MD;  Location: [facility] ENDOSCOPY;  Service: Gastroenterology;  Laterality: N/A;  family hx colon cancer, personal hx colon polyps  post:  externa; hemorrhoids,    • TONSILLECTOMY         Visit Dx:     ICD-10-CM ICD-9-CM   1. Chronic midline low back pain without sciatica  M54.50 724.2    G89.29 338.29   2. Decreased range of motion of lumbar spine  M53.86 724.9          Patient History     Row Name 22 1000             History    Chief Complaint Pain  -MH      Type of Pain Back pain  -      Date Current Problem(s) Began --  chronic  -MH      Brief Description of Current Complaint Pt. presents to clinic with reports of chronic back pain for quite some time. Ongoing 2-3 years, worsening over the past year.  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 At that time his PCP gave him a few exercises to do in the mornings which seemed to alleviate it. Now he feels a stiffness in his back, has difficulty bending forward to put/tie shoes. He has pain at night when trying to sleep. If he does try to do any heavy lifting physically he is able but for several days following he has pain associated with it. Pt. Does report intermittent feeling of tingling down the R leg on the outside and down towards the knee, he will notice this when sitting in the car for awhile (30-60 minutes).  -      Previous treatment for THIS PROBLEM Other (comment)  exercises  -      Occupation/sports/leisure activities enjoys [employer], works from home - pt. is a   -      Patient seeing anyone else for problem(s)? PCP  -              Pain     Pain Location Back  -      Pain at Present 4  -      Pain at Worst 6  when walking out doorway if he bumps his shoulder it will send tingling down his back  -      What Performance Factors Make the Current Problem(s) WORSE? sitting for a long period of time, trying to go to sleep  -      What Performance Factors Make the Current Problem(s) BETTER? no known relieving factors  -      Tolerance Time- Sitting 30-60 minutes  -      Difficulties with ADL's? yes  -      Difficulties with recreational activities? yes  -              Fall Risk Assessment    Any falls in the past year: No  -              Services    Prior Rehab/Home Health Experiences No  -              Daily Activities    Primary Language English  -      How does patient learn best? Listening;Reading;Demonstration  -      Barriers to learning None  -      Pt Participated in POC and Goals Yes  -            User Key  (r) = Recorded By, (t) = Taken By, (c) = Cosigned By    Initials Name Provider Type      Kelsey Hurley, PT Physical Therapist                 PT Ortho     Row Name 06/27/22 1000       Posture/Observations    Alignment Options Iliac crests;Lumbar lordosis  -    Lumbar lordosis Mild;Decreased  -    Iliac crests Normal  -       Quarter Clearing    Quarter Clearing Lower Quarter Clearing  -       Neural Tension Signs- Lower Quarter Clearing    SLR Bilateral:;Negative  -       Myotomal Screen- Lower Quarter Clearing    Hip flexion (L2) Bilateral:;5 (Normal)  -    Knee extension (L3) Bilateral:;5 (Normal)  -    Ankle DF (L4) Bilateral:;5 (Normal)  -       Lumbar ROM Screen- Lower Quarter Clearing    Lumbar Flexion Impaired  50% full, c/o tension through hamstring  -    Lumbar Extension Impaired  50% WFL  -    Lumbar Lateral Flexion Impaired  50% full just above kne  -    Lumbar Rotation Normal  -       SI/Hip Screen- Lower Quarter Clearing    Trinity's/Bala's test Bilateral:;Positive  -       Special Tests/Palpation    Special Tests/Palpation Lumbar/SI  -       Lumbosacral Accessory Motions    Lumbosacral Accessory Motions Tested? Yes  -    PA [place]- L1 Center:  -    PA [place]- L2 Center:  -    PA [place]- L3 Center:;Hypomobile  -    PA [place]- L4 Center:;Hypomobile  -    PA [place]- L5 Center:;Hypomobile  -    PA gl[place]- Sacral base Center:;Hypomobile  -       Lumbosacral Palpation    Lumbosacral Palpation? Yes  -    Gluteus Yonathan Tender  -    Quadratus Lumborum Guarded/taut  -    Erector Spinae (Paraspinals) Guarded/taut;Tender  -       Hip Special Tests    Ely’s test (rectus femoris tightness) Bilateral:;Positive  -       General ROM    GENERAL ROM COMMENTS limmited hip flexion bilaterally likely related to soft tissue restriction  -       MMT (Manual Muscle Testing)    Rt Lower Ext Rt Hip ABduction;Rt Hip Extension;Rt Knee Flexion  -    Lt Lower Ext Lt Hip Extension;Lt Hip ABduction;Lt Knee Flexion  -       MMT Right Lower Ext    Rt Hip  Extension MMT, Gross Movement (3+/5) fair plus  -MH    Rt Hip ABduction MMT, Gross Movement (4-/5) good minus  -MH    Rt Knee Flexion MMT, Gross Movement (4-/5) good minus  -MH       MMT Left Lower Ext    Lt Hip Extension MMT, Gross Movement (3+/5) fair plus  -MH    Lt Hip ABduction MMT, Gross Movement (4-/5) good minus  -MH    Lt Knee Flexion MMT, Gross Movement (4-/5) good minus  -MH       Sensation    Sensation WNL? WNL  -MH       Flexibility    Flexibility Tested? Lower Extremity  -MH       Lower Extremity Flexibility    Hamstrings Bilateral:;Moderately limited  -MH          User Key  (r) = Recorded By, (t) = Taken By, (c) = Cosigned By    Initials Name Provider Type    Kelsey Gray, PT Physical Therapist                            Therapy Education  Education Details: Educated on PT role and POC; discussed expectations/timeframe for healing. Plan to address mobility restrictions and progress toward core/hip girdle strengthening for improved lumbar support. Provided HEP, Access Code: [email or web address]  Given: HEP, Symptoms/condition management, Posture/body mechanics  Program: New  How Provided: Verbal, Demonstration, Written  Provided to: Patient  Level of Understanding: Teach back education performed, Verbalized, Demonstrated      PT OP Goals     Row Name 06/27/22 1000          PT Short Term Goals    STG Date to Achieve 07/27/22  -     STG 1 Pt. Will be independent with initial HEP to improve self-management of condition.  -     STG 1 Progress New  [facility]     STG 2 Pt. Will demonstrate proper log roll technique without cueing to reduce lumbar strain and preserve spine.  -     STG 2 Progress New  [facility]     STG 3 Pt. will be compliant with use of lumbar support when traveling in car for long distances to improve postural alignment and reduce LBP.  -     STG 3 Progress New  [facility]            Long Term Goals    LTG Date to Achieve 08/26/22  -     LTG 1 Pt. Will be independent with advanced HEP to improve long-term  management of condition and independence.  -     LTG 1 Progress New  -     LTG 2 Pt. Will demonstrate proper transition from kneel<>stand to improve ability to participate in hobbies of photography.  -     LTG 2 Progress New  -     LTG 3 Pt. will improve B hip girdle strength >/= 4/5 to improve lumbopelvic stability.  -     LTG 3 Progress New  -     LTG 4 Pt. Will score </= 25% on Modified Oswestry  (from 48% on initial evaluation) to indicate improved perception of disability.  -     LTG 4 Progress New  -            Time Calculation    PT Goal Re-Cert Due Date 09/25/22  -           User Key  (r) = Recorded By, (t) = Taken By, (c) = Cosigned By    Initials Name Provider Type     Kelsey Hurley, PT Physical Therapist                 PT Assessment/Plan     Row Name 06/27/22 1050          PT Assessment    Functional Limitations Impaired locomotion;Performance in leisure activities;Performance in sport activities  -     Impairments Gait;Impaired flexibility;Locomotion;Muscle strength;Pain;Poor body mechanics;Posture;Range of motion;Joint mobility  -     Assessment Comments Arash Roman is a 66 y.o. year-old male referred to physical therapy for chronic LBP. He presents with a stable clinical presentation. He has no pertinent comorbidities and personal factors of chronicity of symptoms, works from home as  and enjoys photography that may affect his progress in the plan of care. Self scored disability measure of Modified Oswestry was a 48% (0 is no disability). He demonstrated decreased lumbar ROM particularly into flexion, decreased LE flexibility bilaterally, decrease B hip girdle strength, and hypomobility through lumbar spine with PIVMs. Signs and symptoms are consistent with referring diagnosis. He is appropriate for skilled therapy services at this time to address deficits and improve ease with ADLs and hobbies.  -     Please refer to paper survey for additional self-reported  information Yes  -MH     Rehab Potential Fair  -     Patient/caregiver participated in establishment of treatment plan and goals Yes  -     Patient would benefit from skilled therapy intervention Yes  -            PT Plan    PT Frequency 2x/week;1x/week  -     Predicted Duration of Therapy Intervention (PT) 12 visits  -     Planned CPT's? PT EVAL LOW COMPLEXITY: 98314;PT RE-EVAL: 78835;PT THER PROC EA 15 MIN: 79301;PT MANUAL THERAPY EA 15 MIN: 79857;PT THER ACT EA 15 MIN: 92065;PT NEUROMUSC RE-EDUCATION EA 15 MIN: 60351;PT GAIT TRAINING EA 15 MIN: 14166;PT ELECTRICAL STIM UNATTEND: ;PT HOT OR COLD PACK TREAT [facility];PT ELECTRICAL STIM ATTD EA 15 MIN: 05648;PT ULTRASOUND EA 15 MIN: 97285;PT TRACTION LUMBAR: 02037  -     PT Plan Comments warm up NuStep, add quad and hip flexor stretch, S/L open book, seated swiss ball roll out  -           User Key  (r) = Recorded By, (t) = Taken By, (c) = Cosigned By    Initials Name Provider Type     Kelsey Hurley, PT Physical Therapist                   OP Exercises     Row Name 06/27/22 1100             Total Minutes    85255 - PT Therapeutic Exercise Minutes 9  -MH              Exercise 1    Exercise Name 1 NuStep  -      Additional Comments next visit  -MH              Exercise 2    Exercise Name 2 LTR  -MH      Cueing 2 Verbal;Demo  -MH      Reps 2 10e  -MH      Time 2 5sec  -MH              Exercise 3    Exercise Name 3 SKTC - with towel  -MH      Cueing 3 Verbal;Demo  -MH      Reps 3 3e  -MH      Time 3 20sec  -MH              Exercise 4    Exercise Name 4 HS stretch - seated EOB  -MH      Cueing 4 Verbal;Demo  -MH      Reps 4 3  -MH      Time 4 20sec  -MH            User Key  (r) = Recorded By, (t) = Taken By, (c) = Cosigned By    Initials Name Provider Type     Kelsey Hurley, PT Physical Therapist                              Outcome Measure Options: Modified Oswestry  Modified Oswestry  Modified Oswestry Score/Comments: 24/50 48%      Time  Calculation:     Start Time: 1049  Stop Time: 1127  Time Calculation (min): 38 min  Total Timed Code Minutes- PT: 9 minute(s)  Timed Charges  54703 - PT Therapeutic Exercise Minutes: 9  Untimed Charges  PT Eval/Re-eval Minutes: 29  Total Minutes  Timed Charges Total Minutes: 9  Untimed Charges Total Minutes: 29   Total Minutes: 38     Therapy Charges for Today     Code Description Service Date Service Provider Modifiers Qty    76345201116 HC PT THER PROC EA 15 MIN 6/27/2022 Kelsey Hurley, PT GP 1    79950871131 HC PT EVAL LOW COMPLEXITY 2 6/27/2022 Kelsey Hurley, PT GP 1          PT G-Codes  Outcome Measure Options: Modified Oswestry  Modified Oswestry Score/Comments: 24/50 48%         Kelsey Hurley PT  6/27/2022

## 2022-07-12 ENCOUNTER — HOSPITAL ENCOUNTER (OUTPATIENT)
Dept: PHYSICAL THERAPY | Facility: HOSPITAL | Age: 67
Setting detail: THERAPIES SERIES
Discharge: HOME | End: 2022-07-12

## 2022-07-12 DIAGNOSIS — G89.29 CHRONIC MIDLINE LOW BACK PAIN WITHOUT SCIATICA: Primary | ICD-10-CM

## 2022-07-12 DIAGNOSIS — M53.86 DECREASED RANGE OF MOTION OF LUMBAR SPINE: ICD-10-CM

## 2022-07-12 DIAGNOSIS — M54.50 CHRONIC MIDLINE LOW BACK PAIN WITHOUT SCIATICA: Primary | ICD-10-CM

## 2022-07-12 PROCEDURE — 97110 THERAPEUTIC EXERCISES: CPT

## 2022-07-12 NOTE — THERAPY TREATMENT NOTE
Outpatient Physical Therapy Ortho Treatment Note  [facility]     Patient Name: Arash Roman  : 1955  MRN: [id number]  Today's Date: 2022      Visit Date: 2022    Visit Dx:    ICD-10-CM ICD-9-CM   1. Chronic midline low back pain without sciatica  M54.50 724.2    G89.29 338.29   2. Decreased range of motion of lumbar spine  M53.86 724.9       Patient Active Problem List   Diagnosis   • Hypothyroidism   • Hyperlipidemia   • Depression   • Peyronie's disease   • Adhesive capsulitis of left shoulder   • Allergic rhinitis   • Vitamin D deficiency   • Colon polyp   • Family history of colon cancer        Past Medical History:   Diagnosis Date   • Allergic rhinitis    • Basal cell carcinoma     followed by Dermatology, Dr. Lopez   • Colon polyp    • Depression    • Frozen shoulder    • Hypercholesteremia    • Hypothyroidism    • Peyronie's disease    • Vitamin D deficiency         Past Surgical History:   Procedure Laterality Date   • COLONOSCOPY N/A 2016    Procedure: COLONOSCOPY to cecum/TI; polypectomy(cold bx);  Surgeon: Juno Steven MD;  [facility];  Service:    • COLONOSCOPY N/A 10/15/2018    Procedure: COLONOSCOPY TO CECUM/TI WITH POLYPECTOMY (COLD BX);  Surgeon: Juno Steven MD;  Location: [facility] ENDOSCOPY;  Service: Gastroenterology   • COLONOSCOPY N/A 2020    Procedure: COLONOSCOPY to cecum and TI:;  Surgeon: Juno Steven MD;  Location: [facility] ENDOSCOPY;  Service: Gastroenterology;  Laterality: N/A;  family hx colon cancer, personal hx colon polyps  post:  externa; hemorrhoids,    • TONSILLECTOMY                          PT Assessment/Plan     Row Name 22 1300          PT Assessment    Assessment Comments Mr. Roman returns for first follow up from initial evaluation, arrives 13:51 for 13:45 appointment but unable to begin before 13:55 as pt.in bathroom to start session. Focused session on review of initial HEP to improve carryover and compliance  and progressed lumbar mobility and gentle core strengthening without complaint. Pt. will continue to benefit from skilled PT.  -MH            PT Plan    PT Plan Comments S/L clam, hip flexor stretch, core strengthening  -MH           User Key  (r) = Recorded By, (t) = Taken By, (c) = Cosigned By    Initials Name Provider Type     Kelsey Hurley, PT Physical Therapist                   OP Exercises     Row Name 07/12/22 1300             Subjective Comments    Subjective Comments Not a whole lot different , but I have to give myself about a D on doing the exercises. I just havent gotten into a routine with them but the days I do I notice it does feel better.  -MH              Total Minutes    75368 - PT Therapeutic Exercise Minutes 30  -MH              Exercise 1    Exercise Name 1 NuStep  -MH      Cueing 1 Verbal  -MH      Time 1 5 min  -MH      Additional Comments B UE/LE  -MH              Exercise 2    Exercise Name 2 LTR  -MH      Cueing 2 Verbal;Demo  -MH      Reps 2 10e  -MH      Time 2 5sec  -MH              Exercise 3    Exercise Name 3 SKTC - with towel  -MH      Cueing 3 Verbal;Demo  -MH      Reps 3 3e  -MH      Time 3 20sec  -MH              Exercise 4    Exercise Name 4 HS stretch - seated EOB  -MH      Cueing 4 Verbal;Demo  -MH      Reps 4 3  -MH      Time 4 20sec  -MH              Exercise 5    Exercise Name 5 S/L T/L rotation  -MH      Cueing 5 Verbal;Demo  -MH      Reps 5 10e  -MH      Time 5 5sec  -MH      Additional Comments bottom leg straight, top leg hooked with bottom hand on thigh  -MH              Exercise 6    Exercise Name 6 H/L PPT  -MH      Cueing 6 Verbal;Demo  -MH      Reps 6 10  -MH      Time 6 5sec  -MH              Exercise 7    Exercise Name 7 prone quad stretch w/ strap  -MH      Cueing 7 Verbal;Demo  -MH      Reps 7 3e  -MH      Time 7 20sec  -MH            User Key  (r) = Recorded By, (t) = Taken By, (c) = Cosigned By    Initials Name Provider Type    Kelsey Gray, PT  Physical Therapist                              PT OP Goals     Row Name 07/12/22 1300          PT Short Term Goals    STG Date to Achieve 07/27/22  -     STG 1 Pt. Will be independent with initial HEP to improve self-management of condition.  -     STG 1 Progress Ongoing  -     STG 2 Pt. Will demonstrate proper log roll technique without cueing to reduce lumbar strain and preserve spine.  -     STG 2 Progress Ongoing  -     STG 3 Pt. will be compliant with use of lumbar support when traveling in car for long distances to improve postural alignment and reduce LBP.  -     STG 3 Progress Ongoing  -            Long Term Goals    LTG Date to Achieve 08/26/22  -     LTG 1 Pt. Will be independent with advanced HEP to improve long-term management of condition and independence.  -     LTG 1 Progress Ongoing  -     LTG 2 Pt. Will demonstrate proper transition from kneel<>stand to improve ability to participate in hobbies of photography.  -     LTG 2 Progress Ongoing  -     LTG 3 Pt. will improve B hip girdle strength >/= 4/5 to improve lumbopelvic stability.  -     LTG 3 Progress Ongoing  -     LTG 4 Pt. Will score </= 25% on Modified Oswestry  (from 48% on initial evaluation) to indicate improved perception of disability.  -     LTG 4 Progress Ongoing  [facility]           User Key  (r) = Recorded By, (t) = Taken By, (c) = Cosigned By    Initials Name Provider Type     Kelsey Hurley, PT Physical Therapist                               Time Calculation:   Start Time: 1355  Stop Time: 1425  Time Calculation (min): 30 min  Total Timed Code Minutes- PT: 30 minute(s)  Timed Charges  32828 - PT Therapeutic Exercise Minutes: 30  Total Minutes  Timed Charges Total Minutes: 30   Total Minutes: 30  Therapy Charges for Today     Code Description Service Date Service Provider Modifiers Qty    77656325500 HC PT THER PROC EA 15 MIN 7/12/2022 Kelsey Hurley, PT GP 2                    Kelsey Hurley  PT  7/12/2022

## 2022-07-19 ENCOUNTER — HOSPITAL ENCOUNTER (OUTPATIENT)
Dept: PHYSICAL THERAPY | Facility: HOSPITAL | Age: 67
Setting detail: THERAPIES SERIES
Discharge: HOME | End: 2022-07-19

## 2022-07-19 DIAGNOSIS — M54.50 CHRONIC MIDLINE LOW BACK PAIN WITHOUT SCIATICA: Primary | ICD-10-CM

## 2022-07-19 DIAGNOSIS — M53.86 DECREASED RANGE OF MOTION OF LUMBAR SPINE: ICD-10-CM

## 2022-07-19 DIAGNOSIS — G89.29 CHRONIC MIDLINE LOW BACK PAIN WITHOUT SCIATICA: Primary | ICD-10-CM

## 2022-07-19 PROCEDURE — 97110 THERAPEUTIC EXERCISES: CPT

## 2022-07-19 NOTE — THERAPY TREATMENT NOTE
Outpatient Physical Therapy Ortho Treatment Note  [facility]     Patient Name: Arash Roman  : 1955  MRN: [id number]  Today's Date: 2022      Visit Date: 2022    Visit Dx:    ICD-10-CM ICD-9-CM   1. Chronic midline low back pain without sciatica  M54.50 724.2    G89.29 338.29   2. Decreased range of motion of lumbar spine  M53.86 724.9       Patient Active Problem List   Diagnosis   • Hypothyroidism   • Hyperlipidemia   • Depression   • Peyronie's disease   • Adhesive capsulitis of left shoulder   • Allergic rhinitis   • Vitamin D deficiency   • Colon polyp   • Family history of colon cancer        Past Medical History:   Diagnosis Date   • Allergic rhinitis    • Basal cell carcinoma     followed by Dermatology, Dr. Lopez   • Colon polyp    • Depression    • Frozen shoulder    • Hypercholesteremia    • Hypothyroidism    • Peyronie's disease    • Vitamin D deficiency         Past Surgical History:   Procedure Laterality Date   • COLONOSCOPY N/A 2016    Procedure: COLONOSCOPY to cecum/TI; polypectomy(cold bx);  Surgeon: Juno Steven MD;  Location: [facility] ENDOSCOPY;  Service:    • COLONOSCOPY N/A 10/15/2018    Procedure: COLONOSCOPY TO CECUM/TI WITH POLYPECTOMY (COLD BX);  Surgeon: Juno Steven MD;  Location: [facility] ENDOSCOPY;  Service: Gastroenterology   • COLONOSCOPY N/A 2020    Procedure: COLONOSCOPY to cecum and TI:;  Surgeon: Juno Steven MD;  Location: [facility] ENDOSCOPY;  Service: Gastroenterology;  Laterality: N/A;  family hx colon cancer, personal hx colon polyps  post:  externa; hemorrhoids,    • TONSILLECTOMY                          PT Assessment/Plan     Row Name 22 1600          PT Assessment    Assessment Comments Pt returns with improved HEP compliance, does report temporary relief when performing, but overall no lasting changes noted with back pain. Progressed mobility and hip/core strengthening interventions and updated HEP to reflect.  -CC            PT  Plan    PT Plan Comments HF str at step, side steps  -CC           User Key  (r) = Recorded By, (t) = Taken By, (c) = Cosigned By    Initials Name Provider Type    Darlin Ortega, PT Physical Therapist                   OP Exercises     Row Name 07/19/22 1400             Subjective Comments    Subjective Comments Reports he has improved HEP compliance, notices pain relief after performing stretches in morning. Overall no changes in back he reports.  -CC              Subjective Pain    Able to rate subjective pain? yes  -CC      Pre-Treatment Pain Level 1  -CC              Total Minutes    58919 - PT Therapeutic Exercise Minutes 39  -CC              Exercise 1    Exercise Name 1 NuStep  -CC      Cueing 1 Verbal  -CC      Time 1 5 min  -CC      Additional Comments B UE/LE  -CC              Exercise 2    Exercise Name 2 LTR  -CC      Cueing 2 Verbal;Demo  -CC      Reps 2 10e  -CC      Time 2 5sec  -CC              Exercise 3    Exercise Name 3 SKTC  -CC      Cueing 3 Verbal;Demo  -CC      Reps 3 3e  -CC      Time 3 20sec  -CC              Exercise 4    Exercise Name 4 HS stretch - seated EOB  -CC      Cueing 4 Verbal;Demo  -CC      Reps 4 3  -CC      Time 4 20sec  -CC              Exercise 5    Exercise Name 5 S/L T/L rotation  -CC      Cueing 5 Verbal;Demo  -CC      Reps 5 10e  -CC      Time 5 5sec  -CC      Additional Comments bottom leg straight, top leg hooked with bottom hand on thigh  -CC              Exercise 6    Exercise Name 6 H/L PPT with march  -CC      Cueing 6 Verbal;Demo  -CC      Sets 6 2  -CC      Reps 6 10  -CC              Exercise 7    Exercise Name 7 prone quad stretch w/ strap  -CC      Cueing 7 Verbal;Demo  -CC      Reps 7 3e  -CC      Time 7 20sec  -CC              Exercise 8    Exercise Name 8 SB roll out  -CC      Cueing 8 Verbal  -CC      Sets 8 fwd and lat  -CC      Reps 8 5 ea way  -CC      Time 8 5 sec  -CC              Exercise 9    Exercise Name 9 s/l clam  -CC      Cueing 9  Verbal;Tactile  -CC      Sets 9 2 salvador  -CC      Reps 9 10  -CC      Time 9 RTB  -CC              Exercise 10    Exercise Name 10 bridge w/ TB  -CC      Cueing 10 Verbal  -CC      Sets 10 2  -CC      Reps 10 10  -CC      Time 10 cues to start w/ PPT  -CC            User Key  (r) = Recorded By, (t) = Taken By, (c) = Cosigned By    Initials Name Provider Type    CC Darlin Menjivar, PT Physical Therapist                                                Time Calculation:   Start Time: 1359  Stop Time: 1438  Time Calculation (min): 39 min  Total Timed Code Minutes- PT: 39 minute(s)  Timed Charges  09195 - PT Therapeutic Exercise Minutes: 39  Total Minutes  Timed Charges Total Minutes: 39   Total Minutes: 39  Therapy Charges for Today     Code Description Service Date Service Provider Modifiers Qty    08980029498 HC PT THER PROC EA 15 MIN 7/19/2022 Darlin Menjivar, PT GP 3                    Darlin Menjivar, PT  7/19/2022

## 2022-07-21 ENCOUNTER — HOSPITAL ENCOUNTER (OUTPATIENT)
Dept: PHYSICAL THERAPY | Facility: HOSPITAL | Age: 67
Setting detail: THERAPIES SERIES
Discharge: HOME | End: 2022-07-21

## 2022-07-21 DIAGNOSIS — M54.50 CHRONIC MIDLINE LOW BACK PAIN WITHOUT SCIATICA: Primary | ICD-10-CM

## 2022-07-21 DIAGNOSIS — G89.29 CHRONIC MIDLINE LOW BACK PAIN WITHOUT SCIATICA: Primary | ICD-10-CM

## 2022-07-21 DIAGNOSIS — M53.86 DECREASED RANGE OF MOTION OF LUMBAR SPINE: ICD-10-CM

## 2022-07-21 PROCEDURE — 97110 THERAPEUTIC EXERCISES: CPT

## 2022-07-21 NOTE — THERAPY TREATMENT NOTE
Outpatient Physical Therapy Ortho Treatment Note  [facility]     Patient Name: Arash Roman  : 1955  MRN: [id number]  Today's Date: 2022      Visit Date: 2022    Visit Dx:    ICD-10-CM ICD-9-CM   1. Chronic midline low back pain without sciatica  M54.50 724.2    G89.29 338.29   2. Decreased range of motion of lumbar spine  M53.86 724.9       Patient Active Problem List   Diagnosis   • Hypothyroidism   • Hyperlipidemia   • Depression   • Peyronie's disease   • Adhesive capsulitis of left shoulder   • Allergic rhinitis   • Vitamin D deficiency   • Colon polyp   • Family history of colon cancer        Past Medical History:   Diagnosis Date   • Allergic rhinitis    • Basal cell carcinoma     followed by Dermatology, Dr. Lopez   • Colon polyp    • Depression    • Frozen shoulder    • Hypercholesteremia    • Hypothyroidism    • Peyronie's disease    • Vitamin D deficiency         Past Surgical History:   Procedure Laterality Date   • COLONOSCOPY N/A 2016    Procedure: COLONOSCOPY to cecum/TI; polypectomy(cold bx);  Surgeon: Juno Steven MD;  Location: [facility] ENDOSCOPY;  Service:    • COLONOSCOPY N/A 10/15/2018    Procedure: COLONOSCOPY TO CECUM/TI WITH POLYPECTOMY (COLD BX);  Surgeon: Juno Steven MD;  Location: [facility] ENDOSCOPY;  Service: Gastroenterology   • COLONOSCOPY N/A 2020    Procedure: COLONOSCOPY to cecum and TI:;  Surgeon: Juno Steven MD;  Location: [facility] ENDOSCOPY;  Service: Gastroenterology;  Laterality: N/A;  family hx colon cancer, personal hx colon polyps  post:  externa; hemorrhoids,    • TONSILLECTOMY                          PT Assessment/Plan     Row Name 22 1400          PT Assessment    Assessment Comments Mr. Roman returns to clinic, reports overall improvement in pain, continues to feel limitations in mobility/flexibility. Added thien pose and QL stretch to reduce muscular tension in low back and updated to HEP. Pt. tolerated session well, will  continue to benefit from skilled PT.  -MH            PT Plan    PT Plan Comments HF stretch at step  -MH           User Key  (r) = Recorded By, (t) = Taken By, (c) = Cosigned By    Initials Name Provider Type     Kelsey Hurley, PT Physical Therapist                   OP Exercises     Row Name 07/21/22 1300             Subjective Comments    Subjective Comments I think the pain has improved, I havent noticed a big change in being more limber yet. Would it make sense to do the stretches x2 on days im not here?  -MH              Total Minutes    65706 - PT Therapeutic Exercise Minutes 38  -MH              Exercise 1    Exercise Name 1 NuStep  -MH      Cueing 1 Verbal  -MH      Time 1 5 min  -MH      Additional Comments B UE/LE  -MH              Exercise 2    Exercise Name 2 LTR  -MH      Cueing 2 Verbal;Demo  -MH      Reps 2 10e  -MH      Time 2 5sec  -MH              Exercise 5    Exercise Name 5 S/L T/L rotation  -MH      Cueing 5 Verbal;Demo  -MH      Reps 5 10e  -MH      Time 5 5sec  -MH              Exercise 7    Exercise Name 7 prone quad stretch w/ strap  -MH      Cueing 7 Verbal;Demo  -MH      Reps 7 3e  -MH      Time 7 20sec  -MH              Exercise 8    Exercise Name 8 SB roll out  -MH      Cueing 8 Verbal  -MH      Sets 8 fwd and lat  -MH      Reps 8 8 ea way  -MH      Time 8 5 sec  -MH              Exercise 9    Exercise Name 9 s/l clam  -MH      Cueing 9 Verbal;Tactile  -MH      Sets 9 2 salvador  -MH      Reps 9 10  -MH      Time 9 RTB  -MH              Exercise 10    Exercise Name 10 bridge w/ TB  -MH      Cueing 10 Verbal  -MH      Sets 10 2  -MH      Reps 10 10  -MH      Time 10 cues to start w/ PPT  -MH      Additional Comments RTB  -MH              Exercise 11    Exercise Name 11 thien pose  -MH      Cueing 11 Verbal;Demo  -MH      Reps 11 3  -MH      Time 11 20sec  -MH              Exercise 12    Exercise Name 12 QL stretch  -MH      Cueing 12 Verbal;Demo  -MH      Reps 12 2e  -MH      Time 12  20sec  -            User Key  (r) = Recorded By, (t) = Taken By, (c) = Cosigned By    Initials Name Provider Type    Kelsey Gray PT Physical Therapist                              PT OP Goals     Row Name 07/21/22 1400          PT Short Term Goals    STG Date to Achieve 07/27/22  -     STG 1 Pt. Will be independent with initial HEP to improve self-management of condition.  -     STG 1 Progress Ongoing  -     STG 2 Pt. Will demonstrate proper log roll technique without cueing to reduce lumbar strain and preserve spine.  -     STG 2 Progress Ongoing  -     STG 3 Pt. will be compliant with use of lumbar support when traveling in car for long distances to improve postural alignment and reduce LBP.  -     STG 3 Progress Ongoing  -            Long Term Goals    LTG Date to Achieve 08/26/22  -     LTG 1 Pt. Will be independent with advanced HEP to improve long-term management of condition and independence.  -     LTG 1 Progress Ongoing  -     LTG 2 Pt. Will demonstrate proper transition from kneel<>stand to improve ability to participate in hobbies of photography.  -     LTG 2 Progress Ongoing  -     LTG 3 Pt. will improve B hip girdle strength >/= 4/5 to improve lumbopelvic stability.  -     LTG 3 Progress Ongoing  -     LTG 4 Pt. Will score </= 25% on Modified Oswestry  (from 48% on initial evaluation) to indicate improved perception of disability.  -     LTG 4 Progress Ongoing  -           User Key  (r) = Recorded By, (t) = Taken By, (c) = Cosigned By    Initials Name Provider Type    Kelsey Gray PT Physical Therapist                Therapy Education  Education Details: Updated HEP Access Code: [email or web address]  Given: HEP, Symptoms/condition management, Posture/body mechanics  Program: Reinforced, Progressed  How Provided: Verbal, Demonstration, Written  Provided to: Patient  Level of Understanding: Verbalized, Demonstrated              Time Calculation:   Start Time:  1345  Stop Time: 1423  Time Calculation (min): 38 min  Total Timed Code Minutes- PT: 38 minute(s)  Timed Charges  02005 - PT Therapeutic Exercise Minutes: 38  Total Minutes  Timed Charges Total Minutes: 38   Total Minutes: 38  Therapy Charges for Today     Code Description Service Date Service Provider Modifiers Qty    18441503373 HC PT THER PROC EA 15 MIN 7/21/2022 Kelsey Hurley, PT GP 3                    Kelsey Hurley, PT  7/21/2022

## 2022-07-26 ENCOUNTER — HOSPITAL ENCOUNTER (OUTPATIENT)
Dept: PHYSICAL THERAPY | Facility: HOSPITAL | Age: 67
Setting detail: THERAPIES SERIES
Discharge: HOME | End: 2022-07-26

## 2022-07-26 DIAGNOSIS — M54.50 CHRONIC MIDLINE LOW BACK PAIN WITHOUT SCIATICA: Primary | ICD-10-CM

## 2022-07-26 DIAGNOSIS — M53.86 DECREASED RANGE OF MOTION OF LUMBAR SPINE: ICD-10-CM

## 2022-07-26 DIAGNOSIS — G89.29 CHRONIC MIDLINE LOW BACK PAIN WITHOUT SCIATICA: Primary | ICD-10-CM

## 2022-07-26 PROCEDURE — 97110 THERAPEUTIC EXERCISES: CPT

## 2022-07-26 NOTE — THERAPY PROGRESS REPORT/RE-CERT
Outpatient Physical Therapy Ortho Progress Note  [facility]     Patient Name: Arash Roman  : 1955  MRN: [id number]  Today's Date: 2022      Visit Date: 2022    Visit Dx:    ICD-10-CM ICD-9-CM   1. Chronic midline low back pain without sciatica  M54.50 724.2    G89.29 338.29   2. Decreased range of motion of lumbar spine  M53.86 724.9       Patient Active Problem List   Diagnosis   • Hypothyroidism   • Hyperlipidemia   • Depression   • Peyronie's disease   • Adhesive capsulitis of left shoulder   • Allergic rhinitis   • Vitamin D deficiency   • Colon polyp   • Family history of colon cancer        Past Medical History:   Diagnosis Date   • Allergic rhinitis    • Basal cell carcinoma     followed by Dermatology, Dr. Lopez   • Colon polyp    • Depression    • Frozen shoulder    • Hypercholesteremia    • Hypothyroidism    • Peyronie's disease    • Vitamin D deficiency         Past Surgical History:   Procedure Laterality Date   • COLONOSCOPY N/A 2016    Procedure: COLONOSCOPY to cecum/TI; polypectomy(cold bx);  Surgeon: Juno Steven MD;  Location: [facility] ENDOSCOPY;  Service:    • COLONOSCOPY N/A 10/15/2018    Procedure: COLONOSCOPY TO CECUM/TI WITH POLYPECTOMY (COLD BX);  Surgeon: Juno Steven MD;  Location: [facility] ENDOSCOPY;  Service: Gastroenterology   • COLONOSCOPY N/A 2020    Procedure: COLONOSCOPY to cecum and TI:;  Surgeon: Jnuo tSeven MD;  Location: [facility] ENDOSCOPY;  Service: Gastroenterology;  Laterality: N/A;  family hx colon cancer, personal hx colon polyps  post:  externa; hemorrhoids,    • TONSILLECTOMY          PT Ortho     Row Name 22 1400       MMT Right Lower Ext    Rt Hip Extension MMT, Gross Movement (4-/5) good minus  -CC    Rt Hip ABduction MMT, Gross Movement (4+/5) good plus  -CC       MMT Left Lower Ext    Lt Hip Extension MMT, Gross Movement (4-/5) good minus  -CC    Lt Hip ABduction MMT, Gross Movement (4/5) good  -CC          User Key  (r) =  Recorded By, (t) = Taken By, (c) = Cosigned By    Initials Name Provider Type    CC Darlin Menjivar, PT Physical Therapist                             PT Assessment/Plan     Row Name 07/26/22 1400          PT Assessment    Functional Limitations Impaired locomotion;Performance in leisure activities;Performance in sport activities  -CC     Impairments Gait;Impaired flexibility;Locomotion;Muscle strength;Pain;Poor body mechanics;Posture;Range of motion;Joint mobility  -CC     Assessment Comments Arash Roman has been seen for 5 physical therapy sessions for low back pain.  Treatment has included therapeutic exercise and patient education with home exercise program . Progress to physical therapy goals is good, although slower. Pt has met 2/3 STG and partially met 1/4 LTG, although making good progress towards all remaining goals. Pt continues to demo mild hip abd strength deficits, as well as ongoing hip ext strength deficits, and difficulty with transitional movements that require bending over. Pt reports ongoing difficulty with activities that require bending and lifting. He will benefit from continued skilled physical therapy to address remaining impairments and functional limitations.  -CC     Please refer to paper survey for additional self-reported information Yes  -CC     Rehab Potential Fair  -CC     Patient/caregiver participated in establishment of treatment plan and goals Yes  -CC     Patient would benefit from skilled therapy intervention Yes  -CC            PT Plan    PT Frequency 2x/week;1x/week  -CC     Predicted Duration of Therapy Intervention (PT) 3-6 more visits  -CC     PT Plan Comments next visit: AR press, squat  -CC           User Key  (r) = Recorded By, (t) = Taken By, (c) = Cosigned By    Initials Name Provider Type    CC Darlin Menjivar PT Physical Therapist                   OP Exercises     Row Name 07/26/22 1400             Subjective Comments    Subjective Comments Reports  feels pain is improving, although realizes that it will take time to see results from interventions  -CC              Subjective Pain    Able to rate subjective pain? yes  -CC      Subjective Pain Comment mild today  -CC              Total Minutes    02148 - PT Therapeutic Exercise Minutes 41  -CC              Exercise 1    Exercise Name 1 NuStep  -CC      Cueing 1 Verbal  -CC      Time 1 5 min  -CC      Additional Comments B UE/LE  -CC              Exercise 3    Exercise Name 3 objective measure update  -CC              Exercise 4    Exercise Name 4 monster walk fwd/bkwd  -CC      Cueing 4 Verbal  -CC      Reps 4 3 laps  -CC      Time 4 GTB  -CC              Exercise 5    Exercise Name 5 S/L T/L rotation  -CC      Cueing 5 Verbal;Demo  -CC      Reps 5 10e  -CC      Time 5 5sec  -CC              Exercise 6    Exercise Name 6 side steps  -CC      Cueing 6 Verbal  -CC      Reps 6 3 laps  -CC      Time 6 GTB knees  -CC              Exercise 7    Exercise Name 7 prone quad stretch w/ strap  -CC      Cueing 7 Verbal;Demo  -CC      Reps 7 3e  -CC      Time 7 20sec  -CC              Exercise 8    Exercise Name 8 SB roll out  -CC      Cueing 8 Verbal  -CC      Sets 8 fwd and lat  -CC      Reps 8 8 ea way  -CC      Time 8 5 sec  -CC              Exercise 9    Exercise Name 9 s/l clam  -CC      Cueing 9 Verbal;Tactile  -CC      Sets 9 2 salvador  -CC      Reps 9 10  -CC      Time 9 GTB  -CC              Exercise 10    Exercise Name 10 bridge w/ TB  -CC      Cueing 10 Verbal  -CC      Sets 10 2  -CC      Reps 10 10  -CC      Time 10 cues to start w/ PPT  -CC      Additional Comments GTB  -CC              Exercise 11    Exercise Name 11 thien pose  -CC      Cueing 11 Verbal;Demo  -CC      Reps 11 2  -CC      Time 11 20sec  -CC              Exercise 12    Exercise Name 12 QL stretch  -CC      Cueing 12 Verbal;Demo  -CC      Reps 12 2e  -CC      Time 12 20sec  -CC            User Key  (r) = Recorded By, (t) = Taken By, (c) = Cosigned  By    Initials Name Provider Type    Darlin Ortega, PT Physical Therapist                              PT OP Goals     Row Name 07/26/22 1400          PT Short Term Goals    STG Date to Achieve 07/27/22  -CC     STG 1 Pt. Will be independent with initial HEP to improve self-management of condition.  -CC     STG 1 Progress Met  -CC     STG 2 Pt. Will demonstrate proper log roll technique without cueing to reduce lumbar strain and preserve spine.  -CC     STG 2 Progress Met  -CC     STG 3 Pt. will be compliant with use of lumbar support when traveling in car for long distances to improve postural alignment and reduce LBP.  -CC     STG 3 Progress Ongoing  -CC            Long Term Goals    LTG Date to Achieve 08/26/22  -CC     LTG 1 Pt. Will be independent with advanced HEP to improve long-term management of condition and independence.  -CC     LTG 1 Progress Ongoing  -CC     LTG 2 Pt. Will demonstrate proper transition from kneel<>stand to improve ability to participate in hobbies of photography.  -CC     LTG 2 Progress Ongoing  -CC     LTG 3 Pt. will improve B hip girdle strength >/= 4/5 to improve lumbopelvic stability.  -CC     LTG 3 Progress Ongoing;Progressing;Partially Met  -CC     LTG 3 Progress Comments hip abd met, hip ext ongoing  -CC     LTG 4 Pt. Will score </= 25% on Modified Oswestry  (from 48% on initial evaluation) to indicate improved perception of disability.  -CC     LTG 4 Progress Ongoing;Progressing  -CC     LTG 4 Progress Comments 40% disability today, improved from eval  -           User Key  (r) = Recorded By, (t) = Taken By, (c) = Cosigned By    Initials Name Provider Type    Darlin Ortega PT Physical Therapist                     Outcome Measure Options: Modified Oswestry  Modified Oswestry  Modified Oswestry Score/Comments: 20/50 = 40% disability      Time Calculation:   Start Time: 1400  Stop Time: 1441  Time Calculation (min): 41 min  Total Timed Code Minutes- PT: 41  minute(s)  Timed Charges  68290 - PT Therapeutic Exercise Minutes: 41  Total Minutes  Timed Charges Total Minutes: 41   Total Minutes: 41  Therapy Charges for Today     Code Description Service Date Service Provider Modifiers Qty    76224961468  PT THER PROC EA 15 MIN 7/26/2022 Darlin Menjivar, PT GP 3          PT G-Codes  Outcome Measure Options: Modified Oswestry  Modified Oswestry Score/Comments: 20/50 = 40% disability         Darlin Menjivar, PT  7/26/2022

## 2022-07-28 ENCOUNTER — HOSPITAL ENCOUNTER (OUTPATIENT)
Dept: PHYSICAL THERAPY | Facility: HOSPITAL | Age: 67
Setting detail: THERAPIES SERIES
Discharge: HOME | End: 2022-07-28

## 2022-07-28 DIAGNOSIS — M54.50 CHRONIC MIDLINE LOW BACK PAIN WITHOUT SCIATICA: Primary | ICD-10-CM

## 2022-07-28 DIAGNOSIS — G89.29 CHRONIC MIDLINE LOW BACK PAIN WITHOUT SCIATICA: Primary | ICD-10-CM

## 2022-07-28 DIAGNOSIS — M53.86 DECREASED RANGE OF MOTION OF LUMBAR SPINE: ICD-10-CM

## 2022-07-28 PROCEDURE — 97110 THERAPEUTIC EXERCISES: CPT

## 2022-07-28 NOTE — THERAPY TREATMENT NOTE
Outpatient Physical Therapy Ortho Treatment Note  [facility]     Patient Name: Arash Roman  : 1955  MRN: [id number]  Today's Date: 2022      Visit Date: 2022    Visit Dx:    ICD-10-CM ICD-9-CM   1. Chronic midline low back pain without sciatica  M54.50 724.2    G89.29 338.29   2. Decreased range of motion of lumbar spine  M53.86 724.9       Patient Active Problem List   Diagnosis   • Hypothyroidism   • Hyperlipidemia   • Depression   • Peyronie's disease   • Adhesive capsulitis of left shoulder   • Allergic rhinitis   • Vitamin D deficiency   • Colon polyp   • Family history of colon cancer        Past Medical History:   Diagnosis Date   • Allergic rhinitis    • Basal cell carcinoma     followed by Dermatology, Dr. Lopez   • Colon polyp    • Depression    • Frozen shoulder    • Hypercholesteremia    • Hypothyroidism    • Peyronie's disease    • Vitamin D deficiency         Past Surgical History:   Procedure Laterality Date   • COLONOSCOPY N/A 2016    Procedure: COLONOSCOPY to cecum/TI; polypectomy(cold bx);  Surgeon: Juno Steven MD;  Location: [facility] ENDOSCOPY;  Service:    • COLONOSCOPY N/A 10/15/2018    Procedure: COLONOSCOPY TO CECUM/TI WITH POLYPECTOMY (COLD BX);  Surgeon: Juno Steven MD;  Location: [facility] ENDOSCOPY;  Service: Gastroenterology   • COLONOSCOPY N/A 2020    Procedure: COLONOSCOPY to cecum and TI:;  Surgeon: Juno Steven MD;  Location: [facility] ENDOSCOPY;  Service: Gastroenterology;  Laterality: N/A;  family hx colon cancer, personal hx colon polyps  post:  externa; hemorrhoids,    • TONSILLECTOMY                          PT Assessment/Plan     Row Name 22 1400          PT Assessment    Assessment Comments Pt returns reporting low level discomfort today, although still gets sharp pain at times. Continued with progression of core/hip functional strength challenges this date. Pt remains appropriate for skilled PT services.  -CC            PT Plan    PT  Plan Comments next visit: push/pull at CC, rotational lunge, [employer]  -CC           User Key  (r) = Recorded By, (t) = Taken By, (c) = Cosigned By    Initials Name Provider Type    Darlin Ortega, PT Physical Therapist                   OP Exercises     Row Name 07/28/22 1400             Subjective Comments    Subjective Comments Reports overall doing well, some soreness in sides, sometimes still gets a sharp pain.  -CC              Subjective Pain    Able to rate subjective pain? yes  -CC      Subjective Pain Comment mild discomfort  -CC              Total Minutes    37471 - PT Therapeutic Exercise Minutes 38  -CC              Exercise 1    Exercise Name 1 NuStep  -CC      Cueing 1 Verbal  -CC      Time 1 5 min  -CC      Additional Comments B UE/LE  -CC              Exercise 3    Exercise Name 3 AR Press  -CC      Cueing 3 Verbal  -CC      Sets 3 2bil  -CC      Reps 3 10  -CC      Time 3 GTB doubled  -CC              Exercise 4    Exercise Name 4 monster walk fwd/bkwd  -CC      Cueing 4 Verbal  -CC      Reps 4 4 laps  -CC      Time 4 GTB  -CC              Exercise 5    Exercise Name 5 S/L T/L rotation  -CC      Cueing 5 Verbal;Demo  -CC      Reps 5 10e  -CC      Time 5 5sec  -CC              Exercise 6    Exercise Name 6 side steps  -CC      Cueing 6 Verbal  -CC      Reps 6 4 laps  -CC      Time 6 GTB knees  -CC              Exercise 7    Exercise Name 7 alt tband shldr ext  -CC      Cueing 7 Verbal  -CC      Sets 7 2  -CC      Reps 7 10  -CC      Time 7 GTB  -CC              Exercise 8    Exercise Name 8 SB roll out  -CC      Cueing 8 Verbal  -CC      Sets 8 fwd and lat  -CC      Reps 8 8 ea way  -CC      Time 8 5 sec  -CC              Exercise 9    Exercise Name 9 s/l clam  -CC      Cueing 9 Verbal;Tactile  -CC      Sets 9 2 salvador  -CC      Reps 9 10  -CC      Time 9 GTB  -CC              Exercise 10    Exercise Name 10 bridge w/ TB  -CC      Cueing 10 Verbal  -CC      Sets 10 2  -CC      Reps 10 10   -CC      Time 10 cues to start w/ PPT  -CC      Additional Comments GTB  -CC              Exercise 11    Exercise Name 11 thien pose  -CC      Cueing 11 Verbal;Demo  -CC      Reps 11 2  -CC      Time 11 20sec  -CC              Exercise 12    Exercise Name 12 QL stretch  -CC      Cueing 12 Verbal;Demo  -CC      Reps 12 2e  -CC      Time 12 20sec  -CC              Exercise 13    Exercise Name 13 squats at barre  -CC      Cueing 13 Demo  -CC      Sets 13 2  -CC      Reps 13 10  -CC      Additional Comments cues for posterior weight shift  -CC              Exercise 14    Exercise Name 14 high plank at barre with alt shoulder tap  -CC      Cueing 14 Demo  -CC      Sets 14 2  -CC      Reps 14 10  -CC      Additional Comments cues for hips down and TrA  -CC            User Key  (r) = Recorded By, (t) = Taken By, (c) = Cosigned By    Initials Name Provider Type    CC Darlin Menjivar, PT Physical Therapist                                                Time Calculation:   Start Time: 1401  Stop Time: 1439  Time Calculation (min): 38 min  Total Timed Code Minutes- PT: 38 minute(s)  Timed Charges  39135 - PT Therapeutic Exercise Minutes: 38  Total Minutes  Timed Charges Total Minutes: 38   Total Minutes: 38  Therapy Charges for Today     Code Description Service Date Service Provider Modifiers Qty    99466411860 HC PT THER PROC EA 15 MIN 7/28/2022 Darlin Menjivar, PT GP 3                    Darlin Menjivar, PT  7/28/2022

## 2022-08-02 ENCOUNTER — HOSPITAL ENCOUNTER (OUTPATIENT)
Dept: PHYSICAL THERAPY | Facility: HOSPITAL | Age: 67
Setting detail: THERAPIES SERIES
Discharge: HOME | End: 2022-08-02

## 2022-08-02 DIAGNOSIS — M53.86 DECREASED RANGE OF MOTION OF LUMBAR SPINE: ICD-10-CM

## 2022-08-02 DIAGNOSIS — G89.29 CHRONIC MIDLINE LOW BACK PAIN WITHOUT SCIATICA: Primary | ICD-10-CM

## 2022-08-02 DIAGNOSIS — M54.50 CHRONIC MIDLINE LOW BACK PAIN WITHOUT SCIATICA: Primary | ICD-10-CM

## 2022-08-02 PROCEDURE — 97110 THERAPEUTIC EXERCISES: CPT

## 2022-08-02 NOTE — THERAPY TREATMENT NOTE
Outpatient Physical Therapy Ortho Treatment Note  [facility]     Patient Name: Arash Roman  : 1955  MRN: [id number]  Today's Date: 2022      Visit Date: 2022    Visit Dx:    ICD-10-CM ICD-9-CM   1. Chronic midline low back pain without sciatica  M54.50 724.2    G89.29 338.29   2. Decreased range of motion of lumbar spine  M53.86 724.9       Patient Active Problem List   Diagnosis   • Hypothyroidism   • Hyperlipidemia   • Depression   • Peyronie's disease   • Adhesive capsulitis of left shoulder   • Allergic rhinitis   • Vitamin D deficiency   • Colon polyp   • Family history of colon cancer        Past Medical History:   Diagnosis Date   • Allergic rhinitis    • Basal cell carcinoma     followed by Dermatology, Dr. Lopez   • Colon polyp    • Depression    • Frozen shoulder    • Hypercholesteremia    • Hypothyroidism    • Peyronie's disease    • Vitamin D deficiency         Past Surgical History:   Procedure Laterality Date   • COLONOSCOPY N/A 2016    Procedure: COLONOSCOPY to cecum/TI; polypectomy(cold bx);  Surgeon: Juno Steven MD;  Location: [facility] ENDOSCOPY;  Service:    • COLONOSCOPY N/A 10/15/2018    Procedure: COLONOSCOPY TO CECUM/TI WITH POLYPECTOMY (COLD BX);  Surgeon: Juno Steven MD;  Location: [facility] ENDOSCOPY;  Service: Gastroenterology   • COLONOSCOPY N/A 2020    Procedure: COLONOSCOPY to cecum and TI:;  Surgeon: Juno Steven MD;  Location: [facility] ENDOSCOPY;  Service: Gastroenterology;  Laterality: N/A;  family hx colon cancer, personal hx colon polyps  post:  externa; hemorrhoids,    • TONSILLECTOMY                          PT Assessment/Plan     Row Name 22 1352          PT Assessment    Assessment Comments Mr. Roman returns to clinic noting improvement in pain throughout the day but continues to experience discomfort when laying down at night. He does verbalize feeling progress and improvement with therapy thus far compared to pain at evaluation  though does have complaints regarding mobility and still unable to tie shoes when seated with leg extended. Further progressed hip girdle/core strength with added push/pull at cable column for functional strength. Pt. paz continue to benefit from skilled PT and is progressing toward D/C.  -MH            PT Plan    PT Plan Comments update HEP  -MH           User Key  (r) = Recorded By, (t) = Taken By, (c) = Cosigned By    Initials Name Provider Type    Kelsey Gray, PT Physical Therapist                   OP Exercises     Row Name 08/02/22 1300             Subjective Comments    Subjective Comments I still experience the pain that brought me here like when I lay down at night but throughout the day with certain activities I don't seem to notice it as much.  -MH              Total Minutes    35733 - PT Therapeutic Exercise Minutes 38  -MH              Exercise 1    Exercise Name 1 NuStep  -MH      Cueing 1 Verbal  -MH      Time 1 5 min  -MH      Additional Comments B UE/LE  -MH              Exercise 3    Exercise Name 3 AR Press  -MH      Cueing 3 Verbal  -MH      Sets 3 2bil  -MH      Reps 3 10  -MH      Time 3 GTB doubled  -MH              Exercise 4    Exercise Name 4 monster walk fwd/bkwd  -MH      Cueing 4 Verbal  -MH      Reps 4 4 laps  -MH      Time 4 GTB  -MH              Exercise 5    Exercise Name 5 S/L T/L rotation  -MH      Cueing 5 Verbal;Demo  -MH      Reps 5 10e  -MH      Time 5 5sec  -MH              Exercise 6    Exercise Name 6 side steps  -MH      Cueing 6 Verbal  -MH      Reps 6 4 laps  -MH      Time 6 GTB knees  -MH              Exercise 7    Exercise Name 7 alt tband shldr ext  -MH      Cueing 7 Verbal  -MH      Sets 7 2  -MH      Reps 7 10  -MH      Time 7 GTB  -MH              Exercise 8    Exercise Name 8 SB roll out  -MH      Cueing 8 Verbal  -MH      Sets 8 fwd and lat  -MH      Reps 8 8 ea way  -MH      Time 8 5 sec  -MH              Exercise 13    Exercise Name 13 squats at barre   -      Cueing 13 Demo  -      Sets 13 2  -      Reps 13 10  -MH              Exercise 14    Exercise Name 14 high plank at barre with alt shoulder tap  -      Cueing 14 Demo  -      Sets 14 2  -MH      Reps 14 10  -              Exercise 15    Exercise Name 15 push/pull at CC  -      Cueing 15 Verbal;Demo  -      Sets 15 2e  -      Reps 15 10e  -      Time 15 30#  -            User Key  (r) = Recorded By, (t) = Taken By, (c) = Cosigned By    Initials Name Provider Type     Kelsey Hurley, PT Physical Therapist                              PT OP Goals     Row Name 08/02/22 1400          PT Short Term Goals    STG Date to Achieve 07/27/22  -     STG 1 Pt. Will be independent with initial HEP to improve self-management of condition.  -     STG 1 Progress Met  -     STG 2 Pt. Will demonstrate proper log roll technique without cueing to reduce lumbar strain and preserve spine.  -     STG 2 Progress Met  -     STG 3 Pt. will be compliant with use of lumbar support when traveling in car for long distances to improve postural alignment and reduce LBP.  -     STG 3 Progress Ongoing  -            Long Term Goals    LTG Date to Achieve 08/26/22  -     LTG 1 Pt. Will be independent with advanced HEP to improve long-term management of condition and independence.  -     LTG 1 Progress Ongoing  -     LTG 2 Pt. Will demonstrate proper transition from kneel<>stand to improve ability to participate in hobbies of photography.  -     LTG 2 Progress Ongoing  -     LTG 3 Pt. will improve B hip girdle strength >/= 4/5 to improve lumbopelvic stability.  -     LTG 3 Progress Ongoing;Progressing;Partially Met  -     LTG 4 Pt. Will score </= 25% on Modified Oswestry  (from 48% on initial evaluation) to indicate improved perception of disability.  -     LTG 4 Progress Ongoing;Progressing  -           User Key  (r) = Recorded By, (t) = Taken By, (c) = Cosigned By    Initials Name Provider  Type     Kelsey Hurley, PT Physical Therapist                Therapy Education  Given: Symptoms/condition management, Posture/body mechanics  Program: Reinforced  How Provided: Verbal, Demonstration  Provided to: Patient  Level of Understanding: Verbalized, Demonstrated              Time Calculation:   Start Time: 1348  Stop Time: 1426  Time Calculation (min): 38 min  Total Timed Code Minutes- PT: 38 minute(s)  Timed Charges  95209 - PT Therapeutic Exercise Minutes: 38  Total Minutes  Timed Charges Total Minutes: 38   Total Minutes: 38  Therapy Charges for Today     Code Description Service Date Service Provider Modifiers Qty    39013583193 HC PT THER PROC EA 15 MIN 8/2/2022 Kelsey Hurley, PT GP 3                    Kelsey Hurley PT  8/2/2022

## 2022-08-04 ENCOUNTER — HOSPITAL ENCOUNTER (OUTPATIENT)
Dept: PHYSICAL THERAPY | Facility: HOSPITAL | Age: 67
Setting detail: THERAPIES SERIES
Discharge: HOME | End: 2022-08-04

## 2022-08-04 DIAGNOSIS — G89.29 CHRONIC MIDLINE LOW BACK PAIN WITHOUT SCIATICA: Primary | ICD-10-CM

## 2022-08-04 DIAGNOSIS — M54.50 CHRONIC MIDLINE LOW BACK PAIN WITHOUT SCIATICA: Primary | ICD-10-CM

## 2022-08-04 DIAGNOSIS — M53.86 DECREASED RANGE OF MOTION OF LUMBAR SPINE: ICD-10-CM

## 2022-08-04 PROCEDURE — 97110 THERAPEUTIC EXERCISES: CPT

## 2022-08-04 NOTE — THERAPY TREATMENT NOTE
Outpatient Physical Therapy Ortho Treatment Note  [facility]     Patient Name: Arash Roman  : 1955  MRN: [id number]  Today's Date: 2022      Visit Date: 2022    Visit Dx:    ICD-10-CM ICD-9-CM   1. Chronic midline low back pain without sciatica  M54.50 724.2    G89.29 338.29   2. Decreased range of motion of lumbar spine  M53.86 724.9       Patient Active Problem List   Diagnosis   • Hypothyroidism   • Hyperlipidemia   • Depression   • Peyronie's disease   • Adhesive capsulitis of left shoulder   • Allergic rhinitis   • Vitamin D deficiency   • Colon polyp   • Family history of colon cancer        Past Medical History:   Diagnosis Date   • Allergic rhinitis    • Basal cell carcinoma     followed by Dermatology, Dr. Lopez   • Colon polyp    • Depression    • Frozen shoulder    • Hypercholesteremia    • Hypothyroidism    • Peyronie's disease    • Vitamin D deficiency         Past Surgical History:   Procedure Laterality Date   • COLONOSCOPY N/A 2016    Procedure: COLONOSCOPY to cecum/TI; polypectomy(cold bx);  Surgeon: Juno Steven MD;  Location: [facility] ENDOSCOPY;  Service:    • COLONOSCOPY N/A 10/15/2018    Procedure: COLONOSCOPY TO CECUM/TI WITH POLYPECTOMY (COLD BX);  Surgeon: Juno Steven MD;  Location: [facility] ENDOSCOPY;  Service: Gastroenterology   • COLONOSCOPY N/A 2020    Procedure: COLONOSCOPY to cecum and TI:;  Surgeon: Juno Steven MD;  Location: [facility] ENDOSCOPY;  Service: Gastroenterology;  Laterality: N/A;  family hx colon cancer, personal hx colon polyps  post:  externa; hemorrhoids,    • TONSILLECTOMY                          PT Assessment/Plan     Row Name 22 1400          PT Assessment    Assessment Comments Mr. Roman returns to clinic noting continued difficulty with HS tightness/general stiffness and difficulty tying shoes when LE is extended. Initiated session with HS stretch at steps and discussed importance of frequency and consistency with  stretches for improvement. Reviewed HEP and dicussed plan to trial independent management for 2-3 weeks prior to return to assess further progressions. Pt. in agreement with plan.  -            PT Plan    PT Plan Comments assess independent management  -           User Key  (r) = Recorded By, (t) = Taken By, (c) = Cosigned By    Initials Name Provider Type     Kelsey Hurley, PT Physical Therapist                   OP Exercises     Row Name 08/04/22 1300             Subjective Comments    Subjective Comments I would like to maybe try these  -              Subjective Pain    Subjective Pain Comment Pt. arrives 13:55 for 13:45 appointment.  -MH              Total Minutes    29363 - PT Therapeutic Exercise Minutes 26  -MH              Exercise 1    Exercise Name 1 NuStep  -MH      Cueing 1 Verbal  -MH      Time 1 5 min  -MH      Additional Comments B UE/LE  -MH              Exercise 2    Exercise Name 2 HS stretch at step  -MH      Cueing 2 Verbal;Demo  -MH      Reps 2 3B  -MH      Time 2 20sec  -MH              Exercise 4    Exercise Name 4 monster walk fwd/bkwd  -MH      Cueing 4 Verbal  -MH      Reps 4 4 laps  -MH      Time 4 GTB  -MH              Exercise 6    Exercise Name 6 side steps  -MH      Cueing 6 Verbal  -MH      Reps 6 4 laps  -MH      Time 6 GTB knees  -MH              Exercise 9    Exercise Name 9 gastroc stretch off step  -MH      Cueing 9 Verbal  -MH      Reps 9 3  -MH      Time 9 20sec  -MH              Exercise 15    Exercise Name 15 push/pull at CC  -MH      Cueing 15 Verbal;Demo  -MH      Sets 15 2e  -MH      Reps 15 10e  -MH      Time 15 30#  -MH            User Key  (r) = Recorded By, (t) = Taken By, (c) = Cosigned By    Initials Name Provider Type     Kelsey Hurley, PT Physical Therapist                              PT OP Goals     Row Name 08/04/22 1400          PT Short Term Goals    STG Date to Achieve 07/27/22  -MH     STG 1 Pt. Will be independent with initial HEP to improve  self-management of condition.  -     STG 1 Progress Met  -     STG 2 Pt. Will demonstrate proper log roll technique without cueing to reduce lumbar strain and preserve spine.  -     STG 2 Progress Met  -     STG 3 Pt. will be compliant with use of lumbar support when traveling in car for long distances to improve postural alignment and reduce LBP.  -     STG 3 Progress Ongoing  -            Long Term Goals    LTG Date to Achieve 08/26/22  -     LTG 1 Pt. Will be independent with advanced HEP to improve long-term management of condition and independence.  -     LTG 1 Progress Ongoing  -     LTG 2 Pt. Will demonstrate proper transition from kneel<>stand to improve ability to participate in hobbies of photography.  -     LTG 2 Progress Ongoing  -     LTG 3 Pt. will improve B hip girdle strength >/= 4/5 to improve lumbopelvic stability.  -     LTG 3 Progress Ongoing;Progressing;Partially Met  -     LTG 4 Pt. Will score </= 25% on Modified Oswestry  (from 48% on initial evaluation) to indicate improved perception of disability.  -     LTG 4 Progress Ongoing;Progressing  -           User Key  (r) = Recorded By, (t) = Taken By, (c) = Cosigned By    Initials Name Provider Type    Kelsey Gray, PT Physical Therapist                Therapy Education  Education Details: Updated [facility] Access Code: [email or web address]  Given: Symptoms/condition management, Posture/body mechanics  Program: Reinforced  How Provided: Verbal, Demonstration  Provided to: Patient  Level of Understanding: Verbalized, Demonstrated              Time Calculation:   Start Time: 1400 (arrival time 1355 for 1345 appt)  Stop Time: 1426  Time Calculation (min): 26 min  Total Timed Code Minutes- PT: 26 minute(s)  Timed Charges  75421 - PT Therapeutic Exercise Minutes: 26  Total Minutes  Timed Charges Total Minutes: 26   Total Minutes: 26  Therapy Charges for Today     Code Description Service Date Service Provider Modifiers Qty     38055404296  PT THER PROC EA 15 MIN 8/4/2022 Kelsey Hurley, PT GP 2                    Kelsey Hurley, PT  8/4/2022

## 2022-08-29 DIAGNOSIS — E03.9 HYPOTHYROIDISM, UNSPECIFIED TYPE: ICD-10-CM

## 2022-08-29 DIAGNOSIS — E78.5 HYPERLIPIDEMIA, UNSPECIFIED HYPERLIPIDEMIA TYPE: ICD-10-CM

## 2022-08-29 RX ORDER — LEVOTHYROXINE SODIUM 88 UG/1
TABLET ORAL
Qty: 90 TABLET | Refills: 1 | Status: SHIPPED | OUTPATIENT
Start: 2022-08-29 | End: 2023-02-27 | Stop reason: SDUPTHER

## 2022-08-29 RX ORDER — ROSUVASTATIN CALCIUM 5 MG/1
TABLET, COATED ORAL
Qty: 90 TABLET | Refills: 1 | Status: SHIPPED | OUTPATIENT
Start: 2022-08-29 | End: 2023-02-27 | Stop reason: SDUPTHER

## 2022-11-21 DIAGNOSIS — Z12.5 PROSTATE CANCER SCREENING: ICD-10-CM

## 2022-11-21 DIAGNOSIS — E03.9 HYPOTHYROIDISM, UNSPECIFIED TYPE: Primary | ICD-10-CM

## 2022-11-21 DIAGNOSIS — E78.5 HYPERLIPIDEMIA, UNSPECIFIED HYPERLIPIDEMIA TYPE: ICD-10-CM

## 2022-11-21 LAB
ALBUMIN SERPL-MCNC: 4.5 G/DL (ref 3.5–5.2)
ALBUMIN/GLOB SERPL: 2.1 G/DL
ALP SERPL-CCNC: 62 U/L (ref 39–117)
ALT SERPL-CCNC: 21 U/L (ref 1–41)
AST SERPL-CCNC: 22 U/L (ref 1–40)
BASOPHILS # BLD AUTO: 0.03 10*3/MM3 (ref 0–0.2)
BASOPHILS NFR BLD AUTO: 0.7 % (ref 0–1.5)
BILIRUB SERPL-MCNC: 0.4 MG/DL (ref 0–1.2)
BUN SERPL-MCNC: 20 MG/DL (ref 8–23)
BUN/CREAT SERPL: 17.9 (ref 7–25)
CALCIUM SERPL-MCNC: 9.1 MG/DL (ref 8.6–10.5)
CHLORIDE SERPL-SCNC: 105 MMOL/L (ref 98–107)
CHOLEST SERPL-MCNC: 163 MG/DL (ref 0–200)
CO2 SERPL-SCNC: 26.2 MMOL/L (ref 22–29)
CREAT SERPL-MCNC: 1.12 MG/DL (ref 0.76–1.27)
EGFRCR SERPLBLD CKD-EPI 2021: 71.6 ML/MIN/1.73
EOSINOPHIL # BLD AUTO: 0.1 10*3/MM3 (ref 0–0.4)
EOSINOPHIL NFR BLD AUTO: 2.4 % (ref 0.3–6.2)
ERYTHROCYTE [DISTWIDTH] IN BLOOD BY AUTOMATED COUNT: 12.2 % (ref 12.3–15.4)
GLOBULIN SER CALC-MCNC: 2.1 GM/DL
GLUCOSE SERPL-MCNC: 111 MG/DL (ref 65–99)
HCT VFR BLD AUTO: 42.5 % (ref 37.5–51)
HDLC SERPL-MCNC: 50 MG/DL (ref 40–60)
HGB BLD-MCNC: 15 G/DL (ref 13–17.7)
IMM GRANULOCYTES # BLD AUTO: 0.02 10*3/MM3 (ref 0–0.05)
IMM GRANULOCYTES NFR BLD AUTO: 0.5 % (ref 0–0.5)
LDLC SERPL CALC-MCNC: 98 MG/DL (ref 0–100)
LYMPHOCYTES # BLD AUTO: 1.13 10*3/MM3 (ref 0.7–3.1)
LYMPHOCYTES NFR BLD AUTO: 26.6 % (ref 19.6–45.3)
MCH RBC QN AUTO: 32.6 PG (ref 26.6–33)
MCHC RBC AUTO-ENTMCNC: 35.3 G/DL (ref 31.5–35.7)
MCV RBC AUTO: 92.4 FL (ref 79–97)
MONOCYTES # BLD AUTO: 0.56 10*3/MM3 (ref 0.1–0.9)
MONOCYTES NFR BLD AUTO: 13.2 % (ref 5–12)
NEUTROPHILS # BLD AUTO: 2.41 10*3/MM3 (ref 1.7–7)
NEUTROPHILS NFR BLD AUTO: 56.6 % (ref 42.7–76)
NRBC BLD AUTO-RTO: 0 /100 WBC (ref 0–0.2)
PLATELET # BLD AUTO: 152 10*3/MM3 (ref 140–450)
POTASSIUM SERPL-SCNC: 4.2 MMOL/L (ref 3.5–5.2)
PROT SERPL-MCNC: 6.6 G/DL (ref 6–8.5)
PSA SERPL-MCNC: 2.86 NG/ML (ref 0–4)
RBC # BLD AUTO: 4.6 10*6/MM3 (ref 4.14–5.8)
SODIUM SERPL-SCNC: 139 MMOL/L (ref 136–145)
TRIGL SERPL-MCNC: 80 MG/DL (ref 0–150)
TSH SERPL DL<=0.005 MIU/L-ACNC: 1.43 UIU/ML (ref 0.27–4.2)
VLDLC SERPL CALC-MCNC: 15 MG/DL (ref 5–40)
WBC # BLD AUTO: 4.25 10*3/MM3 (ref 3.4–10.8)

## 2022-11-23 LAB
HBA1C MFR BLD: 5.2 % (ref 4.8–5.6)
Lab: NORMAL
WRITTEN AUTHORIZATION: NORMAL

## 2022-11-30 ENCOUNTER — OFFICE VISIT (OUTPATIENT)
Dept: INTERNAL MEDICINE | Facility: CLINIC | Age: 67
End: 2022-11-30

## 2022-11-30 VITALS
HEIGHT: 68 IN | DIASTOLIC BLOOD PRESSURE: 80 MMHG | SYSTOLIC BLOOD PRESSURE: 132 MMHG | HEART RATE: 74 BPM | BODY MASS INDEX: 27.58 KG/M2 | WEIGHT: 182 LBS

## 2022-11-30 DIAGNOSIS — Z00.00 MEDICARE ANNUAL WELLNESS VISIT, SUBSEQUENT: ICD-10-CM

## 2022-11-30 DIAGNOSIS — R03.0 ELEVATED BP WITHOUT DIAGNOSIS OF HYPERTENSION: Primary | ICD-10-CM

## 2022-11-30 DIAGNOSIS — Z63.6 CAREGIVER STRESS: ICD-10-CM

## 2022-11-30 DIAGNOSIS — E78.5 HYPERLIPIDEMIA, UNSPECIFIED HYPERLIPIDEMIA TYPE: ICD-10-CM

## 2022-11-30 DIAGNOSIS — E03.9 ACQUIRED HYPOTHYROIDISM: ICD-10-CM

## 2022-11-30 PROCEDURE — G0439 PPPS, SUBSEQ VISIT: HCPCS | Performed by: INTERNAL MEDICINE

## 2022-11-30 PROCEDURE — 1160F RVW MEDS BY RX/DR IN RCRD: CPT | Performed by: INTERNAL MEDICINE

## 2022-11-30 PROCEDURE — 1170F FXNL STATUS ASSESSED: CPT | Performed by: INTERNAL MEDICINE

## 2022-11-30 PROCEDURE — 1159F MED LIST DOCD IN RCRD: CPT | Performed by: INTERNAL MEDICINE

## 2022-11-30 SDOH — SOCIAL STABILITY - SOCIAL INSECURITY: DEPENDENT RELATIVE NEEDING CARE AT HOME: Z63.6

## 2022-12-05 DIAGNOSIS — I10 ESSENTIAL HYPERTENSION: ICD-10-CM

## 2022-12-05 DIAGNOSIS — R03.0 ELEVATED BP WITHOUT DIAGNOSIS OF HYPERTENSION: Primary | ICD-10-CM

## 2022-12-05 RX ORDER — LISINOPRIL 10 MG/1
10 TABLET ORAL DAILY
Qty: 90 TABLET | Refills: 1 | Status: SHIPPED | OUTPATIENT
Start: 2022-12-05 | End: 2023-02-27 | Stop reason: SDUPTHER

## 2023-02-21 ENCOUNTER — OFFICE VISIT (OUTPATIENT)
Dept: INTERNAL MEDICINE | Facility: CLINIC | Age: 68
End: 2023-02-21
Payer: MEDICARE

## 2023-02-21 VITALS
DIASTOLIC BLOOD PRESSURE: 74 MMHG | BODY MASS INDEX: 27.28 KG/M2 | HEART RATE: 76 BPM | HEIGHT: 68 IN | WEIGHT: 180 LBS | SYSTOLIC BLOOD PRESSURE: 124 MMHG

## 2023-02-21 DIAGNOSIS — I10 ESSENTIAL HYPERTENSION: Primary | ICD-10-CM

## 2023-02-21 DIAGNOSIS — Z63.6 CAREGIVER BURDEN: ICD-10-CM

## 2023-02-21 PROCEDURE — 99213 OFFICE O/P EST LOW 20 MIN: CPT | Performed by: INTERNAL MEDICINE

## 2023-02-21 SDOH — SOCIAL STABILITY - SOCIAL INSECURITY: DEPENDENT RELATIVE NEEDING CARE AT HOME: Z63.6

## 2023-02-21 NOTE — PROGRESS NOTES
"Chief Complaint  Hypertension    Subjective        Arash Roman presents to [facility]  History of Present Illness BP f/u - no problems with lisinopril.  Readings 120s/.80s at home.   Questions about general health- some increase in urinary frequency but no other change.  occ nocturia-   He is walking but not much - limited by what he can do as he cares for his wife with dementia.     Objective   Vital Signs:  /74   Pulse 76   Ht 172.7 cm (68\")   Wt 81.6 kg (180 lb)   BMI 27.37 kg/m²   Estimated body mass index is 27.37 kg/m² as calculated from the following:    Height as of this encounter: 172.7 cm (68\").    Weight as of this encounter: 81.6 kg (180 lb).             Physical Exam  Constitutional:       Appearance: Normal appearance.   Cardiovascular:      Rate and Rhythm: Normal rate and regular rhythm.        Result Review :  The following data was reviewed by: Layla Siddiqui MD on 02/21/2023:  Common labs    Common Labs 11/21/22 11/21/22 11/21/22 11/21/22 11/21/22    1032 1032 1032 1032 1032   Glucose 111 (A)       BUN 20       Creatinine 1.12       Sodium 139       Potassium 4.2       Chloride 105       Calcium 9.1       Total Protein 6.6       Albumin 4.50       Total Bilirubin 0.4       Alkaline Phosphatase 62       AST (SGOT) 22       ALT (SGPT) 21       WBC    4.25    Hemoglobin    15.0    Hematocrit    42.5    Platelets    152    Total Cholesterol  163      Triglycerides  80      HDL Cholesterol  50      LDL Cholesterol   98      Hemoglobin A1C     5.20   PSA   2.860     (A) Abnormal value       Comments are available for some flowsheets but are not being displayed.                        Assessment and Plan   Diagnoses and all orders for this visit:    1. Essential hypertension (Primary)  Comments:  much improved with addition of lisinopril- no change for now.     2. Caregiver burden  Comments:  he is aware and working on getting the exercise he needs.          "     Follow Up   Return in about 9 months (around 11/21/2023) for Annual physical, Lab Before FUP.  Patient was given instructions and counseling regarding his condition or for health maintenance advice. Please see specific information pulled into the AVS if appropriate.

## 2023-02-27 DIAGNOSIS — I10 ESSENTIAL HYPERTENSION: ICD-10-CM

## 2023-02-27 DIAGNOSIS — E78.5 HYPERLIPIDEMIA, UNSPECIFIED HYPERLIPIDEMIA TYPE: ICD-10-CM

## 2023-02-27 DIAGNOSIS — E03.9 HYPOTHYROIDISM, UNSPECIFIED TYPE: ICD-10-CM

## 2023-02-27 RX ORDER — LISINOPRIL 10 MG/1
10 TABLET ORAL DAILY
Qty: 90 TABLET | Refills: 1 | Status: SHIPPED | OUTPATIENT
Start: 2023-02-27

## 2023-02-27 RX ORDER — LEVOTHYROXINE SODIUM 88 UG/1
88 TABLET ORAL DAILY
Qty: 90 TABLET | Refills: 1 | Status: SHIPPED | OUTPATIENT
Start: 2023-02-27

## 2023-02-27 RX ORDER — ROSUVASTATIN CALCIUM 5 MG/1
5 TABLET, COATED ORAL DAILY
Qty: 90 TABLET | Refills: 1 | Status: SHIPPED | OUTPATIENT
Start: 2023-02-27

## 2023-03-16 DIAGNOSIS — H26.9 CATARACT, UNSPECIFIED CATARACT TYPE, UNSPECIFIED LATERALITY: Primary | ICD-10-CM

## 2023-07-24 ENCOUNTER — PREP FOR SURGERY (OUTPATIENT)
Dept: OTHER | Facility: HOSPITAL | Age: 68
End: 2023-07-24
Payer: MEDICARE

## 2023-07-24 DIAGNOSIS — Z80.0 FAMILY HISTORY OF COLON CANCER: Primary | ICD-10-CM

## 2023-07-24 DIAGNOSIS — K63.5 COLON POLYP: ICD-10-CM

## 2023-07-25 ENCOUNTER — TELEPHONE (OUTPATIENT)
Dept: GASTROENTEROLOGY | Facility: CLINIC | Age: 68
End: 2023-07-25

## 2023-07-25 ENCOUNTER — TELEPHONE (OUTPATIENT)
Dept: GASTROENTEROLOGY | Facility: CLINIC | Age: 68
End: 2023-07-25
Payer: MEDICARE

## 2023-07-25 NOTE — TELEPHONE ENCOUNTER
Hub staff attempted to follow warm transfer process and was unsuccessful     Caller: Arash Roman    Relationship to patient: Self    Best call back number: [phone]     Patient is needing: PATIENT IS RETURNING CALL FROM [facility] REGARDING SCHEDULING WITH DR. HURTADO.

## 2023-07-25 NOTE — TELEPHONE ENCOUNTER
I have called pt but had to leave a VM. [facility] is booked this year and we will call pt once his 2024 opens or if he has any cancellations.

## 2023-07-26 ENCOUNTER — TELEPHONE (OUTPATIENT)
Dept: GASTROENTEROLOGY | Facility: CLINIC | Age: 68
End: 2023-07-26
Payer: MEDICARE

## 2023-07-26 NOTE — TELEPHONE ENCOUNTER
Called pt and upset and agitated. Pt states that he has always been on a 2 yrs c/s plan but when he had his last c/s Dr Steven had advised he could go 3 yrs.   Per c/s report in 11/2020 but had no specimens.  Pt states that his father had colon cancer.  Assured pt that his last colonoscopy he did not have any specimens and this is very good.  He states that this is unacceptable to wait this long. He states that he can not get done until next year and states he does not even know when this will be. States he is on the waiting list and does not know where he is on the list.  Pt kept reiterating that we are not taking care of our pts.  Advised pt we will send message to Dr Steven and manager.   Jam understanding.

## 2023-07-26 NOTE — TELEPHONE ENCOUNTER
----- Message from Deni Perez sent at 7/26/2023  8:51 AM EDT -----  Regarding: colonoscopy  Contact: [phone]  I called pt to let him know [facility] is booked this year and we have added him to a cancellation list. He is very upset and said he wants to talk to [facility] or [facility] nurse.

## 2023-07-26 NOTE — TELEPHONE ENCOUNTER
I have called and talked to pt he is aware that Tenisha I booked out the rest of this year. He has been added to [facility] cancellation list.

## 2023-07-27 NOTE — TELEPHONE ENCOUNTER
LEELA Camacho for COLONOSCOPY on 11/27/23  arrive at 12pm  . Sent  prep instructions to pt my chart...miralax

## 2023-08-22 DIAGNOSIS — E78.5 HYPERLIPIDEMIA, UNSPECIFIED HYPERLIPIDEMIA TYPE: ICD-10-CM

## 2023-08-22 DIAGNOSIS — E03.9 HYPOTHYROIDISM, UNSPECIFIED TYPE: ICD-10-CM

## 2023-08-22 DIAGNOSIS — I10 ESSENTIAL HYPERTENSION: ICD-10-CM

## 2023-08-22 RX ORDER — ROSUVASTATIN CALCIUM 5 MG/1
5 TABLET, COATED ORAL DAILY
Qty: 90 TABLET | Refills: 1 | Status: SHIPPED | OUTPATIENT
Start: 2023-08-22

## 2023-08-22 RX ORDER — LEVOTHYROXINE SODIUM 88 UG/1
88 TABLET ORAL DAILY
Qty: 90 TABLET | Refills: 1 | Status: SHIPPED | OUTPATIENT
Start: 2023-08-22

## 2023-08-22 RX ORDER — LISINOPRIL 10 MG/1
10 TABLET ORAL DAILY
Qty: 90 TABLET | Refills: 1 | Status: SHIPPED | OUTPATIENT
Start: 2023-08-22

## 2023-10-24 DIAGNOSIS — E78.5 HYPERLIPIDEMIA, UNSPECIFIED HYPERLIPIDEMIA TYPE: Primary | ICD-10-CM

## 2023-10-24 DIAGNOSIS — Z12.5 PROSTATE CANCER SCREENING: ICD-10-CM

## 2023-10-24 DIAGNOSIS — E03.9 HYPOTHYROIDISM, UNSPECIFIED TYPE: ICD-10-CM

## 2023-10-24 DIAGNOSIS — I10 ESSENTIAL HYPERTENSION: ICD-10-CM

## 2023-11-20 ENCOUNTER — TELEPHONE (OUTPATIENT)
Dept: GASTROENTEROLOGY | Facility: CLINIC | Age: 68
End: 2023-11-20
Payer: MEDICARE

## 2023-11-22 ENCOUNTER — TELEPHONE (OUTPATIENT)
Dept: GASTROENTEROLOGY | Facility: CLINIC | Age: 68
End: 2023-11-22

## 2023-11-22 NOTE — TELEPHONE ENCOUNTER
Hub staff attempted to follow warm transfer process and was unsuccessful     Caller: Arash Roman    Relationship to patient: Self    Best call back number: [phone]    Patient is needing: PATIENT HAS GOTTEN THE ANSWER TO QUESTION.  NO LONGER NEEDS A CALL. THANK YOU

## 2023-11-22 NOTE — TELEPHONE ENCOUNTER
Hub staff attempted to follow warm transfer process and was unsuccessful     Caller: Arash Roman    Relationship to patient: Self    Best call back number: [phone]    Patient is needing: PT IS REQUESTING A CALL FROM AS NURSE REGARDING HOW MUCH OF THE COLIN LAX POWDER HE NEEDS TO BUY TO MIX UP THAT WOULD BE THE SAME AS THE LIQUID. PT IS AT [facility] AND THEY ARE NOT BEING ANY HELP THERE. PLEASE CALL AND ADVISE. PT'S PROCEDURE IS ON 11/27.

## 2023-11-27 ENCOUNTER — HOSPITAL ENCOUNTER (OUTPATIENT)
Facility: HOSPITAL | Age: 68
Setting detail: HOSPITAL OUTPATIENT SURGERY
Discharge: HOME | End: 2023-11-27
Attending: INTERNAL MEDICINE | Admitting: INTERNAL MEDICINE
Payer: MEDICARE

## 2023-11-27 ENCOUNTER — ANESTHESIA EVENT (OUTPATIENT)
Dept: GASTROENTEROLOGY | Facility: HOSPITAL | Age: 68
End: 2023-11-27
Payer: MEDICARE

## 2023-11-27 ENCOUNTER — ANESTHESIA (OUTPATIENT)
Dept: GASTROENTEROLOGY | Facility: HOSPITAL | Age: 68
End: 2023-11-27
Payer: MEDICARE

## 2023-11-27 VITALS
HEIGHT: 67 IN | RESPIRATION RATE: 16 BRPM | TEMPERATURE: 98.6 F | BODY MASS INDEX: 25.9 KG/M2 | OXYGEN SATURATION: 99 % | SYSTOLIC BLOOD PRESSURE: 114 MMHG | HEART RATE: 64 BPM | DIASTOLIC BLOOD PRESSURE: 68 MMHG | WEIGHT: 165 LBS

## 2023-11-27 DIAGNOSIS — Z80.0 FAMILY HISTORY OF COLON CANCER: ICD-10-CM

## 2023-11-27 DIAGNOSIS — K63.5 COLON POLYP: ICD-10-CM

## 2023-11-27 PROCEDURE — 88305 TISSUE EXAM BY PATHOLOGIST: CPT | Performed by: INTERNAL MEDICINE

## 2023-11-27 PROCEDURE — 45385 COLONOSCOPY W/LESION REMOVAL: CPT | Performed by: INTERNAL MEDICINE

## 2023-11-27 PROCEDURE — 25810000003 LACTATED RINGERS PER 1000 ML: Performed by: INTERNAL MEDICINE

## 2023-11-27 PROCEDURE — 25010000002 PROPOFOL 10 MG/ML EMULSION: Performed by: ANESTHESIOLOGY

## 2023-11-27 RX ORDER — PROMETHAZINE HYDROCHLORIDE 25 MG/1
25 TABLET ORAL ONCE AS NEEDED
Status: DISCONTINUED | OUTPATIENT
Start: 2023-11-27 | End: 2023-11-27 | Stop reason: HOSPADM

## 2023-11-27 RX ORDER — PROMETHAZINE HYDROCHLORIDE 25 MG/1
25 SUPPOSITORY RECTAL ONCE AS NEEDED
Status: DISCONTINUED | OUTPATIENT
Start: 2023-11-27 | End: 2023-11-27 | Stop reason: HOSPADM

## 2023-11-27 RX ORDER — LIDOCAINE HYDROCHLORIDE 20 MG/ML
INJECTION, SOLUTION INFILTRATION; PERINEURAL AS NEEDED
Status: DISCONTINUED | OUTPATIENT
Start: 2023-11-27 | End: 2023-11-27 | Stop reason: SURG

## 2023-11-27 RX ORDER — PROPOFOL 10 MG/ML
VIAL (ML) INTRAVENOUS CONTINUOUS PRN
Status: DISCONTINUED | OUTPATIENT
Start: 2023-11-27 | End: 2023-11-27 | Stop reason: SURG

## 2023-11-27 RX ORDER — SODIUM CHLORIDE, SODIUM LACTATE, POTASSIUM CHLORIDE, CALCIUM CHLORIDE 600; 310; 30; 20 MG/100ML; MG/100ML; MG/100ML; MG/100ML
1000 INJECTION, SOLUTION INTRAVENOUS CONTINUOUS
Status: DISCONTINUED | OUTPATIENT
Start: 2023-11-27 | End: 2023-11-27 | Stop reason: HOSPADM

## 2023-11-27 RX ADMIN — SODIUM CHLORIDE, POTASSIUM CHLORIDE, SODIUM LACTATE AND CALCIUM CHLORIDE 1000 ML: 600; 310; 30; 20 INJECTION, SOLUTION INTRAVENOUS at 12:50

## 2023-11-27 RX ADMIN — LIDOCAINE HYDROCHLORIDE 60 MG: 20 INJECTION, SOLUTION INFILTRATION; PERINEURAL at 13:23

## 2023-11-27 RX ADMIN — PROPOFOL 200 MCG/KG/MIN: 10 INJECTION, EMULSION INTRAVENOUS at 13:24

## 2023-11-27 NOTE — ANESTHESIA POSTPROCEDURE EVALUATION
Patient: Arash Roman    Procedure Summary       Date: 11/27/23 [facility]    Anesthesia Start: 1320 Anesthesia Stop: 1348    Procedure: COLONOSCOPY TO CECUM/TI WITH COLD SNARE POLYPECTOMY Diagnosis:       Family history of colon cancer      Colon polyp      (Family history of colon cancer [Z80.0])      (Colon polyp [K63.5])    Surgeons: Juno Steven MD Provider: Aayush Foley MD    Anesthesia Type: MAC ASA Status: 2            Anesthesia Type: MAC    Vitals  Vitals Value Taken Time   /68 11/27/23 1406   Temp     Pulse 61 11/27/23 1408   Resp 16 11/27/23 1405   SpO2 98 % 11/27/23 1408   Vitals shown include unfiled device data.        Post Anesthesia Care and Evaluation    Patient location during evaluation: bedside  Pain management: adequate    Airway patency: patent  Anesthetic complications: No anesthetic complications    Cardiovascular status: acceptable  Respiratory status: acceptable  Hydration status: acceptable

## 2023-11-27 NOTE — ANESTHESIA PREPROCEDURE EVALUATION
Anesthesia Evaluation     NPO Solid Status: > 8 hours             Airway   Mallampati: II  TM distance: >3 FB  Neck ROM: full  Dental - normal exam     Pulmonary - normal exam   Cardiovascular - normal exam    (+) hypertension, hyperlipidemia      Neuro/Psych  (+) psychiatric history Depression  GI/Hepatic/Renal/Endo    (+) thyroid problem hypothyroidism    Musculoskeletal     Abdominal    Substance History      OB/GYN          Other      history of cancer                Anesthesia Plan    ASA 2     MAC       Anesthetic plan, risks, benefits, and alternatives have been provided, discussed and informed consent has been obtained with: patient.    CODE STATUS:

## 2023-11-27 NOTE — H&P
"[facility]  Pre Procedure History & Physical    Chief Complaint:   Time for my colonoscopy    Subjective     HPI:   68 y.o. male  and  with a personal history of colonic polyps. His dad had colon polyps. He last had a colonoscopy in 11/20.    Past Medical History:   Past Medical History:   Diagnosis Date    Allergic rhinitis     Basal cell carcinoma     followed by Dermatology, Dr. Lopez    Colon polyp     Depression     Frozen shoulder     Hypercholesteremia     Hypertension     Hypothyroidism     Peyronie's disease     Vitamin D deficiency        Family History:  Family History   Problem Relation Age of Onset    Colon polyps Father     Kidney disease Mother     Alcohol abuse Mother     Heart disease Maternal Uncle        Social History:   reports that he has never smoked. He has never used smokeless tobacco. He reports current alcohol use of about 2.0 standard drinks of alcohol per week. He reports that he does not use drugs.    Medications:   No medications prior to admission.       Allergies:  Patient has no known allergies.    ROS:    Pertinent items are noted in HPI     Objective     Blood pressure 114/68, pulse 64, temperature 98.6 °F (37 °C), temperature source Oral, resp. rate 16, height 170.2 cm (67\"), weight 74.8 kg (165 lb), SpO2 99%.    Physical Exam   Constitutional: Pt is oriented to person, place, and time and well-developed, well-nourished, and in no distress.   HENT:   Mouth/Throat: Oropharynx is clear and moist.   Neck: Normal range of motion. Neck supple.   Cardiovascular: Normal rate, regular rhythm and normal heart sounds.    Pulmonary/Chest: Effort normal and breath sounds normal. No respiratory distress. No  wheezes.   Abdominal: Soft. Bowel sounds are normal.   Skin: Skin is warm and dry.   Psychiatric: Mood, memory, affect and judgment normal.     Assessment & Plan     Diagnosis:  68 y.o. male  and  with a personal history of " colonic polyps. His dad had colon polyps. He last had a colonoscopy in 11/20.    Anticipated Surgical Procedure:  Colonoscopy    The risks, benefits, and alternatives of this procedure have been discussed with the patient or the responsible party- the patient understands and agrees to proceed.    Juno Steven M.D.

## 2023-11-28 LAB
LAB AP CASE REPORT: NORMAL
PATH REPORT.FINAL DX SPEC: NORMAL
PATH REPORT.GROSS SPEC: NORMAL

## 2023-12-01 NOTE — PROGRESS NOTES
12/01/23       Please tell him that the colon polyp that we removed was not cancerous but was precancerous.  I recommend that he have a repeat colonoscopy in 5 years.  Please send a copy of this report to his PCP.  Rima green

## 2023-12-04 ENCOUNTER — TELEPHONE (OUTPATIENT)
Dept: GASTROENTEROLOGY | Facility: CLINIC | Age: 68
End: 2023-12-04
Payer: MEDICARE

## 2023-12-04 NOTE — TELEPHONE ENCOUNTER
Vm left for pt to call back and or check his my chart    Hm and cs recall placed for 11/27/28    Results routed to PCP via epic

## 2023-12-04 NOTE — TELEPHONE ENCOUNTER
----- Message from Juno Steven MD sent at 12/1/2023  7:55 AM EST -----  12/01/23       Please tell him that the colon polyp that we removed was not cancerous but was precancerous.  I recommend that he have a repeat colonoscopy in 5 years.  Please send a copy of this report to his PCP.  Thx. kjh   Please let daughter know that if things are really busy when she tries to make appointment, she can leave a message for me and I will try to squeeze them in.

## 2023-12-07 ENCOUNTER — OFFICE VISIT (OUTPATIENT)
Dept: INTERNAL MEDICINE | Facility: CLINIC | Age: 68
End: 2023-12-07
Payer: MEDICARE

## 2023-12-07 VITALS
HEART RATE: 74 BPM | DIASTOLIC BLOOD PRESSURE: 68 MMHG | HEIGHT: 67 IN | WEIGHT: 169 LBS | BODY MASS INDEX: 26.53 KG/M2 | SYSTOLIC BLOOD PRESSURE: 116 MMHG

## 2023-12-07 DIAGNOSIS — Z12.5 PROSTATE CANCER SCREENING: ICD-10-CM

## 2023-12-07 DIAGNOSIS — I10 ESSENTIAL HYPERTENSION: Primary | Chronic | ICD-10-CM

## 2023-12-07 DIAGNOSIS — Z23 NEED FOR INFLUENZA VACCINATION: ICD-10-CM

## 2023-12-07 DIAGNOSIS — E03.9 HYPOTHYROIDISM, UNSPECIFIED TYPE: Chronic | ICD-10-CM

## 2023-12-07 DIAGNOSIS — Z00.00 MEDICARE ANNUAL WELLNESS VISIT, SUBSEQUENT: ICD-10-CM

## 2023-12-07 PROBLEM — E78.2 MIXED HYPERLIPIDEMIA: Status: ACTIVE | Noted: 2017-08-17

## 2023-12-07 NOTE — PROGRESS NOTES
The ABCs of the Annual Wellness Visit  Subsequent Medicare Wellness Visit    Subjective    Arash Roman is a 68 y.o. male who presents for a Subsequent Medicare Wellness Visit.    The following portions of the patient's history were reviewed and   updated as appropriate: allergies, current medications, past family history, past medical history, past social history, past surgical history, and problem list.    Compared to one year ago, the patient feels his physical   health is the same.    Compared to one year ago, the patient feels his mental   health is the same.    Recent Hospitalizations:  He was not admitted to the hospital during the last year.       Current Medical Providers:  Patient Care Team:  Layla Siddiqui MD as PCP - General (Internal Medicine)  Juno Steven MD as Consulting Physician (Gastroenterology)    Outpatient Medications Prior to Visit   Medication Sig Dispense Refill    Azelastine-Fluticasone 137-50 MCG/ACT suspension 1 spray into each nostril daily. (Patient taking differently: 1 spray into the nostril(s) as directed by provider Daily As Needed.) 23 g 11    cetirizine (zyrTEC) 10 MG tablet Take 1 tablet by mouth Daily.      cholecalciferol (VITAMIN D3) 1000 units tablet Take 1 tablet by mouth Daily.      levothyroxine (SYNTHROID, LEVOTHROID) 88 MCG tablet TAKE 1 TABLET BY MOUTH DAILY 90 tablet 1    lisinopril (PRINIVIL,ZESTRIL) 10 MG tablet TAKE 1 TABLET BY MOUTH DAILY 90 tablet 1    rosuvastatin (CRESTOR) 5 MG tablet TAKE 1 TABLET BY MOUTH DAILY 90 tablet 1     No facility-administered medications prior to visit.       No opioid medication identified on active medication list. I have reviewed chart for other potential  high risk medication/s and harmful drug interactions in the elderly.        Aspirin is not on active medication list.  Aspirin use is not indicated based on review of current medical condition/s. Risk of harm outweighs potential benefits.  .    Patient Active Problem List  "  Diagnosis    Hypothyroidism    Mixed hyperlipidemia    Depression    Peyronie's disease    Adhesive capsulitis of left shoulder    Allergic rhinitis    Vitamin D deficiency    Colon polyp    Family history of colon cancer    Essential hypertension     Advance Care Planning   Advance Care Planning     Advance Directive is not on file.  ACP discussion was held with the patient during this visit. Patient has an advance directive (not in EMR), copy requested.     Objective    Vitals:    23 1408   BP: 116/68   Pulse: 74   Weight: 76.7 kg (169 lb)   Height: 170.2 cm (67\")     Estimated body mass index is 26.47 kg/m² as calculated from the following:    Height as of this encounter: 170.2 cm (67\").    Weight as of this encounter: 76.7 kg (169 lb).    BMI is >= 25 and <30. (Overweight) The following options were offered after discussion;: exercise counseling/recommendations and nutrition counseling/recommendations      Does the patient have evidence of cognitive impairment? No    Lab Results   Component Value Date    CHLPL 171 2023    TRIG 99 2023    HDL 52 2023     (H) 2023    VLDL 18 2023        HEALTH RISK ASSESSMENT    Smoking Status:  Social History     Tobacco Use   Smoking Status Never   Smokeless Tobacco Never     Alcohol Consumption:  Social History     Substance and Sexual Activity   Alcohol Use Yes    Alcohol/week: 2.0 standard drinks of alcohol    Types: 1 Glasses of wine, 1 Cans of beer per week    Comment: socially, 1-3 x per week     Fall Risk Screen:    ALESSANDRA Fall Risk Assessment was completed, and patient is at LOW risk for falls.Assessment completed on:2023    Depression Screenin/7/2023     2:10 PM   PHQ-2/PHQ-9 Depression Screening   Little Interest or Pleasure in Doing Things 0-->not at all   Feeling Down, Depressed or Hopeless 0-->not at all   PHQ-9: Brief Depression Severity Measure Score 0       Health Habits and Functional and Cognitive " Screenin/7/2023     2:09 PM   Functional & Cognitive Status   Do you have difficulty preparing food and eating? No   Do you have difficulty bathing yourself, getting dressed or grooming yourself? No   Do you have difficulty using the toilet? No   Do you have difficulty moving around from place to place? No   Do you have trouble with steps or getting out of a bed or a chair? No   Current Diet Well Balanced Diet   Dental Exam Up to date   Eye Exam Up to date   Exercise (times per week) 0 times per week   Current Exercises Include No Regular Exercise   Do you need help using the phone?  No   Are you deaf or do you have serious difficulty hearing?  No   Do you need help to go to places out of walking distance? No   Do you need help shopping? No   Do you need help preparing meals?  No   Do you need help with housework?  No   Do you need help with laundry? No   Do you need help taking your medications? No   Do you need help managing money? No   Do you ever drive or ride in a car without wearing a seat belt? No   Have you felt unusual stress, anger or loneliness in the last month? No   Who do you live with? Spouse   If you need help, do you have trouble finding someone available to you? No   Have you been bothered in the last four weeks by sexual problems? No       Age-appropriate Screening Schedule:  Refer to the list below for future screening recommendations based on patient's age, sex and/or medical conditions. Orders for these recommended tests are listed in the plan section. The patient has been provided with a written plan.    Health Maintenance   Topic Date Due    ZOSTER VACCINE (3 of 3) 2019    COVID-19 Vaccine ( season) 2023    ANNUAL WELLNESS VISIT  2023    LIPID PANEL  2024    BMI FOLLOWUP  2024    TDAP/TD VACCINES (3 - Td or Tdap) 2028    COLORECTAL CANCER SCREENING  2028    HEPATITIS C SCREENING  Completed    INFLUENZA VACCINE  Completed     "Pneumococcal Vaccine 65+  Completed                  CMS Preventative Services Quick Reference  Risk Factors Identified During Encounter  Immunizations Discussed/Encouraged: COVID19  The above risks/problems have been discussed with the patient.  Pertinent information has been shared with the patient in the After Visit Summary.  An After Visit Summary and PPPS were made available to the patient.    Follow Up:   Next Medicare Wellness visit to be scheduled in 1 year.       Additional E&M Note during same encounter follows:  Patient has multiple medical problems which are significant and separately identifiable that require additional work above and beyond the Medicare Wellness Visit.      Chief Complaint  Hypertension    Subjective        HPI  Arash Roman is also being seen today for hypertension, elevated chol.  Taking care of wife with Alzheimer's - does have caretakers.          Objective   Vital Signs:  /68   Pulse 74   Ht 170.2 cm (67\")   Wt 76.7 kg (169 lb)   BMI 26.47 kg/m²     Physical Exam  Constitutional:       Appearance: Normal appearance.   Cardiovascular:      Rate and Rhythm: Normal rate and regular rhythm.   Pulmonary:      Effort: Pulmonary effort is normal.          The following data was reviewed by: Layla Siddiqui MD on 12/07/2023:  Common labs          11/30/2023    13:28   Common Labs   Glucose 92    BUN 18    Creatinine 1.13    Sodium 142    Potassium 4.3    Chloride 105    Calcium 9.8    Total Protein 6.5    Albumin 4.9    Total Bilirubin 0.3    Alkaline Phosphatase 64    AST (SGOT) 30    ALT (SGPT) 30    WBC 4.29    Hemoglobin 14.1    Hematocrit 40.9    Platelets 166    Total Cholesterol 171    Triglycerides 99    HDL Cholesterol 52    LDL Cholesterol  101    PSA 2.860                 Assessment and Plan   Diagnoses and all orders for this visit:    1. Essential hypertension (Primary)  Comments:  controlled, no change.  Orders:  -     Comprehensive Metabolic Panel; Future  -   "   Lipid Panel With / Chol / HDL Ratio; Future    2. Prostate cancer screening  Comments:  numbers remain good. annual f/u  Orders:  -     PSA Screen; Future    3. Hypothyroidism, unspecified type  Comments:  TSH at goal.  Orders:  -     TSH; Future    4. Need for influenza vaccination  -     Fluzone High-Dose 65+yrs    5. Medicare annual wellness visit, subsequent  Comments:  Leland fairy well, encouraged regular exercise- vaccines reviewed- Shingrix and RSV recommended.             Follow Up   Return in about 1 year (around 12/7/2024) for Medicare Wellness, Lab Before FUP.  Patient was given instructions and counseling regarding his condition or for health maintenance advice. Please see specific information pulled into the AVS if appropriate.

## 2024-01-17 ENCOUNTER — OFFICE VISIT (OUTPATIENT)
Dept: INTERNAL MEDICINE | Facility: CLINIC | Age: 69
End: 2024-01-17
Payer: MEDICARE

## 2024-01-17 VITALS — HEIGHT: 67 IN | BODY MASS INDEX: 27 KG/M2 | WEIGHT: 172 LBS

## 2024-01-17 DIAGNOSIS — M54.17 LUMBOSACRAL RADICULOPATHY AT L5: Primary | ICD-10-CM

## 2024-01-17 PROCEDURE — 99214 OFFICE O/P EST MOD 30 MIN: CPT | Performed by: INTERNAL MEDICINE

## 2024-01-17 RX ORDER — MELOXICAM 15 MG/1
15 TABLET ORAL DAILY
Qty: 30 TABLET | Refills: 0 | Status: SHIPPED | OUTPATIENT
Start: 2024-01-17

## 2024-01-17 NOTE — PROGRESS NOTES
"Chief Complaint  left hip/leg pain    Subjective        Arash Roman presents to [facility]  History of Present Illness low back stiffness and pain intermittently for years.  He has done PT in the past which helped with the back but didn't have the radicular sx. He is now having pain going down L lateral leg- painful to walk, taking steps one at a time leading with R leg. No trauma.   Can get comfortable if he gets flat.   Tylenol didn't help much.  No nsaids.     Objective   Vital Signs:  Ht 170.2 cm (67\")   Wt 78 kg (172 lb)   BMI 26.94 kg/m²   Estimated body mass index is 26.94 kg/m² as calculated from the following:    Height as of this encounter: 170.2 cm (67\").    Weight as of this encounter: 78 kg (172 lb).               Physical Exam  Constitutional:       Appearance: Normal appearance.   Cardiovascular:      Rate and Rhythm: Normal rate and regular rhythm.   Musculoskeletal:         General: No swelling or tenderness.      Right lower leg: No edema.      Left lower leg: No edema.   Neurological:      Motor: No weakness.      Deep Tendon Reflexes: Reflexes normal.        Result Review :                     Assessment and Plan     Diagnoses and all orders for this visit:    1. Lumbosacral radiculopathy at L5 (Primary)  Comments:  start with nsaid/PT- imaging if fails to improve. Can take Tylenol for breakthru pain, no other nsaids (ibuprofen/Aleve)  Orders:  -     Ambulatory Referral to Physical Therapy Evaluate and treat    Other orders  -     meloxicam (MOBIC) 15 MG tablet; Take 1 tablet by mouth Daily.  Dispense: 30 tablet; Refill: 0      Further MDM pending response to the above.        Follow Up     No follow-ups on file.  Patient was given instructions and counseling regarding his condition or for health maintenance advice. Please see specific information pulled into the AVS if appropriate.         "

## 2024-01-19 ENCOUNTER — DOCUMENTATION (OUTPATIENT)
Dept: INTERNAL MEDICINE | Facility: CLINIC | Age: 69
End: 2024-01-19
Payer: MEDICARE

## 2024-01-19 RX ORDER — METHYLPREDNISOLONE 4 MG/1
TABLET ORAL
Qty: 1 EACH | Refills: 0 | Status: SHIPPED | OUTPATIENT
Start: 2024-01-19

## 2024-01-23 DIAGNOSIS — M54.17 LUMBOSACRAL RADICULOPATHY AT L5: Primary | ICD-10-CM

## 2024-01-26 ENCOUNTER — OFFICE VISIT (OUTPATIENT)
Dept: SPORTS MEDICINE | Facility: CLINIC | Age: 69
End: 2024-01-26
Payer: MEDICARE

## 2024-01-26 ENCOUNTER — PATIENT ROUNDING (BHMG ONLY) (OUTPATIENT)
Dept: SPORTS MEDICINE | Facility: CLINIC | Age: 69
End: 2024-01-26
Payer: MEDICARE

## 2024-01-26 VITALS
OXYGEN SATURATION: 98 % | BODY MASS INDEX: 26.37 KG/M2 | DIASTOLIC BLOOD PRESSURE: 68 MMHG | WEIGHT: 168 LBS | RESPIRATION RATE: 16 BRPM | HEIGHT: 67 IN | TEMPERATURE: 98 F | SYSTOLIC BLOOD PRESSURE: 122 MMHG | HEART RATE: 77 BPM

## 2024-01-26 DIAGNOSIS — M47.816 SPONDYLOSIS OF LUMBAR SPINE: ICD-10-CM

## 2024-01-26 DIAGNOSIS — M54.50 LUMBAR PAIN: ICD-10-CM

## 2024-01-26 DIAGNOSIS — M51.36 DEGENERATIVE DISC DISEASE, LUMBAR: ICD-10-CM

## 2024-01-26 DIAGNOSIS — M54.16 LEFT LUMBAR RADICULITIS: Primary | ICD-10-CM

## 2024-01-26 NOTE — PROGRESS NOTES
January 26, 2024    A Update[employer] Message has been sent to the patient for PATIENT ROUNDING with [facility]

## 2024-01-26 NOTE — PROGRESS NOTES
"Arash is a 68 y.o. year old male presents to [facility]    Chief Complaint   Patient presents with    Back Pain     Increased  lower back pain for several weeks. Complains of stiffness, and radiating pain, and paresthesia down his left leg. Just finished a round of steroids that has helped some, PT has helped in the past.         History of Present Illness  Self-referral for chronic back pain, acute left-sided sciatica.  He states that he has had chronic back pain for greater than 10 years.  His previous family physician had recommended home exercises as well as anti-inflammatories which at the time had helped.  He however has been dealing with daily pain, stiffness.  More acutely over the past 3 weeks, he has had acute pain that radiates down the left leg at times into the calf.  No bowel or bladder incontinence, no foot drop.  He states the pain was severe to the point that he could not sit comfortably to use the bathroom.  He has been taking oral anti-inflammatory but more recently completed Medrol Dosepak which did help with his radicular pain.  PT is pending next week but he is inquiring about more definitive etiology to his pain.  No imaging has been performed to date.    I have reviewed the patient's medical, family, and social history in detail and updated the computerized patient record.    /68 (BP Location: Right arm, Patient Position: Sitting, Cuff Size: Adult)   Pulse 77   Temp 98 °F (36.7 °C)   Resp 16   Ht 170.2 cm (67\")   Wt 76.2 kg (168 lb)   SpO2 98%   BMI 26.31 kg/m²      Physical Exam    Vital signs reviewed.   General: No acute distress.  Eyes: conjunctiva clear; pupils equally round and reactive  ENT: external ears atraumatic  CV: no peripheral edema  Resp: normal respiratory effort, no use of accessory muscles  Skin: no rashes or wounds; normal turgor  Psych: mood and affect appropriate; recent and remote memory intact  Neuro: sensation to light " touch intact    MSK Exam  Lumbar spine demonstrates no paraspinal tenderness.  Pain is worsened with extension greater than flexion of the lumbar spine.  Negative seated slump test bilateral.  1+ DTR L4, S1 bilateral    Lumbar Spine X-Ray  Indication: Pain  Views: AP and lateral    Findings:  No fracture  No bony lesion  Normal soft tissues  Multilevel degenerative disc disease, most notable at 3-4 where there is bridging spondylosis of the anterior vertebral bodies.    No prior studies were available for comparison.               Diagnoses and all orders for this visit:    Left lumbar radiculitis  -     MRI Lumbar Spine Without Contrast; Future    Lumbar pain  -     XR Spine Lumbar 2 or 3 View  -     MRI Lumbar Spine Without Contrast; Future    Degenerative disc disease, lumbar  -     MRI Lumbar Spine Without Contrast; Future    Spondylosis of lumbar spine  -     MRI Lumbar Spine Without Contrast; Future      Discussed in detail his exam and x-ray findings.  Pending PT which I think is prudent.  He has had positive response to steroid.  I lengthy discussion with patient regarding the etiology to his pain and the challenges that sometimes degenerative processes present.  Also explained that there are times where some of these things cannot be prevented.  He does have significant amount of spondylosis, bridging spondylosis on x-ray and chronic pain and for these reasons, I am proceeding with MRI of lumbar spine.  I will notify patient of results.        Follow Up     No follow-ups on file.    Patient was given instructions and counseling regarding his condition or for health maintenance advice. Please see specific information pulled into the AVS if appropriate.     EMR Dragon/Transcription disclaimer:    Much of this encounter note is an electronic transcription/translation of spoken language to printed text.  The electronic translation of spoken language may permit erroneous, or at times, nonsensical words or  phrases to be inadvertently transcribed.  Although I have reviewed the note for such errors some may still exist.

## 2024-01-31 ENCOUNTER — HOSPITAL ENCOUNTER (OUTPATIENT)
Dept: PHYSICAL THERAPY | Facility: HOSPITAL | Age: 69
Discharge: HOME | End: 2024-01-31
Admitting: INTERNAL MEDICINE
Payer: MEDICARE

## 2024-01-31 DIAGNOSIS — G89.29 CHRONIC MIDLINE LOW BACK PAIN WITH LEFT-SIDED SCIATICA: Primary | ICD-10-CM

## 2024-01-31 DIAGNOSIS — M25.662 DECREASED RANGE OF MOTION OF BOTH LOWER EXTREMITIES: ICD-10-CM

## 2024-01-31 DIAGNOSIS — M25.661 DECREASED RANGE OF MOTION OF BOTH LOWER EXTREMITIES: ICD-10-CM

## 2024-01-31 DIAGNOSIS — M54.42 CHRONIC MIDLINE LOW BACK PAIN WITH LEFT-SIDED SCIATICA: Primary | ICD-10-CM

## 2024-01-31 DIAGNOSIS — M25.69 DECREASED ROM OF TRUNK AND BACK: ICD-10-CM

## 2024-01-31 PROCEDURE — 97161 PT EVAL LOW COMPLEX 20 MIN: CPT

## 2024-01-31 PROCEDURE — 97110 THERAPEUTIC EXERCISES: CPT

## 2024-01-31 NOTE — THERAPY EVALUATION
Outpatient Physical Therapy Ortho Initial Evaluation  [facility]     Patient Name: Arash Roman  : 1955  MRN: [id number]  Today's Date: 2024      Visit Date: 2024    Patient Active Problem List   Diagnosis    Hypothyroidism    Mixed hyperlipidemia    Depression    Peyronie's disease    Adhesive capsulitis of left shoulder    Allergic rhinitis    Vitamin D deficiency    Colon polyp    Family history of colon cancer    Essential hypertension        Past Medical History:   Diagnosis Date    Allergic rhinitis     Basal cell carcinoma     followed by Dermatology, Dr. Lopez    Colon polyp     Depression     Frozen shoulder     Hypercholesteremia     Hypertension     Hypothyroidism     Peyronie's disease     Vitamin D deficiency         Past Surgical History:   Procedure Laterality Date    COLONOSCOPY N/A 2016    Procedure: COLONOSCOPY to cecum/TI; polypectomy(cold bx);  Surgeon: Juno Steven MD;  Location: [facility] ENDOSCOPY;  Service:     COLONOSCOPY N/A 10/15/2018    Procedure: COLONOSCOPY TO CECUM/TI WITH POLYPECTOMY (COLD BX);  Surgeon: Juno Steven MD;  Location: [facility] ENDOSCOPY;  Service: Gastroenterology    COLONOSCOPY N/A 2020    Procedure: COLONOSCOPY to cecum and TI:;  Surgeon: Juno Steven MD;  Location: [facility] ENDOSCOPY;  Service: Gastroenterology;  Laterality: N/A;  family hx colon cancer, personal hx colon polyps  post:  externa; hemorrhoids,     COLONOSCOPY N/A 2023    Procedure: COLONOSCOPY TO CECUM/TI WITH COLD SNARE POLYPECTOMY;  Surgeon: Juno Steven MD;  Location: [facility] ENDOSCOPY;  Service: Gastroenterology;  Laterality: N/A;  pre: PERSONAL HX OF COLON POLYPS, FAMILY HX OF COLON CANCER  post: POLYP, INTERNAL HEMORRHOIDS    TONSILLECTOMY         Visit Dx:     ICD-10-CM ICD-9-CM   1. Chronic midline low back pain with left-sided sciatica  M54.42 724.2    G89.29 724.3     338.29   2. Decreased range of motion of both lower extremities  M25.661 719.56     M25.662    3. Decreased ROM of trunk and back  M25.69 719.59          Patient History       Row Name 01/31/24 1000             History    Chief Complaint Pain  -MO      Type of Pain Back pain  -MO      Date Current Problem(s) Began --  chronic  -MO      Brief Description of Current Complaint 69 y/o male pt reports to therapy with c/o chronic LBP with worsening L side radicular pain. He has been to therapy ~2 years ago, felt decent results while working with therapy however had little roll over following discharge. For the last 3 months, pt has had sharp, shooting pain down the L side of his hip, to knee, and down his leg. He reports it is so sharp it stops him in his track. He does state that it had started to resolve in December however returned at the beginning of January. He has not had that pain for the last couple of days. The shooting pain is aggravated by lifting his leg to get into the car, navigating steps, and sitting for long periods of time. His low back pain is aggravated by any heavy lifting, mowing his yard, and sleeping on his back. He reports that pain is alleviated some by doing exercises in the morning. He has N/T into the L leg that will go down to the knee, denies any symptoms in his feet however he does state that has some instances of the shooting pain down into his lateral calf on the left side. Additionally of note, pt reports he has occasional heel pain when first waking in the morning. He is very active these days and is ready to make a HEP part of his daily routine to help with maintenance of the pain.  -MO      Patient/Caregiver Goals Relieve pain;Improve mobility;Know what to do to help the symptoms  -MO      Occupation/sports/leisure activities Is a , standing for several hours at a time  -MO         Pain     Pain Location Back;Hip  -MO      Pain at Present 2  -MO      Pain at Best 2  -MO      Pain at Worst 9  -MO      Pain Frequency Constant/continuous;Intermittent   luciana low back pain, intermitent sharp, shooting pain into the legs  -MO      Pain Description Shooting;Sharp  -MO      Is your sleep disturbed? Yes  -MO         Fall Risk Assessment    Any falls in the past year: No  -MO         Services    Prior Rehab/Home Health Experiences No  -MO         Daily Activities    Primary Language English  -MO      Are you able to read Yes  -MO      Are you able to write Yes  -MO      How does patient learn best? Listening;Reading;Demonstration  -MO      Does patient have problems with the following? None  -MO      Barriers to learning None  -MO      Pt Participated in POC and Goals Yes  -MO         Safety    Are you being hurt, hit, or frightened by anyone at home or in your life? No  -MO      Are you being neglected by a caregiver No  -MO      Have you had any of the following issues with N/A  -MO                User Key  (r) = Recorded By, (t) = Taken By, (c) = Cosigned By      Initials Name Provider Type    Alie Briceño, PT Physical Therapist                     PT Ortho       Row Name 01/31/24 1000       Quarter Clearing    Quarter Clearing Lower Quarter Clearing  -MO       Neural Tension Signs- Lower Quarter Clearing    SLR Bilateral:;Positive  -MO    Prone knee flexion Bilateral:;Positive  -MO       Myotomal Screen- Lower Quarter Clearing    Hip flexion (L2) Bilateral:;4+ (Good +)  -MO    Knee extension (L3) Left:;4 (Good);Right:;4+ (Good +)  -MO    Ankle DF (L4) Bilateral:;5 (Normal)  -MO    Knee flexion (S2) Left:;4 (Good);Right:;4+ (Good +)  -MO       Lumbar ROM Screen- Lower Quarter Clearing    Lumbar Flexion Impaired  to knees, c/o tension in HS  -MO    Lumbar Extension Impaired  50%  -MO    Lumbar Lateral Flexion Impaired  to mid thigh  -MO    Lumbar Rotation Impaired  30% impaired BL  -MO       Special Tests/Palpation    Special Tests/Palpation Lumbar/SI  -MO       Lumbosacral Accessory Motions    Lumbosacral Accessory Motions Tested? Yes  -MO       Lumbosacral  Palpation    Lumbosacral Palpation? Yes  -MO    Gluteus Yonathan Tender  -MO    Quadratus Lumborum Guarded/taut  -MO    Erector Spinae (Paraspinals) Guarded/taut;Tender  -MO    Hamstring Bilateral:;Guarded/taut  -MO       General ROM    GENERAL ROM COMMENTS Significant HS restrictions BL- actives ~45% knee ext in 90/90 testing position  -MO       MMT (Manual Muscle Testing)    Rt Lower Ext Rt Hip ABduction;Rt Hip Extension  -MO    Lt Lower Ext Lt Hip ABduction;Lt Hip Extension  -MO       MMT Right Lower Ext    Rt Hip Extension MMT, Gross Movement (4/5) good  -MO    Rt Hip ABduction MMT, Gross Movement (4-/5) good minus  -MO       MMT Left Lower Ext    Lt Hip Extension MMT, Gross Movement (4/5) good  -MO    Lt Hip ABduction MMT, Gross Movement (4-/5) good minus  -MO       Sensation    Sensation WNL? WNL  -MO              User Key  (r) = Recorded By, (t) = Taken By, (c) = Cosigned By      Initials Name Provider Type    Alie Briceño, PT Physical Therapist                                Therapy Education  Education Details: Educated on PT role and POC; discussed expectations/timeframe for healing. Provided HEP, Access Code: [email or web address]  Given: HEP, Symptoms/condition management  Program: New  How Provided: Verbal, Demonstration, Written  Provided to: Patient  Level of Understanding: Teach back education performed, Verbalized, Demonstrated      PT OP Goals       Row Name 01/31/24 1800          PT Short Term Goals    STG Date to Achieve 03/01/24  -MO     STG 1 Pt. will be independent with initial HEP to improve self-management of condition.  -MO     STG 1 Progress New  -MO     STG 2 Pt. will demonstrate proper body mechanics with forward bending/lifting activities to preserve lumbar spine with functional activities.  -MO     STG 2 Progress New  -MO     STG 3 Pt will report >/= 25% improvement in overall symptoms for improved QOL.  -MO     STG 3 Progress New  -MO        Long Term Goals    LTG Date to Achieve 03/31/24  -MO      LTG 1 Pt. will be independent with advanced HEP to improve long term independence with self management of condition.  -MO     LTG 1 Progress New  -MO     LTG 2 Pt. will score </= 35%( from 52% at evaluation) on Modified Oswestry to indicate improved perception of disability.  -MO     LTG 2 Progress New  -MO     LTG 3 Pt. will demonstrate proper TrA engagement in standing and dynamic movements to improve lumbopelvic stability.  -MO     LTG 3 Progress New  -MO     LTG 4 Pt will improve BL gross LE strength >/=4+/5 for improved participation in recreational activities  -MO     LTG 4 Progress New  -MO     LTG 5 Pt will report <2 instances of shooting pain within 2 week time period, demoing improved LE flexibiltiy and overall improved QOL.  -MO     LTG 5 Progress New  -MO        Time Calculation    PT Goal Re-Cert Due Date 04/30/24  -MO               User Key  (r) = Recorded By, (t) = Taken By, (c) = Cosigned By      Initials Name Provider Type    Alie Briceño, PT Physical Therapist                     PT Assessment/Plan       Row Name 01/31/24 1800          PT Assessment    Functional Limitations Impaired locomotion;Limitations in community activities;Performance in leisure activities;Performance in self-care ADL;Performance in work activities  -MO     Impairments Impaired flexibility;Posture;Poor body mechanics;Pain;Muscle strength;Locomotion;Range of motion  -MO     Assessment Comments Arash Roman is a 68 y.o. male referred to physical therapy chronic LBP w/ lumbosacral radiculopathy at L5 and L sided sciatica. He presents with an evolving clinical presentation, along with no remarkable comorbidities and personal factors of chronicity of symptoms  that may impact his progress in the plan of care. Pt presents today with BL strength deficits, trunk ROM restrictions in all directions with pain at end range, tenderness to palpation along BL paraspinals, QL, and glues, + neural tension BL, as well as  significant BL LE tightness including hamstrings and calves. His signs and symptoms are consistent with referring diagnosis as well as potentially plantar fasciitis secondary to significant tightness throughout LEs. Patients Oswestry outcome measure is 52%, ranging from 0-100 with 100% being severe disability. Pt will benefit from skilled PT to address the previous impairments and return to PLOF.  -MO     Please refer to paper survey for additional self-reported information No  -MO     Rehab Potential Good  -MO     Patient/caregiver participated in establishment of treatment plan and goals Yes  -MO     Patient would benefit from skilled therapy intervention Yes  -MO        PT Plan    PT Frequency 2x/week  -MO     Predicted Duration of Therapy Intervention (PT) 12 visits  -MO     Planned CPT's? PT EVAL LOW COMPLEXITY: 93294;PT RE-EVAL: 07709;PT THER PROC EA 15 MIN: 05594;PT THER ACT EA 15 MIN: 56804;PT MANUAL THERAPY EA 15 MIN: 31320;PT NEUROMUSC RE-EDUCATION EA 15 MIN: 69327;PT GAIT TRAINING EA 15 MIN: 60635;PT SELF CARE/HOME MGMT/TRAIN EA 15: 06355;PT HOT OR COLD PACK TREAT MCARE;PT ELECTRICAL STIM UNATTEND: ;PT ELECTRICAL STIM ATTD EA 15 MIN: 17142;PT ULTRASOUND EA 15 MIN: 29505;PT TRACTION LUMBAR: 22782  -MO     PT Plan Comments nustep, review initial exercises and add reps/hold times if tolerable. Standing HS stretch, calf stretch off the steps, PPT with TrA, swiss ball roll out, wall washes, bridge, lateral walking with mini squat, standing hip ext, STS with TrA activation. Needing to target deep core and posterior chain musculature however need to stress stretching secondary to patients significant tightness. Additionally he wants to be more educated on his MRI results, review them if it is in the chart and explain how our current POC can help improve symptoms and not worsen anything- could bring out spine model and show were issues are and educate on increasing disc spacing, importance of deep core and  hip strength  -MO               User Key  (r) = Recorded By, (t) = Taken By, (c) = Cosigned By      Initials Name Provider Type    Alie Briceño PT Physical Therapist                       OP Exercises       Row Name 01/31/24 1800             Total Minutes    53164 - PT Therapeutic Exercise Minutes 8  -MO      24909 - OT Therapeutic Exercise Minutes 8  -MO      54465 - PT Manual Therapy Minutes 5  -MO         Exercise 2    Exercise Name 2 LTR  -MO      Cueing 2 Verbal  -MO      Sets 2 1 BL  -MO      Reps 2 15  -MO         Exercise 3    Exercise Name 3 figure 4/ piriformis stretch  -MO      Cueing 3 Verbal  -MO      Sets 3 2e BL  -MO      Time 3 20s  -MO                User Key  (r) = Recorded By, (t) = Taken By, (c) = Cosigned By      Initials Name Provider Type    Alie Briceño PT Physical Therapist                  Manual Rx (last 36 hours)       Manual Treatments       Row Name 01/31/24 1800             Total Minutes    28388 - PT Manual Therapy Minutes 5  -MO                User Key  (r) = Recorded By, (t) = Taken By, (c) = Cosigned By      Initials Name Provider Type    Alie Briceño PT Physical Therapist                                Outcome Measure Options: Modified Oswestry  Modified Oswestry  Modified Oswestry Score/Comments: 26/50- 52%      Time Calculation:     Start Time: 1015  Stop Time: 1100  Time Calculation (min): 45 min  Timed Charges  56560 - PT Therapeutic Exercise Minutes: 8  61346 - PT Manual Therapy Minutes: 5  Untimed Charges  PT Eval/Re-eval Minutes: 32  Total Minutes  Timed Charges Total Minutes: 13  Untimed Charges Total Minutes: 32   Total Minutes: 45     Therapy Charges for Today       Code Description Service Date Service Provider Modifiers Qty    64601234680 HC PT THER PROC EA 15 MIN 1/31/2024 Alie Smith, PT GP 1    97950308192 HC PT EVAL LOW COMPLEXITY 2 1/31/2024 Alie Smith, PT GP 1            PT G-Codes  Outcome Measure Options: Modified Oswestry  Modified Oswestry  Score/Comments: 26/50- 52%         Alie Smith, PT  1/31/2024

## 2024-02-01 ENCOUNTER — TELEPHONE (OUTPATIENT)
Dept: INTERNAL MEDICINE | Facility: CLINIC | Age: 69
End: 2024-02-01
Payer: MEDICARE

## 2024-02-01 NOTE — TELEPHONE ENCOUNTER
Caller: Arash Roman    Relationship: Self    Best call back number: [phone]    What medication are you requesting: ANTIBIOTIC SOMETHING FOR SYMPTOMS    What are your current symptoms: SEVERE DEEP COUGH, NASAL DRIP AND NOSE BLEED    How long have you been experiencing symptoms: 1 DAY    Have you had these symptoms before:    [x] Yes  [] No    Have you been treated for these symptoms before:   [x] Yes  [] No    If a prescription is needed, what is your preferred pharmacy and phone number: [id number] [address] AT [facility] [phone]  - [phone]      Additional notes: PATIENT STATED IT FEELS LIKE AN UPPER RESPIRATORY INFECTION AND IS REQUESTING TO HAVE MEDICATIONS TO HELP RELIEVE THE SYMPTOMS.

## 2024-02-06 ENCOUNTER — HOSPITAL ENCOUNTER (OUTPATIENT)
Dept: PHYSICAL THERAPY | Facility: HOSPITAL | Age: 69
Setting detail: THERAPIES SERIES
Discharge: HOME | End: 2024-02-06
Payer: MEDICARE

## 2024-02-06 DIAGNOSIS — M25.69 DECREASED ROM OF TRUNK AND BACK: ICD-10-CM

## 2024-02-06 DIAGNOSIS — M25.662 DECREASED RANGE OF MOTION OF BOTH LOWER EXTREMITIES: ICD-10-CM

## 2024-02-06 DIAGNOSIS — M25.661 DECREASED RANGE OF MOTION OF BOTH LOWER EXTREMITIES: ICD-10-CM

## 2024-02-06 DIAGNOSIS — G89.29 CHRONIC MIDLINE LOW BACK PAIN WITH LEFT-SIDED SCIATICA: Primary | ICD-10-CM

## 2024-02-06 DIAGNOSIS — M54.42 CHRONIC MIDLINE LOW BACK PAIN WITH LEFT-SIDED SCIATICA: Primary | ICD-10-CM

## 2024-02-06 PROCEDURE — 97110 THERAPEUTIC EXERCISES: CPT

## 2024-02-06 NOTE — THERAPY TREATMENT NOTE
Outpatient Physical Therapy Ortho Treatment Note  [facility]     Patient Name: Arash Roman  : 1955  MRN: [id number]  Today's Date: 2024      Visit Date: 2024    Visit Dx:    ICD-10-CM ICD-9-CM   1. Chronic midline low back pain with left-sided sciatica  M54.42 724.2    G89.29 724.3     338.29   2. Decreased range of motion of both lower extremities  M25.661 719.56    M25.662    3. Decreased ROM of trunk and back  M25.69 719.59       Patient Active Problem List   Diagnosis    Hypothyroidism    Mixed hyperlipidemia    Depression    Peyronie's disease    Adhesive capsulitis of left shoulder    Allergic rhinitis    Vitamin D deficiency    Colon polyp    Family history of colon cancer    Essential hypertension        Past Medical History:   Diagnosis Date    Allergic rhinitis     Basal cell carcinoma     followed by Dermatology, Dr. Lopez    Colon polyp     Depression     Frozen shoulder     Hypercholesteremia     Hypertension     Hypothyroidism     Peyronie's disease     Vitamin D deficiency         Past Surgical History:   Procedure Laterality Date    COLONOSCOPY N/A 2016    Procedure: COLONOSCOPY to cecum/TI; polypectomy(cold bx);  Surgeon: Juno Steven MD;  Location: [facility] ENDOSCOPY;  Service:     COLONOSCOPY N/A 10/15/2018    Procedure: COLONOSCOPY TO CECUM/TI WITH POLYPECTOMY (COLD BX);  Surgeon: Juno Steven MD;  Location: [facility] ENDOSCOPY;  Service: Gastroenterology    COLONOSCOPY N/A 2020    Procedure: COLONOSCOPY to cecum and TI:;  Surgeon: Juno Steven MD;  Location: [facility] ENDOSCOPY;  Service: Gastroenterology;  Laterality: N/A;  family hx colon cancer, personal hx colon polyps  post:  externa; hemorrhoids,     COLONOSCOPY N/A 2023    Procedure: COLONOSCOPY TO CECUM/TI WITH COLD SNARE POLYPECTOMY;  Surgeon: Juno Steven MD;  Location: [facility] ENDOSCOPY;  Service: Gastroenterology;  Laterality: N/A;  pre: PERSONAL HX OF COLON POLYPS, FAMILY HX OF COLON  CANCER  post: POLYP, INTERNAL HEMORRHOIDS    TONSILLECTOMY                          PT Assessment/Plan       Row Name 02/06/24 1200          PT Assessment    Assessment Comments Mr. Roman returns to PT for first f/u since evaluation for L5 radiculpathy and LBP. He developed an URTI, which caused him to not do much of his HEP and increased some back pain. Today, began on nustep then reviewed HEP. Initiated multiple flexibility exercises today to target tightness in B LE, including standing TTN, stair hamtring and calf stretch, SB roll out. Also, began core strengthening with adding PPT and bridges, which pt was able to eliminate the pain in back once fixing form of PPT. Updatd HEP and reviewed frequency. At this time, pt will benefit from skilled PT.  -DR        PT Plan    PT Plan Comments assess pt tolerance to last visit, any questions with updated HEP? consider adding s/l clamshell, side steps with mini squat, standing hip ext, STS with TrA.  -               User Key  (r) = Recorded By, (t) = Taken By, (c) = Cosigned By      Initials Name Provider Type    Roosevelt Medrano, PT Physical Therapist                       OP Exercises       Row Name 02/06/24 1200             Subjective    Subjective Comments After I left from the evaluation, I came down with a pretty rough respiratory infection which set me back a bit. I haven't done much of my program.  -DR         Total Minutes    59787 - PT Therapeutic Exercise Minutes 44  -DR         Exercise 1    Exercise Name 1 nustep  -DR      Cueing 1 Verbal  -DR      Time 1 5 min; lvl 5  -DR         Exercise 2    Exercise Name 2 LTR  -DR      Cueing 2 Verbal  -DR      Sets 2 1 BL  -DR      Reps 2 15  -DR      Time 2 3s  -DR         Exercise 3    Exercise Name 3 figure 4/ piriformis stretch  -DR      Cueing 3 Verbal  -DR      Reps 3 3e B  -DR      Time 3 20s  -DR         Exercise 4    Exercise Name 4 standing HS/calf stretch on steps  -DR      Cueing 4 Verbal;Demo  -    "   Reps 4 3e  -DR      Time 4 20s  -DR         Exercise 5    Exercise Name 5 SB roll out  -DR      Cueing 5 Verbal;Demo  -DR      Reps 5 10e \"w\"  -DR      Time 5 2-3s  -DR         Exercise 6    Exercise Name 6 standing TTN  -DR      Cueing 6 Verbal;Demo  -DR      Reps 6 15e  -DR         Exercise 7    Exercise Name 7 PPT with TrA  -DR      Cueing 7 Verbal;Demo  -DR      Reps 7 15  -DR      Time 7 5s  -DR      Additional Comments cues to avoid bracing and using UE to assist  -DR         Exercise 8    Exercise Name 8 supine bridge with PPT  -DR      Cueing 8 Verbal;Demo  -DR      Sets 8 2  -DR      Reps 8 10  -DR      Time 8 2s  -DR                User Key  (r) = Recorded By, (t) = Taken By, (c) = Cosigned By      Initials Name Provider Type    Roosevelt Medrano, PT Physical Therapist                                  PT OP Goals       Row Name 02/06/24 1200          PT Short Term Goals    STG Date to Achieve 03/01/24  -     STG 1 Pt. will be independent with initial HEP to improve self-management of condition.  -     STG 1 Progress Ongoing  -     STG 2 Pt. will demonstrate proper body mechanics with forward bending/lifting activities to preserve lumbar spine with functional activities.  -     STG 2 Progress Ongoing  -     STG 3 Pt will report >/= 25% improvement in overall symptoms for improved QOL.  -     STG 3 Progress Ongoing  -        Long Term Goals    LTG Date to Achieve 03/31/24  -     LTG 1 Pt. will be independent with advanced HEP to improve long term independence with self management of condition.  -     LTG 1 Progress Ongoing  -     LTG 2 Pt. will score </= 35%( from 52% at evaluation) on Modified Oswestry to indicate improved perception of disability.  -     LTG 2 Progress Ongoing  -     LTG 3 Pt. will demonstrate proper TrA engagement in standing and dynamic movements to improve lumbopelvic stability.  -     LTG 3 Progress Ongoing  -     LTG 4 Pt will improve BL gross LE " strength >/=4+/5 for improved participation in recreational activities  -DR RUFFG 4 Progress Ongoing  -     LTG 5 Pt will report <2 instances of shooting pain within 2 week time period, demoing improved LE flexibiltiy and overall improved QOL.  -DR NAVARRO 5 Progress Ongoing  -               User Key  (r) = Recorded By, (t) = Taken By, (c) = Cosigned By      Initials Name Provider Type    Roosevelt Medrano, PT Physical Therapist                    Therapy Education  Education Details: updated HEP and reviewed frequency; discussed importance of mobility to prevent LBP  Given: HEP, Symptoms/condition management  Program: Reinforced, Progressed  How Provided: Verbal, Demonstration, Written  Provided to: Patient  Level of Understanding: Teach back education performed, Verbalized, Demonstrated              Time Calculation:   Start Time: 1229  Stop Time: 1313  Time Calculation (min): 44 min  Timed Charges  27231 - PT Therapeutic Exercise Minutes: 44  Total Minutes  Timed Charges Total Minutes: 44   Total Minutes: 44  Therapy Charges for Today       Code Description Service Date Service Provider Modifiers Qty    12425888164 HC PT THER PROC EA 15 MIN 2/6/2024 Roosevelt Barfield, PT GP 3                      Roosevelt Barfield, DWIGHT  2/6/2024

## 2024-02-13 ENCOUNTER — HOSPITAL ENCOUNTER (OUTPATIENT)
Dept: PHYSICAL THERAPY | Facility: HOSPITAL | Age: 69
Setting detail: THERAPIES SERIES
Discharge: HOME | End: 2024-02-13
Payer: MEDICARE

## 2024-02-13 DIAGNOSIS — G89.29 CHRONIC MIDLINE LOW BACK PAIN WITHOUT SCIATICA: ICD-10-CM

## 2024-02-13 DIAGNOSIS — M54.42 CHRONIC MIDLINE LOW BACK PAIN WITH LEFT-SIDED SCIATICA: Primary | ICD-10-CM

## 2024-02-13 DIAGNOSIS — G89.29 CHRONIC MIDLINE LOW BACK PAIN WITH LEFT-SIDED SCIATICA: Primary | ICD-10-CM

## 2024-02-13 DIAGNOSIS — M53.86 DECREASED RANGE OF MOTION OF LUMBAR SPINE: ICD-10-CM

## 2024-02-13 DIAGNOSIS — M25.661 DECREASED RANGE OF MOTION OF BOTH LOWER EXTREMITIES: ICD-10-CM

## 2024-02-13 DIAGNOSIS — M54.50 CHRONIC MIDLINE LOW BACK PAIN WITHOUT SCIATICA: ICD-10-CM

## 2024-02-13 DIAGNOSIS — M25.69 DECREASED ROM OF TRUNK AND BACK: ICD-10-CM

## 2024-02-13 DIAGNOSIS — M25.662 DECREASED RANGE OF MOTION OF BOTH LOWER EXTREMITIES: ICD-10-CM

## 2024-02-13 PROCEDURE — 97110 THERAPEUTIC EXERCISES: CPT

## 2024-02-15 DIAGNOSIS — E03.9 HYPOTHYROIDISM, UNSPECIFIED TYPE: ICD-10-CM

## 2024-02-15 DIAGNOSIS — E78.5 HYPERLIPIDEMIA, UNSPECIFIED HYPERLIPIDEMIA TYPE: ICD-10-CM

## 2024-02-15 DIAGNOSIS — I10 ESSENTIAL HYPERTENSION: ICD-10-CM

## 2024-02-16 ENCOUNTER — HOSPITAL ENCOUNTER (OUTPATIENT)
Dept: PHYSICAL THERAPY | Facility: HOSPITAL | Age: 69
Setting detail: THERAPIES SERIES
Discharge: HOME | End: 2024-02-16
Payer: MEDICARE

## 2024-02-16 DIAGNOSIS — M25.69 DECREASED ROM OF TRUNK AND BACK: ICD-10-CM

## 2024-02-16 DIAGNOSIS — M25.661 DECREASED RANGE OF MOTION OF BOTH LOWER EXTREMITIES: ICD-10-CM

## 2024-02-16 DIAGNOSIS — G89.29 CHRONIC MIDLINE LOW BACK PAIN WITHOUT SCIATICA: ICD-10-CM

## 2024-02-16 DIAGNOSIS — M25.662 DECREASED RANGE OF MOTION OF BOTH LOWER EXTREMITIES: ICD-10-CM

## 2024-02-16 DIAGNOSIS — M54.50 CHRONIC MIDLINE LOW BACK PAIN WITHOUT SCIATICA: ICD-10-CM

## 2024-02-16 DIAGNOSIS — M54.42 CHRONIC MIDLINE LOW BACK PAIN WITH LEFT-SIDED SCIATICA: Primary | ICD-10-CM

## 2024-02-16 DIAGNOSIS — G89.29 CHRONIC MIDLINE LOW BACK PAIN WITH LEFT-SIDED SCIATICA: Primary | ICD-10-CM

## 2024-02-16 DIAGNOSIS — M53.86 DECREASED RANGE OF MOTION OF LUMBAR SPINE: ICD-10-CM

## 2024-02-16 PROCEDURE — 97110 THERAPEUTIC EXERCISES: CPT

## 2024-02-16 RX ORDER — LEVOTHYROXINE SODIUM 88 UG/1
88 TABLET ORAL DAILY
Qty: 90 TABLET | Refills: 1 | Status: SHIPPED | OUTPATIENT
Start: 2024-02-16

## 2024-02-16 RX ORDER — ROSUVASTATIN CALCIUM 5 MG/1
5 TABLET, COATED ORAL DAILY
Qty: 90 TABLET | Refills: 1 | Status: SHIPPED | OUTPATIENT
Start: 2024-02-16

## 2024-02-16 RX ORDER — LISINOPRIL 10 MG/1
10 TABLET ORAL DAILY
Qty: 90 TABLET | Refills: 1 | Status: SHIPPED | OUTPATIENT
Start: 2024-02-16

## 2024-02-20 ENCOUNTER — APPOINTMENT (OUTPATIENT)
Dept: PHYSICAL THERAPY | Facility: HOSPITAL | Age: 69
End: 2024-02-20
Payer: MEDICARE

## 2024-02-23 ENCOUNTER — HOSPITAL ENCOUNTER (OUTPATIENT)
Dept: PHYSICAL THERAPY | Facility: HOSPITAL | Age: 69
Setting detail: THERAPIES SERIES
Discharge: HOME | End: 2024-02-23
Payer: MEDICARE

## 2024-02-23 DIAGNOSIS — G89.29 CHRONIC MIDLINE LOW BACK PAIN WITH LEFT-SIDED SCIATICA: Primary | ICD-10-CM

## 2024-02-23 DIAGNOSIS — M25.661 DECREASED RANGE OF MOTION OF BOTH LOWER EXTREMITIES: ICD-10-CM

## 2024-02-23 DIAGNOSIS — M53.86 DECREASED RANGE OF MOTION OF LUMBAR SPINE: ICD-10-CM

## 2024-02-23 DIAGNOSIS — M25.662 DECREASED RANGE OF MOTION OF BOTH LOWER EXTREMITIES: ICD-10-CM

## 2024-02-23 DIAGNOSIS — M25.69 DECREASED ROM OF TRUNK AND BACK: ICD-10-CM

## 2024-02-23 DIAGNOSIS — M54.50 CHRONIC MIDLINE LOW BACK PAIN WITHOUT SCIATICA: ICD-10-CM

## 2024-02-23 DIAGNOSIS — M54.42 CHRONIC MIDLINE LOW BACK PAIN WITH LEFT-SIDED SCIATICA: Primary | ICD-10-CM

## 2024-02-23 DIAGNOSIS — G89.29 CHRONIC MIDLINE LOW BACK PAIN WITHOUT SCIATICA: ICD-10-CM

## 2024-02-23 PROCEDURE — 97110 THERAPEUTIC EXERCISES: CPT

## 2024-02-23 NOTE — THERAPY TREATMENT NOTE
Outpatient Physical Therapy Ortho Treatment Note  [facility]     Patient Name: Arash Roman  : 1955  MRN: [id number]  Today's Date: 2024      Visit Date: 2024    Visit Dx:    ICD-10-CM ICD-9-CM   1. Chronic midline low back pain with left-sided sciatica  M54.42 724.2    G89.29 724.3     338.29   2. Decreased range of motion of both lower extremities  M25.661 719.56    M25.662    3. Decreased ROM of trunk and back  M25.69 719.59   4. Chronic midline low back pain without sciatica  M54.50 724.2    G89.29 338.29   5. Decreased range of motion of lumbar spine  M53.86 724.9       Patient Active Problem List   Diagnosis    Hypothyroidism    Mixed hyperlipidemia    Depression    Peyronie's disease    Adhesive capsulitis of left shoulder    Allergic rhinitis    Vitamin D deficiency    Colon polyp    Family history of colon cancer    Essential hypertension        Past Medical History:   Diagnosis Date    Allergic rhinitis     Basal cell carcinoma     followed by Dermatology, Dr. Lopez    Colon polyp     Depression     Frozen shoulder     Hypercholesteremia     Hypertension     Hypothyroidism     Peyronie's disease     Vitamin D deficiency         Past Surgical History:   Procedure Laterality Date    COLONOSCOPY N/A 2016    Procedure: COLONOSCOPY to cecum/TI; polypectomy(cold bx);  Surgeon: Juno Steven MD;  Location: [facility] ENDOSCOPY;  Service:     COLONOSCOPY N/A 10/15/2018    Procedure: COLONOSCOPY TO CECUM/TI WITH POLYPECTOMY (COLD BX);  Surgeon: Juno Steven MD;  Location: [facility] ENDOSCOPY;  Service: Gastroenterology    COLONOSCOPY N/A 2020    Procedure: COLONOSCOPY to cecum and TI:;  Surgeon: Juno Steven MD;  Location: [facility] ENDOSCOPY;  Service: Gastroenterology;  Laterality: N/A;  family hx colon cancer, personal hx colon polyps  post:  externa; hemorrhoids,     COLONOSCOPY N/A 2023    Procedure: COLONOSCOPY TO CECUM/TI WITH COLD SNARE POLYPECTOMY;  Surgeon: Tenisha  MD Juno;  Location: [facility] ENDOSCOPY;  Service: Gastroenterology;  Laterality: N/A;  pre: PERSONAL HX OF COLON POLYPS, FAMILY HX OF COLON CANCER  post: POLYP, INTERNAL HEMORRHOIDS    TONSILLECTOMY                          PT Assessment/Plan       Row Name 02/23/24 1246          PT Assessment    Assessment Comments Mr. Roman returns to the clinic following a week absence from therapy due to having to cancel most recent appointment. The patient reports that his pain level has been much improved this week with no shooting pain into his leg. He continues to report stiffness in the back but states that it has not been severe by any means. He continues to demonstrate progression within therapy. Kept POC the same this date with plans to continue progression next visit. He remains a good candidate for therapy at this time.  -ZB        PT Plan    PT Plan Comments Did he perform HEP over weekend? Consider progressing hip extension strengthening, patient did well with qped hip extension with minimal activation of lumbar mm  -ZB               User Key  (r) = Recorded By, (t) = Taken By, (c) = Cosigned By      Initials Name Provider Type    Castro Mattson, PT Physical Therapist                       OP Exercises       Row Name 02/23/24 1100             Subjective    Subjective Comments I've felt good this week, a little stiffness in my back but no shooting pain and no pain like what brought me in.  -ZB         Total Minutes    65993 - PT Therapeutic Exercise Minutes 40  -ZB         Exercise 1    Exercise Name 1 nustep  -ZB      Cueing 1 Verbal  -ZB      Time 1 5 min; lvl 5  -ZB         Exercise 2    Exercise Name 2 LTR  -ZB      Cueing 2 Verbal  -ZB      Sets 2 1 BL  -ZB      Reps 2 10  -ZB      Time 2 3s  -ZB         Exercise 3    Exercise Name 3 figure 4/ piriformis stretch  -ZB      Cueing 3 Verbal  -ZB      Reps 3 3e B  -ZB      Time 3 20s  -ZB         Exercise 4    Exercise Name 4 standing HS/calf stretch on steps   -ZB      Cueing 4 Verbal;Demo  -ZB      Reps 4 3e  -ZB      Time 4 20s  -ZB         Exercise 7    Exercise Name 7 PPT with TrA  -ZB      Cueing 7 Verbal;Demo  -ZB      Reps 7 15  -ZB      Time 7 5s  -ZB      Additional Comments cues to avoid bracing and using UE to assist  -ZB         Exercise 8    Exercise Name 8 supine bridge with PPT  -ZB      Cueing 8 Verbal;Demo  -ZB      Sets 8 2  -ZB      Reps 8 10  -ZB      Time 8 2s  -ZB         Exercise 9    Exercise Name 9 s/l clamshells  -ZB      Cueing 9 Verbal  -ZB      Sets 9 2B  -ZB      Reps 9 10  -ZB      Time 9 RTB  -ZB         Exercise 10    Exercise Name 10 side steps + mini squat  -ZB      Cueing 10 Verbal;Demo  -ZB      Reps 10 3 laps  -ZB      Time 10 RTB  -ZB         Exercise 11    Exercise Name 11 standing hip abduction  -ZB      Cueing 11 Verbal;Demo  -ZB      Sets 11 2e  -ZB      Reps 11 10  -ZB         Exercise 12    Exercise Name 12 LAQ + adduction squeeze  -ZB      Cueing 12 Verbal  -ZB      Reps 12 2B  -ZB      Time 12 10  -ZB         Exercise 13    Exercise Name 13 STS with TrA  -ZB      Cueing 13 Verbal  -ZB      Sets 13 2  -ZB      Reps 13 10  -ZB      Time 13 low blue stationary mat table  -ZB                User Key  (r) = Recorded By, (t) = Taken By, (c) = Cosigned By      Initials Name Provider Type    ZB Castro Fitzgerald, PT Physical Therapist                                  PT OP Goals       Row Name 02/23/24 1200          PT Short Term Goals    STG Date to Achieve 03/01/24  -ZB     STG 1 Pt. will be independent with initial HEP to improve self-management of condition.  -ZB     STG 1 Progress Ongoing  -ZB     STG 1 Progress Comments Inconsistent completion since update due to busy schedule recently.  -ZB     STG 2 Pt. will demonstrate proper body mechanics with forward bending/lifting activities to preserve lumbar spine with functional activities.  -ZB     STG 2 Progress Ongoing  -ZB     STG 3 Pt will report >/= 25% improvement in overall  symptoms for improved QOL.  -ZB     STG 3 Progress Partially Met  -ZB        Long Term Goals    LTG Date to Achieve 03/31/24  -ZB     LTG 1 Pt. will be independent with advanced HEP to improve long term independence with self management of condition.  -ZB     LTG 1 Progress Ongoing  -ZB     LTG 2 Pt. will score </= 35%( from 52% at evaluation) on Modified Oswestry to indicate improved perception of disability.  -ZB     LTG 2 Progress Ongoing  -ZB     LTG 3 Pt. will demonstrate proper TrA engagement in standing and dynamic movements to improve lumbopelvic stability.  -ZB     LTG 3 Progress Ongoing  -ZB     LTG 4 Pt will improve BL gross LE strength >/=4+/5 for improved participation in recreational activities  -ZB     LTG 4 Progress Ongoing  -ZB     LTG 5 Pt will report <2 instances of shooting pain within 2 week time period, demoing improved LE flexibiltiy and overall improved QOL.  -ZB     LTG 5 Progress Ongoing  -ZB               User Key  (r) = Recorded By, (t) = Taken By, (c) = Cosigned By      Initials Name Provider Type    Castro Mattson, PT Physical Therapist                                   Time Calculation:   Start Time: 1130  Stop Time: 1210  Time Calculation (min): 40 min  Timed Charges  95305 - PT Therapeutic Exercise Minutes: 40  Total Minutes  Timed Charges Total Minutes: 40   Total Minutes: 40  Therapy Charges for Today       Code Description Service Date Service Provider Modifiers Qty    40048014597 HC PT THER PROC EA 15 MIN 2/23/2024 Castro Fitzgerald, PT GP 3                      Andrew Fitzgerald PT  2/23/2024

## 2024-02-27 ENCOUNTER — HOSPITAL ENCOUNTER (OUTPATIENT)
Dept: PHYSICAL THERAPY | Facility: HOSPITAL | Age: 69
Setting detail: THERAPIES SERIES
Discharge: HOME | End: 2024-02-27
Payer: MEDICARE

## 2024-02-27 DIAGNOSIS — M53.86 DECREASED RANGE OF MOTION OF LUMBAR SPINE: ICD-10-CM

## 2024-02-27 DIAGNOSIS — M25.661 DECREASED RANGE OF MOTION OF BOTH LOWER EXTREMITIES: ICD-10-CM

## 2024-02-27 DIAGNOSIS — M25.662 DECREASED RANGE OF MOTION OF BOTH LOWER EXTREMITIES: ICD-10-CM

## 2024-02-27 DIAGNOSIS — G89.29 CHRONIC MIDLINE LOW BACK PAIN WITHOUT SCIATICA: ICD-10-CM

## 2024-02-27 DIAGNOSIS — M54.50 CHRONIC MIDLINE LOW BACK PAIN WITHOUT SCIATICA: ICD-10-CM

## 2024-02-27 DIAGNOSIS — M54.42 CHRONIC MIDLINE LOW BACK PAIN WITH LEFT-SIDED SCIATICA: Primary | ICD-10-CM

## 2024-02-27 DIAGNOSIS — M25.69 DECREASED ROM OF TRUNK AND BACK: ICD-10-CM

## 2024-02-27 DIAGNOSIS — G89.29 CHRONIC MIDLINE LOW BACK PAIN WITH LEFT-SIDED SCIATICA: Primary | ICD-10-CM

## 2024-02-27 PROCEDURE — 97110 THERAPEUTIC EXERCISES: CPT

## 2024-02-27 NOTE — THERAPY TREATMENT NOTE
Outpatient Physical Therapy Ortho Treatment Note  [facility]     Patient Name: Arash Roman  : 1955  MRN: [id number]  Today's Date: 2024      Visit Date: 2024    Visit Dx:    ICD-10-CM ICD-9-CM   1. Chronic midline low back pain with left-sided sciatica  M54.42 724.2    G89.29 724.3     338.29   2. Decreased range of motion of both lower extremities  M25.661 719.56    M25.662    3. Decreased ROM of trunk and back  M25.69 719.59   4. Chronic midline low back pain without sciatica  M54.50 724.2    G89.29 338.29   5. Decreased range of motion of lumbar spine  M53.86 724.9       Patient Active Problem List   Diagnosis    Hypothyroidism    Mixed hyperlipidemia    Depression    Peyronie's disease    Adhesive capsulitis of left shoulder    Allergic rhinitis    Vitamin D deficiency    Colon polyp    Family history of colon cancer    Essential hypertension        Past Medical History:   Diagnosis Date    Allergic rhinitis     Basal cell carcinoma     followed by Dermatology, Dr. Lopez    Colon polyp     Depression     Frozen shoulder     Hypercholesteremia     Hypertension     Hypothyroidism     Peyronie's disease     Vitamin D deficiency         Past Surgical History:   Procedure Laterality Date    COLONOSCOPY N/A 2016    Procedure: COLONOSCOPY to cecum/TI; polypectomy(cold bx);  Surgeon: Juno Steven MD;  Location: [facility] ENDOSCOPY;  Service:     COLONOSCOPY N/A 10/15/2018    Procedure: COLONOSCOPY TO CECUM/TI WITH POLYPECTOMY (COLD BX);  Surgeon: Juno Steven MD;  Location: [facility] ENDOSCOPY;  Service: Gastroenterology    COLONOSCOPY N/A 2020    Procedure: COLONOSCOPY to cecum and TI:;  Surgeon: Juno Steven MD;  Location: [facility] ENDOSCOPY;  Service: Gastroenterology;  Laterality: N/A;  family hx colon cancer, personal hx colon polyps  post:  externa; hemorrhoids,     COLONOSCOPY N/A 2023    Procedure: COLONOSCOPY TO CECUM/TI WITH COLD SNARE POLYPECTOMY;  Surgeon: Tenisha  MD Juno;  Location: [facility] ENDOSCOPY;  Service: Gastroenterology;  Laterality: N/A;  pre: PERSONAL HX OF COLON POLYPS, FAMILY HX OF COLON CANCER  post: POLYP, INTERNAL HEMORRHOIDS    TONSILLECTOMY                          PT Assessment/Plan       Row Name 02/27/24 1300          PT Assessment    Assessment Comments Mr. Roman returns to PT today reporting some excess soreness after an event he attended Sunday, but continues to do his HEP to try to assist. Continues to report no radiating symptoms at this time. Pt has great form with all exercises, engaging core and TrA appropriately with minimal LBP when completing exercises. If he does  feel his low back kick in, he is able to rest and reactivate appropriately to make symptoms subside. Added monster walks and sidelyinig hip abduction with great form and correct muscle activation. Pt remains a great candidate for skilled PT at this time.  -DR        PT Plan    PT Plan Comments attempt prone hip ext, step ups with drivers? update  HEP for weekend.  -DR               User Key  (r) = Recorded By, (t) = Taken By, (c) = Cosigned By      Initials Name Provider Type    Roosevelt Medrano, PT Physical Therapist                       OP Exercises       Row Name 02/27/24 1200             Subjective    Subjective Comments More stiff this morning than anything.  -DR         Total Minutes    24277 - PT Therapeutic Exercise Minutes 43  -DR         Exercise 1    Exercise Name 1 nustep  -DR      Cueing 1 Verbal  -DR      Time 1 5 min; lvl 5  -DR         Exercise 2    Exercise Name 2 LTR  -DR      Cueing 2 Verbal  -DR      Sets 2 1 BL  -DR      Reps 2 10  -DR      Time 2 3s  -DR         Exercise 3    Exercise Name 3 figure 4/ piriformis stretch  -DR      Cueing 3 Verbal  -DR      Reps 3 3e B  -DR      Time 3 20s  -DR         Exercise 4    Exercise Name 4 standing HS/calf stretch on steps  -DR      Cueing 4 Verbal;Demo  -DR      Reps 4 3e  -DR      Time 4 20s  -DR         Exercise  5    Exercise Name 5 supine march with PPT  -DR      Cueing 5 Verbal;Demo  -DR      Sets 5 2  -DR      Reps 5 10  -DR         Exercise 6    Exercise Name 6 s/l hip abduction  -DR      Cueing 6 Verbal;Demo  -DR      Sets 6 2e  -DR      Reps 6 10  -DR         Exercise 7    Exercise Name 7 PPT with TrA  -DR      Cueing 7 Verbal;Demo  -DR      Reps 7 10  -DR      Time 7 5s  -DR         Exercise 8    Exercise Name 8 supine bridge with PPT  -DR      Cueing 8 Verbal;Demo  -DR      Sets 8 2  -DR      Reps 8 10  -DR      Time 8 2s  -DR      Additional Comments RTB  -DR         Exercise 10    Exercise Name 10 side steps + mini squat  -DR      Cueing 10 Verbal;Demo  -DR      Reps 10 4 laps  -DR      Time 10 RTB  -DR         Exercise 14    Exercise Name 14 monster walks  -DR      Cueing 14 Verbal;Demo  -DR      Reps 14 3 laps  -DR      Time 14 RTB  -DR      Additional Comments f/b  -DR                User Key  (r) = Recorded By, (t) = Taken By, (c) = Cosigned By      Initials Name Provider Type    Roosevelt Medrano, PT Physical Therapist                                  PT OP Goals       Row Name 02/27/24 1200          PT Short Term Goals    STG Date to Achieve 03/01/24  -     STG 1 Pt. will be independent with initial HEP to improve self-management of condition.  -     STG 1 Progress Ongoing  -     STG 2 Pt. will demonstrate proper body mechanics with forward bending/lifting activities to preserve lumbar spine with functional activities.  -     STG 2 Progress Ongoing  -     STG 3 Pt will report >/= 25% improvement in overall symptoms for improved QOL.  -     STG 3 Progress Partially Met  -DR        Long Term Goals    LTG Date to Achieve 03/31/24  -     LTG 1 Pt. will be independent with advanced HEP to improve long term independence with self management of condition.  -     LTG 1 Progress Ongoing  -     LTG 2 Pt. will score </= 35%( from 52% at evaluation) on Modified Oswestry to indicate improved  perception of disability.  -DR RUFFG 2 Progress Ongoing  -DR RUFFG 3 Pt. will demonstrate proper TrA engagement in standing and dynamic movements to improve lumbopelvic stability.  -DR RUFFG 3 Progress Ongoing  -DR RUFFG 4 Pt will improve BL gross LE strength >/=4+/5 for improved participation in recreational activities  -DR RUFFG 4 Progress Ongoing  -     LTG 5 Pt will report <2 instances of shooting pain within 2 week time period, demoing improved LE flexibiltiy and overall improved QOL.  -DR RUFFG 5 Progress Ongoing  -               User Key  (r) = Recorded By, (t) = Taken By, (c) = Cosigned By      Initials Name Provider Type    Roosevelt Medrano, PT Physical Therapist                    Therapy Education  Education Details: correct technique for TrA engagment  Given: HEP, Symptoms/condition management  Program: Reinforced  How Provided: Verbal, Demonstration  Provided to: Patient  Level of Understanding: Verbalized, Demonstrated, Teach back education performed              Time Calculation:   Start Time: 1231  Stop Time: 1314  Time Calculation (min): 43 min  Timed Charges  03822 - PT Therapeutic Exercise Minutes: 43  Total Minutes  Timed Charges Total Minutes: 43   Total Minutes: 43  Therapy Charges for Today       Code Description Service Date Service Provider Modifiers Qty    78665536922 HC PT THER PROC EA 15 MIN 2/27/2024 Roosevelt Barfield, PT GP 3                      Roosevelt Barfield, DWIGHT  2/27/2024

## 2024-03-01 ENCOUNTER — APPOINTMENT (OUTPATIENT)
Dept: PHYSICAL THERAPY | Facility: HOSPITAL | Age: 69
End: 2024-03-01
Payer: MEDICARE

## 2024-03-05 ENCOUNTER — HOSPITAL ENCOUNTER (OUTPATIENT)
Dept: PHYSICAL THERAPY | Facility: HOSPITAL | Age: 69
Setting detail: THERAPIES SERIES
Discharge: HOME | End: 2024-03-05
Payer: MEDICARE

## 2024-03-05 DIAGNOSIS — M54.42 CHRONIC MIDLINE LOW BACK PAIN WITH LEFT-SIDED SCIATICA: Primary | ICD-10-CM

## 2024-03-05 DIAGNOSIS — M25.69 DECREASED ROM OF TRUNK AND BACK: ICD-10-CM

## 2024-03-05 DIAGNOSIS — G89.29 CHRONIC MIDLINE LOW BACK PAIN WITH LEFT-SIDED SCIATICA: Primary | ICD-10-CM

## 2024-03-05 DIAGNOSIS — M25.662 DECREASED RANGE OF MOTION OF BOTH LOWER EXTREMITIES: ICD-10-CM

## 2024-03-05 DIAGNOSIS — M25.661 DECREASED RANGE OF MOTION OF BOTH LOWER EXTREMITIES: ICD-10-CM

## 2024-03-05 DIAGNOSIS — G89.29 CHRONIC MIDLINE LOW BACK PAIN WITHOUT SCIATICA: ICD-10-CM

## 2024-03-05 DIAGNOSIS — M54.50 CHRONIC MIDLINE LOW BACK PAIN WITHOUT SCIATICA: ICD-10-CM

## 2024-03-05 DIAGNOSIS — M53.86 DECREASED RANGE OF MOTION OF LUMBAR SPINE: ICD-10-CM

## 2024-03-05 PROCEDURE — 97110 THERAPEUTIC EXERCISES: CPT

## 2024-03-05 PROCEDURE — 97530 THERAPEUTIC ACTIVITIES: CPT

## 2024-03-05 NOTE — THERAPY PROGRESS REPORT/RE-CERT
Outpatient Physical Therapy Ortho Progress Note  [facility]     Patient Name: Arash Roman  : 1955  MRN: [id number]  Today's Date: 3/5/2024      Visit Date: 2024    Visit Dx:    ICD-10-CM ICD-9-CM   1. Chronic midline low back pain with left-sided sciatica  M54.42 724.2    G89.29 724.3     338.29   2. Decreased range of motion of both lower extremities  M25.661 719.56    M25.662    3. Decreased ROM of trunk and back  M25.69 719.59   4. Chronic midline low back pain without sciatica  M54.50 724.2    G89.29 338.29   5. Decreased range of motion of lumbar spine  M53.86 724.9       Patient Active Problem List   Diagnosis    Hypothyroidism    Mixed hyperlipidemia    Depression    Peyronie's disease    Adhesive capsulitis of left shoulder    Allergic rhinitis    Vitamin D deficiency    Colon polyp    Family history of colon cancer    Essential hypertension        Past Medical History:   Diagnosis Date    Allergic rhinitis     Basal cell carcinoma     followed by Dermatology, Dr. Lopez    Colon polyp     Depression     Frozen shoulder     Hypercholesteremia     Hypertension     Hypothyroidism     Peyronie's disease     Vitamin D deficiency         Past Surgical History:   Procedure Laterality Date    COLONOSCOPY N/A 2016    Procedure: COLONOSCOPY to cecum/TI; polypectomy(cold bx);  Surgeon: Juno Steven MD;  Location: [facility] ENDOSCOPY;  Service:     COLONOSCOPY N/A 10/15/2018    Procedure: COLONOSCOPY TO CECUM/TI WITH POLYPECTOMY (COLD BX);  Surgeon: Juno Steven MD;  Location: [facility] ENDOSCOPY;  Service: Gastroenterology    COLONOSCOPY N/A 2020    Procedure: COLONOSCOPY to cecum and TI:;  Surgeon: Juno Steven MD;  Location: [facility] ENDOSCOPY;  Service: Gastroenterology;  Laterality: N/A;  family hx colon cancer, personal hx colon polyps  post:  externa; hemorrhoids,     COLONOSCOPY N/A 2023    Procedure: COLONOSCOPY TO CECUM/TI WITH COLD SNARE POLYPECTOMY;  Surgeon: Juno Steven  MD;  Location: [facility] ENDOSCOPY;  Service: Gastroenterology;  Laterality: N/A;  pre: PERSONAL HX OF COLON POLYPS, FAMILY HX OF COLON CANCER  post: POLYP, INTERNAL HEMORRHOIDS    TONSILLECTOMY          PT Ortho       Row Name 03/05/24 1100       Myotomal Screen- Lower Quarter Clearing    Knee extension (L3) Bilateral:;4+ (Good +)  -DR    Knee flexion (S2) Bilateral:;4+ (Good +)  -DR       MMT Right Lower Ext    Rt Hip Extension MMT, Gross Movement (4/5) good  -DR    Rt Hip ABduction MMT, Gross Movement (4/5) good  -DR       MMT Left Lower Ext    Lt Hip Extension MMT, Gross Movement (4-/5) good minus  -DR    Lt Hip ABduction MMT, Gross Movement (4/5) good  -DR              User Key  (r) = Recorded By, (t) = Taken By, (c) = Cosigned By      Initials Name Provider Type    Roosevelt Medrano, PT Physical Therapist                                 PT Assessment/Plan       Row Name 03/05/24 1200          PT Assessment    Functional Limitations Impaired locomotion;Limitations in community activities;Performance in leisure activities;Performance in self-care ADL;Performance in work activities  -DR     Impairments Impaired flexibility;Posture;Poor body mechanics;Pain;Muscle strength;Locomotion;Range of motion  -DR     Assessment Comments Arash Roman has been seen for 7physical therapy sessions for lumbar radiculopathy and LBP. Treatment has included therapeutic exercise, therapeutic activity, and patient education with home exercise program . Progress to physical therapy goals is good. Pt has met 2/3 STG and 1.5/5 LTG. Subjectively, pt believes there has been a 20% improvement in his symptoms since beginning PT, demonstrated by diminished shooting pains into his L LE and the severity of pain in the L glute region is much lower. Pt still reports stiffness and pain in his low back, but likely due to chronicity of symptoms. Reports compliance with HEP. Today, introduced step ups with  to emphasize glute activation at  top of movement. Pt demonstrated good form and had good tolerance to the activity. Updated HEP and reviewed thoroughly. Overall, pt continues to demonstrate impaired flexibility, dull pain into L glute, difficulty with bed mobility due to pain, and increased pain with increased activity. He will benefit from continued skilled physical therapy to address remaining impairments and functional limitations.  -     Please refer to paper survey for additional self-reported information No  -DR     Rehab Potential Fair  -DR     Patient/caregiver participated in establishment of treatment plan and goals Yes  -DR     Patient would benefit from skilled therapy intervention Yes  -DR        PT Plan    PT Frequency 1x/week;2x/week  -     Predicted Duration of Therapy Intervention (PT) 6-8 visits  -     Planned CPT's? PT RE-EVAL: 62982;PT THER PROC EA 15 MIN: 16895;PT MANUAL THERAPY EA 15 MIN: 86542;PT THER ACT EA 15 MIN: 38265;PT NEUROMUSC RE-EDUCATION EA 15 MIN: 44013;PT GAIT TRAINING EA 15 MIN: 11603;PT SELF CARE/HOME MGMT/TRAIN EA 15: 45529;PT HOT OR COLD PACK TREAT MCARE  -     PT Plan Comments compliance to updated HEP? tolerance to progressions from last visit? possibly trial lumbar traction with belt or STM to lower back for pain modulation? notable quad tightness last visit.. introduce modified noemi stretch or prone quad stretch with strap? consider adding prone hip extension with emphasis on glute activation, not low back to complete motion.  -               User Key  (r) = Recorded By, (t) = Taken By, (c) = Cosigned By      Initials Name Provider Type    Roosevelt Medrano, PT Physical Therapist                       OP Exercises       Row Name 03/05/24 1200             Subjective    Subjective Comments 20% better.  -         Total Minutes    90312 - PT Therapeutic Exercise Minutes 30  -      93751 - PT Therapeutic Activity Minutes 13  -DR         Exercise 1    Exercise Name 1 nustep  -      Cueing 1  "Verbal  -DR      Time 1 5 min; lvl 5  -DR         Exercise 2    Exercise Name 2 LTR  -DR      Cueing 2 Verbal  -DR      Sets 2 1 BL  -DR      Reps 2 10  -DR      Time 2 3s  -DR         Exercise 3    Exercise Name 3 figure 4/ piriformis stretch  -DR      Cueing 3 Verbal  -DR      Reps 3 3e B  -DR      Time 3 20s  -DR         Exercise 4    Exercise Name 4 standing HS/calf stretch on steps  -DR      Cueing 4 Verbal;Demo  -DR      Reps 4 3e  -DR      Time 4 20s  -DR         Exercise 5    Exercise Name 5 supine march with PPT  -DR      Cueing 5 Verbal;Demo  -DR      Sets 5 2  -DR      Reps 5 10  -DR         Exercise 7    Exercise Name 7 PPT with TrA  -DR      Cueing 7 Verbal;Demo  -DR      Reps 7 10  -DR      Time 7 5s  -DR         Exercise 10    Exercise Name 10 side steps + mini squat  -DR      Cueing 10 Verbal;Demo  -DR      Reps 10 4 laps  -DR      Time 10 RTB  -DR         Exercise 13    Exercise Name 13 step up +   -DR      Cueing 13 Verbal;Demo  -DR      Sets 13 2e  -DR      Reps 13 10  -DR      Time 13 4\" + airex  -DR      Additional Comments fwd only  -DR         Exercise 14    Exercise Name 14 monster walks  -DR      Cueing 14 Verbal;Demo  -DR      Reps 14 4 laps  -DR      Time 14 RTB  -DR      Additional Comments fb  -DR                User Key  (r) = Recorded By, (t) = Taken By, (c) = Cosigned By      Initials Name Provider Type    Roosevelt Medrano, PT Physical Therapist                                  PT OP Goals       Row Name 03/05/24 1100          PT Short Term Goals    STG Date to Achieve 03/01/24  -     STG 1 Pt. will be independent with initial HEP to improve self-management of condition.  -     STG 1 Progress Met  -DR     STG 2 Pt. will demonstrate proper body mechanics with forward bending/lifting activities to preserve lumbar spine with functional activities.  -     STG 2 Progress Met  -DR     STG 3 Pt will report >/= 25% improvement in overall symptoms for improved QOL.  -DR     STG " 3 Progress Partially Met  -DR     STG 3 Progress Comments 20% today  -DR        Long Term Goals    LTG Date to Achieve 03/31/24  -     LTG 1 Pt. will be independent with advanced HEP to improve long term independence with self management of condition.  -     LTG 1 Progress Ongoing  -     LTG 2 Pt. will score </= 35%( from 52% at evaluation) on Modified Oswestry to indicate improved perception of disability.  -     LTG 2 Progress Ongoing  -     LTG 3 Pt. will demonstrate proper TrA engagement in standing and dynamic movements to improve lumbopelvic stability.  -     LTG 3 Progress Ongoing  -     LTG 4 Pt will improve BL gross LE strength >/=4+/5 for improved participation in recreational activities  -     LTG 4 Progress Ongoing;Partially Met  -     LTG 5 Pt will report <2 instances of shooting pain within 2 week time period, demoing improved LE flexibiltiy and overall improved QOL.  -     LTG 5 Progress Met  -               User Key  (r) = Recorded By, (t) = Taken By, (c) = Cosigned By      Initials Name Provider Type    Roosevelt Medrano, PT Physical Therapist                    Therapy Education  Education Details: updated HEP and reviewed frequency  Given: HEP, Symptoms/condition management  Program: Reinforced, Progressed  How Provided: Verbal, Demonstration, Written  Provided to: Patient  Level of Understanding: Teach back education performed, Demonstrated, Verbalized       Modified Oswestry  Modified Oswestry Score/Comments: 24/50- 48%      Time Calculation:   Start Time: 1147  Stop Time: 1230  Time Calculation (min): 43 min  Timed Charges  50446 - PT Therapeutic Exercise Minutes: 30  32698 - PT Therapeutic Activity Minutes: 13  Total Minutes  Timed Charges Total Minutes: 43   Total Minutes: 43  Therapy Charges for Today       Code Description Service Date Service Provider Modifiers Qty    96057562578 HC PT THER PROC EA 15 MIN 3/5/2024 Roosevelt Barfield, PT GP 2    70246568694 HC PT  THERAPEUTIC ACT EA 15 MIN 3/5/2024 Roosevelt Barfield, PT GP 1            PT G-Codes  Modified Oswestry Score/Comments: 24/50- 48%         Roosevelt Barfield, PT  3/5/2024

## 2024-03-08 ENCOUNTER — HOSPITAL ENCOUNTER (OUTPATIENT)
Dept: PHYSICAL THERAPY | Facility: HOSPITAL | Age: 69
Setting detail: THERAPIES SERIES
Discharge: HOME | End: 2024-03-08
Payer: MEDICARE

## 2024-03-08 DIAGNOSIS — G89.29 CHRONIC MIDLINE LOW BACK PAIN WITHOUT SCIATICA: ICD-10-CM

## 2024-03-08 DIAGNOSIS — G89.29 CHRONIC MIDLINE LOW BACK PAIN WITH LEFT-SIDED SCIATICA: Primary | ICD-10-CM

## 2024-03-08 DIAGNOSIS — M53.86 DECREASED RANGE OF MOTION OF LUMBAR SPINE: ICD-10-CM

## 2024-03-08 DIAGNOSIS — M25.661 DECREASED RANGE OF MOTION OF BOTH LOWER EXTREMITIES: ICD-10-CM

## 2024-03-08 DIAGNOSIS — M54.42 CHRONIC MIDLINE LOW BACK PAIN WITH LEFT-SIDED SCIATICA: Primary | ICD-10-CM

## 2024-03-08 DIAGNOSIS — M54.50 CHRONIC MIDLINE LOW BACK PAIN WITHOUT SCIATICA: ICD-10-CM

## 2024-03-08 DIAGNOSIS — M25.69 DECREASED ROM OF TRUNK AND BACK: ICD-10-CM

## 2024-03-08 DIAGNOSIS — M25.662 DECREASED RANGE OF MOTION OF BOTH LOWER EXTREMITIES: ICD-10-CM

## 2024-03-08 PROCEDURE — 97110 THERAPEUTIC EXERCISES: CPT

## 2024-03-08 NOTE — THERAPY TREATMENT NOTE
Outpatient Physical Therapy Ortho Treatment Note  [facility]     Patient Name: Arash Roman  : 1955  MRN: [id number]  Today's Date: 3/8/2024      Visit Date: 2024    Visit Dx:    ICD-10-CM ICD-9-CM   1. Chronic midline low back pain with left-sided sciatica  M54.42 724.2    G89.29 724.3     338.29   2. Decreased range of motion of both lower extremities  M25.661 719.56    M25.662    3. Decreased ROM of trunk and back  M25.69 719.59   4. Chronic midline low back pain without sciatica  M54.50 724.2    G89.29 338.29   5. Decreased range of motion of lumbar spine  M53.86 724.9       Patient Active Problem List   Diagnosis    Hypothyroidism    Mixed hyperlipidemia    Depression    Peyronie's disease    Adhesive capsulitis of left shoulder    Allergic rhinitis    Vitamin D deficiency    Colon polyp    Family history of colon cancer    Essential hypertension        Past Medical History:   Diagnosis Date    Allergic rhinitis     Basal cell carcinoma     followed by Dermatology, Dr. Lopez    Colon polyp     Depression     Frozen shoulder     Hypercholesteremia     Hypertension     Hypothyroidism     Peyronie's disease     Vitamin D deficiency         Past Surgical History:   Procedure Laterality Date    COLONOSCOPY N/A 2016    Procedure: COLONOSCOPY to cecum/TI; polypectomy(cold bx);  Surgeon: Juno Steven MD;  Location: [facility] ENDOSCOPY;  Service:     COLONOSCOPY N/A 10/15/2018    Procedure: COLONOSCOPY TO CECUM/TI WITH POLYPECTOMY (COLD BX);  Surgeon: Juno Steven MD;  Location: [facility] ENDOSCOPY;  Service: Gastroenterology    COLONOSCOPY N/A 2020    Procedure: COLONOSCOPY to cecum and TI:;  Surgeon: Juno Steven MD;  Location: [facility] ENDOSCOPY;  Service: Gastroenterology;  Laterality: N/A;  family hx colon cancer, personal hx colon polyps  post:  externa; hemorrhoids,     COLONOSCOPY N/A 2023    Procedure: COLONOSCOPY TO CECUM/TI WITH COLD SNARE POLYPECTOMY;  Surgeon: Juno Steven  MD;  Location: [facility] ENDOSCOPY;  Service: Gastroenterology;  Laterality: N/A;  pre: PERSONAL HX OF COLON POLYPS, FAMILY HX OF COLON CANCER  post: POLYP, INTERNAL HEMORRHOIDS    TONSILLECTOMY                          PT Assessment/Plan       Row Name 03/08/24 1400          PT Assessment    Assessment Comments Spent additional time working on stretching this date and providing additional education on spinal/posterior chain anatomy in relation to pain and discussion on effects of tight muscles on pelvic mobility and paraspinal spasms. Continued to reinforce pain free pelvic tilting, isolating pelvic movement initiated via core, not paraspinals. Additionally added modified noemi HF stretch off mat table with great tolerance and prone hip ext/glute activation to work on differentiating gltue activation from QL/multifidus. Frequent cueing as he continues to demo overactivation of back musculature however he is able to independently correct and shows good improvement as reps progressed. Updated HEP, will continue to progress as appropriate.  -MO        PT Plan    PT Plan Comments how were new exercises? standing glute activation with towel squeeze, 3way hip  -MO               User Key  (r) = Recorded By, (t) = Taken By, (c) = Cosigned By      Initials Name Provider Type    Alie Briceño, PT Physical Therapist                       OP Exercises       Row Name 03/08/24 1300             Total Minutes    20371 - PT Therapeutic Exercise Minutes 40  -MO         Exercise 1    Exercise Name 1 nustep  -MO      Cueing 1 Verbal  -MO      Time 1 5 min; lvl 5  -MO         Exercise 3    Exercise Name 3 figure 4/ piriformis stretch  -MO      Cueing 3 Verbal  -MO      Reps 3 2e B  -MO      Time 3 20s  -MO         Exercise 5    Exercise Name 5 supine march with PPT w/ alt arm tap  -MO      Cueing 5 Verbal  -MO      Sets 5 2 BL  -MO      Reps 5 10  -MO         Exercise 7    Exercise Name 7 PPT with TrA  -MO      Cueing 7 Verbal  -MO    "   Reps 7 10  -MO      Time 7 5s  -MO         Exercise 11    Exercise Name 11 noemi HF stretch off mat table  -MO      Cueing 11 Verbal;Tactile  -MO      Sets 11 1 BL  -MO      Time 11 2 mins  -MO         Exercise 12    Exercise Name 12 prone hip ext  -MO      Cueing 12 Verbal;Tactile  -MO      Reps 12 1 BL  -MO      Time 12 15  -MO      Additional Comments cueing for glute only, stop  -MO         Exercise 13    Exercise Name 13 step up +   -MO      Cueing 13 Verbal  -MO      Sets 13 1e  -MO      Reps 13 10  -MO      Time 13 4\" + airex  -MO                User Key  (r) = Recorded By, (t) = Taken By, (c) = Cosigned By      Initials Name Provider Type    Alie Briceño, PT Physical Therapist                                  PT OP Goals       Row Name 03/08/24 1400          PT Short Term Goals    STG Date to Achieve 03/01/24  -MO     STG 1 Pt. will be independent with initial HEP to improve self-management of condition.  -MO     STG 1 Progress Met  -MO     STG 2 Pt. will demonstrate proper body mechanics with forward bending/lifting activities to preserve lumbar spine with functional activities.  -MO     STG 2 Progress Met  -MO     STG 3 Pt will report >/= 25% improvement in overall symptoms for improved QOL.  -MO     STG 3 Progress Partially Met  -MO        Long Term Goals    LTG Date to Achieve 03/31/24  -MO     LTG 1 Pt. will be independent with advanced HEP to improve long term independence with self management of condition.  -MO     LTG 1 Progress Ongoing  -MO     LTG 2 Pt. will score </= 35%( from 52% at evaluation) on Modified Oswestry to indicate improved perception of disability.  -MO     LTG 2 Progress Ongoing  -MO     LTG 3 Pt. will demonstrate proper TrA engagement in standing and dynamic movements to improve lumbopelvic stability.  -MO     LTG 3 Progress Ongoing  -MO     LTG 4 Pt will improve BL gross LE strength >/=4+/5 for improved participation in recreational activities  -MO     LTG 4 Progress " Ongoing;Partially Met  -MO     LTG 5 Pt will report <2 instances of shooting pain within 2 week time period, demoing improved LE flexibiltiy and overall improved QOL.  -MO     LTG 5 Progress Met  -MO               User Key  (r) = Recorded By, (t) = Taken By, (c) = Cosigned By      Initials Name Provider Type    Alie Briceño, PT Physical Therapist                                   Time Calculation:   Start Time: 1315  Stop Time: 1355  Time Calculation (min): 40 min  Timed Charges  02675 - PT Therapeutic Exercise Minutes: 40  Total Minutes  Timed Charges Total Minutes: 40   Total Minutes: 40  Therapy Charges for Today       Code Description Service Date Service Provider Modifiers Qty    18250002340 HC PT THER PROC EA 15 MIN 3/8/2024 Alie Smith, PT GP 3                      Alie Smith PT  3/8/2024

## 2024-03-12 ENCOUNTER — HOSPITAL ENCOUNTER (OUTPATIENT)
Facility: HOSPITAL | Age: 69
Discharge: HOME | End: 2024-03-12
Admitting: FAMILY MEDICINE
Payer: MEDICARE

## 2024-03-12 ENCOUNTER — HOSPITAL ENCOUNTER (OUTPATIENT)
Dept: PHYSICAL THERAPY | Facility: HOSPITAL | Age: 69
Setting detail: THERAPIES SERIES
Discharge: HOME | End: 2024-03-12
Payer: MEDICARE

## 2024-03-12 DIAGNOSIS — M47.816 SPONDYLOSIS OF LUMBAR SPINE: ICD-10-CM

## 2024-03-12 DIAGNOSIS — M51.36 DEGENERATIVE DISC DISEASE, LUMBAR: ICD-10-CM

## 2024-03-12 DIAGNOSIS — M25.662 DECREASED RANGE OF MOTION OF BOTH LOWER EXTREMITIES: ICD-10-CM

## 2024-03-12 DIAGNOSIS — M54.42 CHRONIC MIDLINE LOW BACK PAIN WITH LEFT-SIDED SCIATICA: Primary | ICD-10-CM

## 2024-03-12 DIAGNOSIS — G89.29 CHRONIC MIDLINE LOW BACK PAIN WITH LEFT-SIDED SCIATICA: Primary | ICD-10-CM

## 2024-03-12 DIAGNOSIS — M54.16 LEFT LUMBAR RADICULITIS: ICD-10-CM

## 2024-03-12 DIAGNOSIS — M25.661 DECREASED RANGE OF MOTION OF BOTH LOWER EXTREMITIES: ICD-10-CM

## 2024-03-12 DIAGNOSIS — M54.50 LUMBAR PAIN: ICD-10-CM

## 2024-03-12 DIAGNOSIS — M25.69 DECREASED ROM OF TRUNK AND BACK: ICD-10-CM

## 2024-03-12 PROCEDURE — 72148 MRI LUMBAR SPINE W/O DYE: CPT

## 2024-03-12 PROCEDURE — 97110 THERAPEUTIC EXERCISES: CPT

## 2024-03-12 NOTE — THERAPY TREATMENT NOTE
Outpatient Physical Therapy Ortho Treatment Note  [facility]     Patient Name: Arash Roman  : 1955  MRN: [id number]  Today's Date: 3/12/2024      Visit Date: 2024    Visit Dx:    ICD-10-CM ICD-9-CM   1. Chronic midline low back pain with left-sided sciatica  M54.42 724.2    G89.29 724.3     338.29   2. Decreased range of motion of both lower extremities  M25.661 719.56    M25.662    3. Decreased ROM of trunk and back  M25.69 719.59       Patient Active Problem List   Diagnosis    Hypothyroidism    Mixed hyperlipidemia    Depression    Peyronie's disease    Adhesive capsulitis of left shoulder    Allergic rhinitis    Vitamin D deficiency    Colon polyp    Family history of colon cancer    Essential hypertension        Past Medical History:   Diagnosis Date    Allergic rhinitis     Basal cell carcinoma     followed by Dermatology, Dr. Lopez    Colon polyp     Depression     Frozen shoulder     Hypercholesteremia     Hypertension     Hypothyroidism     Peyronie's disease     Vitamin D deficiency         Past Surgical History:   Procedure Laterality Date    COLONOSCOPY N/A 2016    Procedure: COLONOSCOPY to cecum/TI; polypectomy(cold bx);  Surgeon: Juno Steven MD;  Location: [facility] ENDOSCOPY;  Service:     COLONOSCOPY N/A 10/15/2018    Procedure: COLONOSCOPY TO CECUM/TI WITH POLYPECTOMY (COLD BX);  Surgeon: Juno Steven MD;  Location: [facility] ENDOSCOPY;  Service: Gastroenterology    COLONOSCOPY N/A 2020    Procedure: COLONOSCOPY to cecum and TI:;  Surgeon: Juno Steven MD;  Location: [facility] ENDOSCOPY;  Service: Gastroenterology;  Laterality: N/A;  family hx colon cancer, personal hx colon polyps  post:  externa; hemorrhoids,     COLONOSCOPY N/A 2023    Procedure: COLONOSCOPY TO CECUM/TI WITH COLD SNARE POLYPECTOMY;  Surgeon: Juno Steven MD;  Location: [facility] ENDOSCOPY;  Service: Gastroenterology;  Laterality: N/A;  pre: PERSONAL HX OF COLON POLYPS, FAMILY HX OF COLON  CANCER  post: POLYP, INTERNAL HEMORRHOIDS    TONSILLECTOMY                          PT Assessment/Plan       Row Name 03/12/24 1500          PT Assessment    Assessment Comments Continued to work on isolating glute activation this date with addition of standing hip ext towel slides with much improved activation in this position. Additionally continued to build core and proximal hip strengthening with addition of resisted 3D hip. Pt reports fatigue at session termination however denies any onset of back pain. Will continue to progress as appropriate.  -MO        PT Plan    PT Plan Comments full review of HEP with exercise updated. Mini lunge matrix  -MO               User Key  (r) = Recorded By, (t) = Taken By, (c) = Cosigned By      Initials Name Provider Type    Alie Briceño, PT Physical Therapist                       OP Exercises       Row Name 03/12/24 1100             Subjective    Subjective Comments Still having some soreness  -MO         Total Minutes    42431 - PT Therapeutic Exercise Minutes 40  -MO         Exercise 1    Exercise Name 1 nustep  -MO      Cueing 1 Verbal  -MO      Time 1 5 min; lvl 5  -MO         Exercise 3    Exercise Name 3 figure 4/ piriformis stretch  -MO      Cueing 3 Verbal  -MO      Reps 3 2e B  -MO      Time 3 20s  -MO         Exercise 4    Exercise Name 4 standing hip ext w/ towel slide  -MO      Cueing 4 Verbal;Tactile;Demo  -MO      Sets 4 1 BL  -MO      Reps 4 10  -MO      Time 4 3s  -MO         Exercise 5    Exercise Name 5 straight leg to march w/ alt arm tap  -MO      Cueing 5 Verbal  -MO      Sets 5 2 BL  -MO      Reps 5 10  -MO         Exercise 9    Exercise Name 9 3D hip  -MO      Cueing 9 Verbal;Demo  -MO      Sets 9 1e  -MO      Reps 9 10  -MO      Time 9 YTB at knees  -MO      Additional Comments on airex  -MO         Exercise 11    Exercise Name 11 noemi HF stretch off mat table  -MO      Cueing 11 Verbal  -MO      Sets 11 1 BL  -MO      Time 11 2 mins  -MO          Exercise 12    Exercise Name 12 prone hip ext  -MO      Cueing 12 Verbal  -MO      Reps 12 1 BL  -MO      Time 12 15  -MO                User Key  (r) = Recorded By, (t) = Taken By, (c) = Cosigned By      Initials Name Provider Type    Alie Briceño, PT Physical Therapist                                  PT OP Goals       Row Name 03/12/24 1200          PT Short Term Goals    STG Date to Achieve 03/01/24  -MO     STG 1 Pt. will be independent with initial HEP to improve self-management of condition.  -MO     STG 1 Progress Met  -MO     STG 2 Pt. will demonstrate proper body mechanics with forward bending/lifting activities to preserve lumbar spine with functional activities.  -MO     STG 2 Progress Met  -MO     STG 3 Pt will report >/= 25% improvement in overall symptoms for improved QOL.  -MO     STG 3 Progress Partially Met  -MO        Long Term Goals    LTG Date to Achieve 03/31/24  -MO     LTG 1 Pt. will be independent with advanced HEP to improve long term independence with self management of condition.  -MO     LTG 1 Progress Ongoing  -MO     LTG 2 Pt. will score </= 35%( from 52% at evaluation) on Modified Oswestry to indicate improved perception of disability.  -MO     LTG 2 Progress Ongoing  -MO     LTG 3 Pt. will demonstrate proper TrA engagement in standing and dynamic movements to improve lumbopelvic stability.  -MO     LTG 3 Progress Ongoing  -MO     LTG 4 Pt will improve BL gross LE strength >/=4+/5 for improved participation in recreational activities  -MO     LTG 4 Progress Ongoing;Partially Met  -MO     LTG 5 Pt will report <2 instances of shooting pain within 2 week time period, demoing improved LE flexibiltiy and overall improved QOL.  -MO     LTG 5 Progress Met  -MO               User Key  (r) = Recorded By, (t) = Taken By, (c) = Cosigned By      Initials Name Provider Type    Alie Briceño, PT Physical Therapist                                   Time Calculation:   Start Time: 1130  Stop  Time: 1210  Time Calculation (min): 40 min  Timed Charges  28001 - PT Therapeutic Exercise Minutes: 40  Total Minutes  Timed Charges Total Minutes: 40   Total Minutes: 40  Therapy Charges for Today       Code Description Service Date Service Provider Modifiers Qty    67953917953  PT THER PROC EA 15 MIN 3/12/2024 Alie Smith, PT GP 3                      Alie Smith, PT  3/12/2024

## 2024-03-15 ENCOUNTER — HOSPITAL ENCOUNTER (OUTPATIENT)
Dept: PHYSICAL THERAPY | Facility: HOSPITAL | Age: 69
Setting detail: THERAPIES SERIES
Discharge: HOME | End: 2024-03-15
Payer: MEDICARE

## 2024-03-15 ENCOUNTER — OFFICE VISIT (OUTPATIENT)
Dept: SPORTS MEDICINE | Facility: CLINIC | Age: 69
End: 2024-03-15
Payer: MEDICARE

## 2024-03-15 VITALS
RESPIRATION RATE: 16 BRPM | HEART RATE: 76 BPM | OXYGEN SATURATION: 99 % | HEIGHT: 67 IN | SYSTOLIC BLOOD PRESSURE: 120 MMHG | DIASTOLIC BLOOD PRESSURE: 80 MMHG | BODY MASS INDEX: 26.37 KG/M2 | WEIGHT: 168 LBS

## 2024-03-15 DIAGNOSIS — M54.42 CHRONIC MIDLINE LOW BACK PAIN WITH LEFT-SIDED SCIATICA: Primary | ICD-10-CM

## 2024-03-15 DIAGNOSIS — M47.816 SPONDYLOSIS OF LUMBAR SPINE: ICD-10-CM

## 2024-03-15 DIAGNOSIS — M54.16 LEFT LUMBAR RADICULITIS: Primary | ICD-10-CM

## 2024-03-15 DIAGNOSIS — M25.69 DECREASED ROM OF TRUNK AND BACK: ICD-10-CM

## 2024-03-15 DIAGNOSIS — M25.662 DECREASED RANGE OF MOTION OF BOTH LOWER EXTREMITIES: ICD-10-CM

## 2024-03-15 DIAGNOSIS — M25.661 DECREASED RANGE OF MOTION OF BOTH LOWER EXTREMITIES: ICD-10-CM

## 2024-03-15 DIAGNOSIS — G89.29 CHRONIC MIDLINE LOW BACK PAIN WITH LEFT-SIDED SCIATICA: Primary | ICD-10-CM

## 2024-03-15 DIAGNOSIS — M51.36 DEGENERATIVE DISC DISEASE, LUMBAR: ICD-10-CM

## 2024-03-15 PROCEDURE — 97110 THERAPEUTIC EXERCISES: CPT

## 2024-03-15 PROCEDURE — 99214 OFFICE O/P EST MOD 30 MIN: CPT | Performed by: FAMILY MEDICINE

## 2024-03-15 PROCEDURE — 3074F SYST BP LT 130 MM HG: CPT | Performed by: FAMILY MEDICINE

## 2024-03-15 PROCEDURE — 1160F RVW MEDS BY RX/DR IN RCRD: CPT | Performed by: FAMILY MEDICINE

## 2024-03-15 PROCEDURE — 1159F MED LIST DOCD IN RCRD: CPT | Performed by: FAMILY MEDICINE

## 2024-03-15 PROCEDURE — 3079F DIAST BP 80-89 MM HG: CPT | Performed by: FAMILY MEDICINE

## 2024-03-15 NOTE — PROGRESS NOTES
"Arash is a 68 y.o. year old male presents to [facility]    Chief Complaint   Patient presents with    Lumbar Spine - Follow-up, Pain     F/u eval for lumbar pain and to review MRI completed on 03/12/2024        History of Present Illness  History of Present Illness  The patient presents for evaluation of low back pain.    He has stiffness and chronic pains. He has been going to physical therapy. He has not had the shooting pain going down his leg from the hip down to his knee and sometimes the foot. He called Dr. Siddiqui and asked for help, but the chronic low back pain continues. He struggles getting into a comfortable position to sleep at night. Sometimes he wakes up and shifts and it interrupts his sleep. He took the steroid and meloxicam before he came to see me. He took it for a few days, but he did not see any improvement, so he stopped taking it. In the meantime, he switched primary care providers and the nurse practitioner that he is seeing now prescribed Zanaflex, which he has not started taking. He did not want to take anything until he got to the bottom of the MRI. He is finishing up the first round of physical therapy today.    I have reviewed the patient's medical, family, and social history in detail and updated the computerized patient record.    /80 (BP Location: Right arm, Patient Position: Sitting, Cuff Size: Adult)   Pulse 76   Resp 16   Ht 170.2 cm (67.01\")   Wt 76.2 kg (168 lb)   SpO2 99%   BMI 26.31 kg/m²      Physical Exam    Vital signs reviewed.   General: No acute distress.  Eyes: conjunctiva clear; pupils equally round and reactive  ENT: external ears atraumatic  CV: no peripheral edema  Resp: normal respiratory effort, no use of accessory muscles  Skin: no rashes or wounds; normal turgor  Psych: mood and affect appropriate; recent and remote memory intact  Neuro: sensation to light touch intact    MSK Exam  Physical Exam      MRI Lumbar Spine " Without Contrast (03/12/2024 15:53)       Results  Imaging  MRI of the lumbar spine was reviewed with the patient.         Diagnoses and all orders for this visit:    Left lumbar radiculitis  -     Ambulatory Referral to Hospital Pain Management Department    Degenerative disc disease, lumbar  -     Ambulatory Referral to Hospital Pain Management Department    Spondylosis of lumbar spine  -     Ambulatory Referral to Hospital Pain Management Department      Assessment & Plan  1. Chronic low back pain.  The root cause of his problem is his back. There are some degenerative discs at L5-S1 and disc bulge that goes to the left. He has bridging osteophyte formation at L3-4 and spurring at L2-3. He can discontinue the Zanaflex. I will refer him to pain management for an epidural injection. He will continue with physical therapy.    Follow-up  The patient will follow up as needed.      I spent 30 minutes caring for Arash on this date of service. This time includes time spent by me in the following activities:preparing for the visit, reviewing tests, obtaining and/or reviewing a separately obtained history, performing a medically appropriate examination and/or evaluation , counseling and educating the patient/family/caregiver, referring and communicating with other health care professionals , documenting information in the medical record, and independently interpreting results and communicating that information with the patient/family/caregiver    Follow Up     Return if symptoms worsen or fail to improve.    Patient was given instructions and counseling regarding his condition or for health maintenance advice. Please see specific information pulled into the AVS if appropriate.     Patient or patient representative verbalized consent for the use of Ambient Listening during the visit with  ALANA Kiser Jr.,  for chart documentation. 3/15/2024  10:23 EDT

## 2024-03-15 NOTE — THERAPY TREATMENT NOTE
Outpatient Physical Therapy Ortho Treatment Note  [facility]     Patient Name: Arash Roman  : 1955  MRN: [id number]  Today's Date: 3/15/2024      Visit Date: 03/15/2024    Visit Dx:    ICD-10-CM ICD-9-CM   1. Chronic midline low back pain with left-sided sciatica  M54.42 724.2    G89.29 724.3     338.29   2. Decreased range of motion of both lower extremities  M25.661 719.56    M25.662    3. Decreased ROM of trunk and back  M25.69 719.59       Patient Active Problem List   Diagnosis    Hypothyroidism    Mixed hyperlipidemia    Depression    Peyronie's disease    Adhesive capsulitis of left shoulder    Allergic rhinitis    Vitamin D deficiency    Colon polyp    Family history of colon cancer    Essential hypertension        Past Medical History:   Diagnosis Date    Allergic rhinitis     Basal cell carcinoma     followed by Dermatology, Dr. Lopez    Colon polyp     Depression     Frozen shoulder     Hypercholesteremia     Hypertension     Hypothyroidism     Peyronie's disease     Vitamin D deficiency         Past Surgical History:   Procedure Laterality Date    COLONOSCOPY N/A 2016    Procedure: COLONOSCOPY to cecum/TI; polypectomy(cold bx);  Surgeon: Juno Steven MD;  Location: [facility] ENDOSCOPY;  Service:     COLONOSCOPY N/A 10/15/2018    Procedure: COLONOSCOPY TO CECUM/TI WITH POLYPECTOMY (COLD BX);  Surgeon: Juno Steven MD;  Location: [facility] ENDOSCOPY;  Service: Gastroenterology    COLONOSCOPY N/A 2020    Procedure: COLONOSCOPY to cecum and TI:;  Surgeon: Juno Steven MD;  Location: [facility] ENDOSCOPY;  Service: Gastroenterology;  Laterality: N/A;  family hx colon cancer, personal hx colon polyps  post:  externa; hemorrhoids,     COLONOSCOPY N/A 2023    Procedure: COLONOSCOPY TO CECUM/TI WITH COLD SNARE POLYPECTOMY;  Surgeon: Juno Steven MD;  Location: [facility] ENDOSCOPY;  Service: Gastroenterology;  Laterality: N/A;  pre: PERSONAL HX OF COLON POLYPS, FAMILY HX OF COLON  CANCER  post: POLYP, INTERNAL HEMORRHOIDS    TONSILLECTOMY                          PT Assessment/Plan       Row Name 03/15/24 1400          PT Assessment    Assessment Comments Spent additional time stretching this date to ensure pt has good understanding of HEP with appropriate order of stretches to reduce overall stiffness in the AM. Added open book completed for thoracic mobility and thoracolumbar both with good tolerance. Reviewed HEP, completed several older exercises to ensure proper form and completion as well as tying into stretch routine. Additionally trialed manual LAD of LLE, no radicular symptoms, good stretch noted in back. Will progress as appropriate.  -MO        PT Plan    PT Plan Comments How was stretch routine? mini lunge matrix  -MO               User Key  (r) = Recorded By, (t) = Taken By, (c) = Cosigned By      Initials Name Provider Type    Alie Briceño, PT Physical Therapist                       OP Exercises       Row Name 03/15/24 1100             Total Minutes    09540 - PT Therapeutic Exercise Minutes 38  -MO      25498 - PT Manual Therapy Minutes 5  -MO         Exercise 1    Exercise Name 1 nustep  -MO      Cueing 1 Verbal  -MO      Time 1 5 min; lvl 5  -MO         Exercise 2    Exercise Name 2 LTR  -MO      Cueing 2 Verbal  -MO      Sets 2 1 BL  -MO      Reps 2 10  -MO         Exercise 3    Exercise Name 3 figure 4/ piriformis stretch  -MO      Cueing 3 Verbal  -MO      Reps 3 3e B  -MO         Exercise 6    Exercise Name 6 SL open book  -MO      Cueing 6 Verbal;Demo  -MO      Sets 6 2 BL  -MO      Reps 6 10  -MO      Time 6 1set thoracic, 1 set thoracolumnar  -MO         Exercise 7    Exercise Name 7 PPT with TrA  -MO      Cueing 7 Verbal  -MO      Sets 7 1  -MO      Reps 7 10  -MO      Time 7 3s  -MO         Exercise 8    Exercise Name 8 bridge with RTB  -MO      Cueing 8 Verbal  -MO      Sets 8 2  -MO      Reps 8 10  -MO         Exercise 10    Exercise Name 10 march to SLR w/  PPT/TrA  -MO      Cueing 10 Verbal  -MO      Sets 10 2 BL  -MO      Reps 10 10  -MO                User Key  (r) = Recorded By, (t) = Taken By, (c) = Cosigned By      Initials Name Provider Type    Alie Briceño, DWIGHT Physical Therapist                             Manual Rx (Last 36 Hours)       Manual Treatments       Row Name 03/15/24 1300 03/15/24 1100          Total Minutes    80035 - PT Manual Therapy Minutes -- 5  -MO        Manual Rx 1    Manual Rx 1 Location LLE  -MO --     Manual Rx 1 Type LAD  -MO --               User Key  (r) = Recorded By, (t) = Taken By, (c) = Cosigned By      Initials Name Provider Type    Alie Briceño PT Physical Therapist                                       Time Calculation:   Start Time: 1132  Stop Time: 1215  Time Calculation (min): 43 min  Timed Charges  67138 - PT Therapeutic Exercise Minutes: 38  15440 - PT Manual Therapy Minutes: 5  Total Minutes  Timed Charges Total Minutes: 43   Total Minutes: 43  Therapy Charges for Today       Code Description Service Date Service Provider Modifiers Qty    00649710581 HC PT THER PROC EA 15 MIN 3/15/2024 Alie Smith, PT GP 3                      Alie Smith PT  3/15/2024

## 2024-03-19 ENCOUNTER — APPOINTMENT (OUTPATIENT)
Dept: PHYSICAL THERAPY | Facility: HOSPITAL | Age: 69
End: 2024-03-19
Payer: MEDICARE

## 2024-03-26 ENCOUNTER — HOSPITAL ENCOUNTER (OUTPATIENT)
Dept: PHYSICAL THERAPY | Facility: HOSPITAL | Age: 69
Setting detail: THERAPIES SERIES
Discharge: HOME | End: 2024-03-26
Payer: MEDICARE

## 2024-03-26 DIAGNOSIS — G89.29 CHRONIC MIDLINE LOW BACK PAIN WITH LEFT-SIDED SCIATICA: Primary | ICD-10-CM

## 2024-03-26 DIAGNOSIS — M25.661 DECREASED RANGE OF MOTION OF BOTH LOWER EXTREMITIES: ICD-10-CM

## 2024-03-26 DIAGNOSIS — M25.662 DECREASED RANGE OF MOTION OF BOTH LOWER EXTREMITIES: ICD-10-CM

## 2024-03-26 DIAGNOSIS — M25.69 DECREASED ROM OF TRUNK AND BACK: ICD-10-CM

## 2024-03-26 DIAGNOSIS — M54.42 CHRONIC MIDLINE LOW BACK PAIN WITH LEFT-SIDED SCIATICA: Primary | ICD-10-CM

## 2024-03-26 PROCEDURE — 97110 THERAPEUTIC EXERCISES: CPT

## 2024-03-26 NOTE — THERAPY TREATMENT NOTE
Outpatient Physical Therapy Ortho Treatment Note  [facility]     Patient Name: Arash Roman  : 1955  MRN: [id number]  Today's Date: 3/26/2024      Visit Date: 2024    Visit Dx:    ICD-10-CM ICD-9-CM   1. Chronic midline low back pain with left-sided sciatica  M54.42 724.2    G89.29 724.3     338.29   2. Decreased range of motion of both lower extremities  M25.661 719.56    M25.662    3. Decreased ROM of trunk and back  M25.69 719.59       Patient Active Problem List   Diagnosis    Hypothyroidism    Mixed hyperlipidemia    Depression    Peyronie's disease    Adhesive capsulitis of left shoulder    Allergic rhinitis    Vitamin D deficiency    Colon polyp    Family history of colon cancer    Essential hypertension        Past Medical History:   Diagnosis Date    Allergic rhinitis     Basal cell carcinoma     followed by Dermatology, Dr. Lopez    Colon polyp     Depression     Frozen shoulder     Hypercholesteremia     Hypertension     Hypothyroidism     Peyronie's disease     Vitamin D deficiency         Past Surgical History:   Procedure Laterality Date    COLONOSCOPY N/A 2016    Procedure: COLONOSCOPY to cecum/TI; polypectomy(cold bx);  Surgeon: Juno Steven MD;  Location: [facility] ENDOSCOPY;  Service:     COLONOSCOPY N/A 10/15/2018    Procedure: COLONOSCOPY TO CECUM/TI WITH POLYPECTOMY (COLD BX);  Surgeon: Juno Steven MD;  Location: [facility] ENDOSCOPY;  Service: Gastroenterology    COLONOSCOPY N/A 2020    Procedure: COLONOSCOPY to cecum and TI:;  Surgeon: Juno Steven MD;  Location: [facility] ENDOSCOPY;  Service: Gastroenterology;  Laterality: N/A;  family hx colon cancer, personal hx colon polyps  post:  externa; hemorrhoids,     COLONOSCOPY N/A 2023    Procedure: COLONOSCOPY TO CECUM/TI WITH COLD SNARE POLYPECTOMY;  Surgeon: Juno Steven MD;  Location: [facility] ENDOSCOPY;  Service: Gastroenterology;  Laterality: N/A;  pre: PERSONAL HX OF COLON POLYPS, FAMILY HX OF COLON  CANCER  post: POLYP, INTERNAL HEMORRHOIDS    TONSILLECTOMY                          PT Assessment/Plan       Row Name 03/26/24 1500          PT Assessment    Assessment Comments Pt reports ongoing issues with low back stiffness, reports performing HEP of stretching to manage. Reports he performed several stretches about an hour before coming, so focused on progression of strength challenges this date.  -CC        PT Plan    PT Plan Comments Assess response to progressed strength challenges and consider to HEP.  -CC               User Key  (r) = Recorded By, (t) = Taken By, (c) = Cosigned By      Initials Name Provider Type    Darlin Ortega, PT Physical Therapist                       OP Exercises       Row Name 03/26/24 1400             Subjective    Subjective Comments Feeling stiff today. I did some of the stretches about an hour before coming  -CC         Subjective Pain    Able to rate subjective pain? yes  -CC      Pre-Treatment Pain Level 4  -CC         Total Minutes    49815 - PT Therapeutic Exercise Minutes 40  -CC         Exercise 1    Exercise Name 1 nustep  -CC      Cueing 1 Verbal  -CC      Time 1 5 min; lvl 5  -CC         Exercise 2    Exercise Name 2 SB roll out  -CC      Cueing 2 Verbal  -CC      Sets 2 1 ea: fwd, lat ea way  -CC      Reps 2 10  -CC      Time 2 10sec  -CC         Exercise 3    Exercise Name 3 figure 4/ piriformis stretch  -CC      Cueing 3 Verbal  -CC      Reps 3 2e B  -CC      Time 3 30 sec  -CC         Exercise 4    Exercise Name 4 stir the pot  -CC      Cueing 4 Verbal  -CC      Sets 4 2 ea luisa  -CC      Reps 4 10 CW, 10 CCW  -CC      Time 4 GTB  -CC         Exercise 5    Exercise Name 5 tband shldr ext with SLS with kickstand  -CC      Cueing 5 Verbal  -CC      Sets 5 2 ea leg  -CC      Reps 5 10  -CC      Time 5 GTB  -CC         Exercise 6    Exercise Name 6 SL open book  -CC      Cueing 6 Verbal;Demo  -CC      Sets 6 2 BL  -CC      Reps 6 10  -CC      Time 6 1set  thoracic, 1 set thoracolumnar  -CC         Exercise 7    Exercise Name 7 lunge matrix  -CC      Cueing 7 Verbal  -CC      Sets 7 2 ea leg  -CC      Reps 7 10  -CC      Additional Comments cues for appropriate form, bending knee  -CC         Exercise 8    Exercise Name 8 bridge with RTB  -CC      Cueing 8 Verbal  -CC      Sets 8 2  -CC      Reps 8 10  -CC                User Key  (r) = Recorded By, (t) = Taken By, (c) = Cosigned By      Initials Name Provider Type    CC Darlin Menjivar, PT Physical Therapist                                                    Time Calculation:   Start Time: 1447  Stop Time: 1527  Time Calculation (min): 40 min  Total Timed Code Minutes- PT: 40 minute(s)  Timed Charges  50241 - PT Therapeutic Exercise Minutes: 40  Total Minutes  Timed Charges Total Minutes: 40   Total Minutes: 40  Therapy Charges for Today       Code Description Service Date Service Provider Modifiers Qty    30177167698 HC PT THER PROC EA 15 MIN 3/26/2024 Darlin Menjivar, PT GP 3                      Darlin Menjivar PT  3/26/2024

## 2024-04-02 ENCOUNTER — HOSPITAL ENCOUNTER (OUTPATIENT)
Dept: PHYSICAL THERAPY | Facility: HOSPITAL | Age: 69
Setting detail: THERAPIES SERIES
Discharge: HOME | End: 2024-04-02
Payer: MEDICARE

## 2024-04-02 DIAGNOSIS — M54.42 CHRONIC MIDLINE LOW BACK PAIN WITH LEFT-SIDED SCIATICA: Primary | ICD-10-CM

## 2024-04-02 DIAGNOSIS — M53.86 DECREASED RANGE OF MOTION OF LUMBAR SPINE: ICD-10-CM

## 2024-04-02 DIAGNOSIS — M25.661 DECREASED RANGE OF MOTION OF BOTH LOWER EXTREMITIES: ICD-10-CM

## 2024-04-02 DIAGNOSIS — G89.29 CHRONIC MIDLINE LOW BACK PAIN WITH LEFT-SIDED SCIATICA: Primary | ICD-10-CM

## 2024-04-02 DIAGNOSIS — M54.50 CHRONIC MIDLINE LOW BACK PAIN WITHOUT SCIATICA: ICD-10-CM

## 2024-04-02 DIAGNOSIS — M25.662 DECREASED RANGE OF MOTION OF BOTH LOWER EXTREMITIES: ICD-10-CM

## 2024-04-02 DIAGNOSIS — G89.29 CHRONIC MIDLINE LOW BACK PAIN WITHOUT SCIATICA: ICD-10-CM

## 2024-04-02 DIAGNOSIS — M25.69 DECREASED ROM OF TRUNK AND BACK: ICD-10-CM

## 2024-04-02 PROCEDURE — 97530 THERAPEUTIC ACTIVITIES: CPT

## 2024-04-02 PROCEDURE — 97110 THERAPEUTIC EXERCISES: CPT

## 2024-04-02 NOTE — THERAPY PROGRESS REPORT/RE-CERT
Outpatient Physical Therapy Ortho Progress Note  [facility]     Patient Name: Arash Roman  : 1955  MRN: [id number]  Today's Date: 2024      Visit Date: 2024    Visit Dx:    ICD-10-CM ICD-9-CM   1. Chronic midline low back pain with left-sided sciatica  M54.42 724.2    G89.29 724.3     338.29   2. Decreased range of motion of both lower extremities  M25.661 719.56    M25.662    3. Decreased ROM of trunk and back  M25.69 719.59   4. Chronic midline low back pain without sciatica  M54.50 724.2    G89.29 338.29   5. Decreased range of motion of lumbar spine  M53.86 724.9       Patient Active Problem List   Diagnosis    Hypothyroidism    Mixed hyperlipidemia    Depression    Peyronie's disease    Adhesive capsulitis of left shoulder    Allergic rhinitis    Vitamin D deficiency    Colon polyp    Family history of colon cancer    Essential hypertension        Past Medical History:   Diagnosis Date    Allergic rhinitis     Basal cell carcinoma     followed by Dermatology, Dr. Lopez    Colon polyp     Depression     Frozen shoulder     Hypercholesteremia     Hypertension     Hypothyroidism     Peyronie's disease     Vitamin D deficiency         Past Surgical History:   Procedure Laterality Date    COLONOSCOPY N/A 2016    Procedure: COLONOSCOPY to cecum/TI; polypectomy(cold bx);  Surgeon: Juno Steven MD;  Location: [facility] ENDOSCOPY;  Service:     COLONOSCOPY N/A 10/15/2018    Procedure: COLONOSCOPY TO CECUM/TI WITH POLYPECTOMY (COLD BX);  Surgeon: Juno Steven MD;  Location: [facility] ENDOSCOPY;  Service: Gastroenterology    COLONOSCOPY N/A 2020    Procedure: COLONOSCOPY to cecum and TI:;  Surgeon: Juno Steven MD;  Location: [facility] ENDOSCOPY;  Service: Gastroenterology;  Laterality: N/A;  family hx colon cancer, personal hx colon polyps  post:  externa; hemorrhoids,     COLONOSCOPY N/A 2023    Procedure: COLONOSCOPY TO CECUM/TI WITH COLD SNARE POLYPECTOMY;  Surgeon: Juno Steven  MD;  Location: [facility] ENDOSCOPY;  Service: Gastroenterology;  Laterality: N/A;  pre: PERSONAL HX OF COLON POLYPS, FAMILY HX OF COLON CANCER  post: POLYP, INTERNAL HEMORRHOIDS    TONSILLECTOMY          PT Ortho       Row Name 04/02/24 1400       MMT Right Lower Ext    Rt Hip Extension MMT, Gross Movement (4+/5) good plus  -MO    Rt Hip ABduction MMT, Gross Movement (4+/5) good plus  -MO       MMT Left Lower Ext    Lt Hip Extension MMT, Gross Movement (4/5) good  -MO    Lt Hip ABduction MMT, Gross Movement (4+/5) good plus  -MO              User Key  (r) = Recorded By, (t) = Taken By, (c) = Cosigned By      Initials Name Provider Type    Alie Briceño, PT Physical Therapist                                 PT Assessment/Plan       Row Name 04/02/24 1400          PT Assessment    Assessment Comments Arash Roman has been seen for 12 physical therapy sessions for chronic LBP.  Treatment has included therapeutic exercise, therapeutic activity, and patient education with home exercise program . Progress to physical therapy goals is good. Pt has met 3/3 STG and 2/5 LTG. Subjectively pt reports feeling about 40-50% improvement in overall symptoms since the start of PT. He continues to deny any sharp/stabbing pain radiating down the leg and has had resolution of tingling symptoms in glutes. He has remained adherent to HEP, noting good improvement in mobility with daily stretching, and continues to build core/hip strength for improved spinal stability. MMT this date show nearly all gross BL hip/LE strength to 4+/5, excluding L hip ext however he is progressing appropriately. Reviewed typical/expected healing time frames in regards to tissue lengthening and strength building at 10-12 weeks. Explained progress results and discussed trialing independent management at next session. He will benefit from continued skilled physical therapy to address remaining impairments and functional limitations.  -MO        PT Plan    PT  Plan Comments review strength training guidelines, HEP review  -MO               User Key  (r) = Recorded By, (t) = Taken By, (c) = Cosigned By      Initials Name Provider Type    Alie Briceño, PT Physical Therapist                       OP Exercises       Row Name 04/02/24 1400             Subjective    Subjective Comments Feeling about 40-50% improvement. I did a lot of yard work yesterday so didn't get exercises in and still a little stiff today. I dont't get that stabbing pain anymore and I haven't had the tingling sensation either  -MO         Total Minutes    43162 - PT Therapeutic Exercise Minutes 25  -MO      78498 - PT Therapeutic Activity Minutes 15  -MO         Exercise 1    Exercise Name 1 nustep  -MO      Cueing 1 Verbal  -MO      Time 1 5 min; lvl 5  -MO         Exercise 2    Exercise Name 2 wall slides  -MO      Cueing 2 Verbal  -MO      Sets 2 1e  -MO      Reps 2 10  -MO         Exercise 4    Exercise Name 4 stir the pot  -MO      Cueing 4 Verbal  -MO      Sets 4 2 ea luisa  -MO      Reps 4 10 CW, 10 CCW  -MO      Time 4 GTB  -MO         Exercise 5    Exercise Name 5 tband shldr ext with SLS with kickstand  -MO      Cueing 5 Verbal  -MO      Sets 5 2 ea leg  -MO      Reps 5 10  -MO      Time 5 GTB  -MO         Exercise 6    Exercise Name 6 SL open book  -MO      Cueing 6 Verbal  -MO      Sets 6 2 BL  -MO      Reps 6 10  -MO      Time 6 1set thoracic, 1 set thoracolumnar  -MO         Exercise 7    Exercise Name 7 lunge matrix  -MO      Cueing 7 Verbal  -MO      Sets 7 2 ea leg  -MO      Reps 7 10  -MO         Exercise 9    Exercise Name 9 Progress note  -MO                User Key  (r) = Recorded By, (t) = Taken By, (c) = Cosigned By      Initials Name Provider Type    Alie Briceño, PT Physical Therapist                                  PT OP Goals       Row Name 04/02/24 1400          PT Short Term Goals    STG Date to Achieve 03/01/24  -MO     STG 1 Pt. will be independent with initial HEP to  improve self-management of condition.  -MO     STG 1 Progress Met  -MO     STG 2 Pt. will demonstrate proper body mechanics with forward bending/lifting activities to preserve lumbar spine with functional activities.  -MO     STG 2 Progress Met  -MO     STG 3 Pt will report >/= 25% improvement in overall symptoms for improved QOL.  -MO     STG 3 Progress Met  -MO        Long Term Goals    LTG Date to Achieve 03/31/24  -MO     LTG 1 Pt. will be independent with advanced HEP to improve long term independence with self management of condition.  -MO     LTG 1 Progress Met  -MO     LTG 2 Pt. will score </= 35%( from 52% at evaluation) on Modified Oswestry to indicate improved perception of disability.  -MO     LTG 2 Progress Ongoing;Progressing  -MO     LTG 2 Progress Comments 46%  -MO     LTG 3 Pt. will demonstrate proper TrA engagement in standing and dynamic movements to improve lumbopelvic stability.  -MO     LTG 3 Progress Ongoing  -MO     LTG 4 Pt will improve BL gross LE strength >/=4+/5 for improved participation in recreational activities  -MO     LTG 4 Progress Ongoing;Partially Met  -MO     LTG 5 Pt will report <2 instances of shooting pain within 2 week time period, demoing improved LE flexibiltiy and overall improved QOL.  -MO     LTG 5 Progress Met  -MO               User Key  (r) = Recorded By, (t) = Taken By, (c) = Cosigned By      Initials Name Provider Type    Alie Briceño, PT Physical Therapist                         Outcome Measure Options: Modified Oswestry  Modified Oswestry  Modified Oswestry Score/Comments: 23/50- 46%      Time Calculation:   Start Time: 1405  Stop Time: 1445  Time Calculation (min): 40 min  Timed Charges  07758 - PT Therapeutic Exercise Minutes: 25  70619 - PT Therapeutic Activity Minutes: 15  Total Minutes  Timed Charges Total Minutes: 40   Total Minutes: 40  Therapy Charges for Today       Code Description Service Date Service Provider Modifiers Qty    20020494757  PT  THER PROC EA 15 MIN 4/2/2024 Alie Smith, PT GP 2    44935169837 HC PT THERAPEUTIC ACT EA 15 MIN 4/2/2024 Alie Smith, PT GP 1            PT G-Codes  Outcome Measure Options: Modified Oswestry  Modified Oswestry Score/Comments: 23/50- 46%         Alie Smith, PT  4/2/2024

## 2024-04-09 ENCOUNTER — HOSPITAL ENCOUNTER (OUTPATIENT)
Dept: PHYSICAL THERAPY | Facility: HOSPITAL | Age: 69
Setting detail: THERAPIES SERIES
Discharge: HOME | End: 2024-04-09
Payer: MEDICARE

## 2024-04-09 DIAGNOSIS — M54.50 CHRONIC MIDLINE LOW BACK PAIN WITHOUT SCIATICA: ICD-10-CM

## 2024-04-09 DIAGNOSIS — M25.69 DECREASED ROM OF TRUNK AND BACK: ICD-10-CM

## 2024-04-09 DIAGNOSIS — M25.662 DECREASED RANGE OF MOTION OF BOTH LOWER EXTREMITIES: ICD-10-CM

## 2024-04-09 DIAGNOSIS — M53.86 DECREASED RANGE OF MOTION OF LUMBAR SPINE: ICD-10-CM

## 2024-04-09 DIAGNOSIS — G89.29 CHRONIC MIDLINE LOW BACK PAIN WITH LEFT-SIDED SCIATICA: Primary | ICD-10-CM

## 2024-04-09 DIAGNOSIS — M54.42 CHRONIC MIDLINE LOW BACK PAIN WITH LEFT-SIDED SCIATICA: Primary | ICD-10-CM

## 2024-04-09 DIAGNOSIS — M25.661 DECREASED RANGE OF MOTION OF BOTH LOWER EXTREMITIES: ICD-10-CM

## 2024-04-09 DIAGNOSIS — G89.29 CHRONIC MIDLINE LOW BACK PAIN WITHOUT SCIATICA: ICD-10-CM

## 2024-04-09 PROCEDURE — 97530 THERAPEUTIC ACTIVITIES: CPT

## 2024-04-09 NOTE — THERAPY TREATMENT NOTE
Outpatient Physical Therapy Ortho Treatment Note  [facility]     Patient Name: Arash Roman  : 1955  MRN: [id number]  Today's Date: 2024      Visit Date: 2024    Visit Dx:    ICD-10-CM ICD-9-CM   1. Chronic midline low back pain with left-sided sciatica  M54.42 724.2    G89.29 724.3     338.29   2. Decreased range of motion of both lower extremities  M25.661 719.56    M25.662    3. Decreased ROM of trunk and back  M25.69 719.59   4. Decreased range of motion of lumbar spine  M53.86 724.9   5. Chronic midline low back pain without sciatica  M54.50 724.2    G89.29 338.29       Patient Active Problem List   Diagnosis    Hypothyroidism    Mixed hyperlipidemia    Depression    Peyronie's disease    Adhesive capsulitis of left shoulder    Allergic rhinitis    Vitamin D deficiency    Colon polyp    Family history of colon cancer    Essential hypertension        Past Medical History:   Diagnosis Date    Allergic rhinitis     Basal cell carcinoma     followed by Dermatology, Dr. Lopez    Colon polyp     Depression     Frozen shoulder     Hypercholesteremia     Hypertension     Hypothyroidism     Peyronie's disease     Vitamin D deficiency         Past Surgical History:   Procedure Laterality Date    COLONOSCOPY N/A 2016    Procedure: COLONOSCOPY to cecum/TI; polypectomy(cold bx);  Surgeon: Juno Steven MD;  Location: [facility] ENDOSCOPY;  Service:     COLONOSCOPY N/A 10/15/2018    Procedure: COLONOSCOPY TO CECUM/TI WITH POLYPECTOMY (COLD BX);  Surgeon: Juno Steven MD;  Location: [facility] ENDOSCOPY;  Service: Gastroenterology    COLONOSCOPY N/A 2020    Procedure: COLONOSCOPY to cecum and TI:;  Surgeon: Juno Steven MD;  Location: [facility] ENDOSCOPY;  Service: Gastroenterology;  Laterality: N/A;  family hx colon cancer, personal hx colon polyps  post:  externa; hemorrhoids,     COLONOSCOPY N/A 2023    Procedure: COLONOSCOPY TO CECUM/TI WITH COLD SNARE POLYPECTOMY;  Surgeon: Juno Steven  MD;  Location: [facility] ENDOSCOPY;  Service: Gastroenterology;  Laterality: N/A;  pre: PERSONAL HX OF COLON POLYPS, FAMILY HX OF COLON CANCER  post: POLYP, INTERNAL HEMORRHOIDS    TONSILLECTOMY                          PT Assessment/Plan       Row Name 04/09/24 1300          PT Assessment    Assessment Comments Spent session reviewing HEP with extensive discussion on strength training guidelines, daily stretching, and activity modifications as pt is going to trial independent managment for 3 weeks. Continued to review typical healing time frames of 12-15 weeks and encouraged pt to reach out should he have any questions between now and next follow up. Answered all questions, will progress as appropraite at next visit.  -MO        PT Plan    PT Plan Comments Ready for d/c? another 3 week break?  -MO               User Key  (r) = Recorded By, (t) = Taken By, (c) = Cosigned By      Initials Name Provider Type    Alie Briceño PT Physical Therapist                       OP Exercises       Row Name 04/09/24 1300             Subjective    Subjective Comments still the same. Just getting into a routine  -MO         Total Minutes    52351 - PT Therapeutic Activity Minutes 23  full HEP discussion documented in education  -MO         Exercise 3    Exercise Name 3 Full HEP discussion  -MO                User Key  (r) = Recorded By, (t) = Taken By, (c) = Cosigned By      Initials Name Provider Type    Alie Briceño PT Physical Therapist                                  PT OP Goals       Row Name 04/09/24 1300          PT Short Term Goals    STG Date to Achieve 03/01/24  -MO     STG 1 Pt. will be independent with initial HEP to improve self-management of condition.  -MO     STG 1 Progress Met  -MO     STG 2 Pt. will demonstrate proper body mechanics with forward bending/lifting activities to preserve lumbar spine with functional activities.  -MO     STG 2 Progress Met  -MO     STG 3 Pt will report >/= 25% improvement in  overall symptoms for improved QOL.  -MO     STG 3 Progress Met  -MO        Long Term Goals    LTG Date to Achieve 03/31/24  -MO     LTG 1 Pt. will be independent with advanced HEP to improve long term independence with self management of condition.  -MO     LTG 1 Progress Met  -MO     LTG 2 Pt. will score </= 35%( from 52% at evaluation) on Modified Oswestry to indicate improved perception of disability.  -MO     LTG 2 Progress Ongoing;Progressing  -MO     LTG 3 Pt. will demonstrate proper TrA engagement in standing and dynamic movements to improve lumbopelvic stability.  -MO     LTG 3 Progress Ongoing  -MO     LTG 4 Pt will improve BL gross LE strength >/=4+/5 for improved participation in recreational activities  -MO     LTG 4 Progress Ongoing;Partially Met  -MO     LTG 5 Pt will report <2 instances of shooting pain within 2 week time period, demoing improved LE flexibiltiy and overall improved QOL.  -MO     LTG 5 Progress Met  -MO               User Key  (r) = Recorded By, (t) = Taken By, (c) = Cosigned By      Initials Name Provider Type    Alie Briceño, PT Physical Therapist                    Therapy Education  Education Details: Full review of HEP. Educated pt on strength training guidelines with need to complete exercises 2-3x's per week, 2-3 sets at 8-12 reps. Discussed several ways to progress and modify each exercise as needed.  Given: HEP  Program: New, Reinforced  How Provided: Verbal, Written  Provided to: Patient  Level of Understanding: Verbalized              Time Calculation:   Start Time: 1320  Stop Time: 1343  Time Calculation (min): 23 min  Timed Charges  33903 - PT Therapeutic Activity Minutes: 23 (full HEP discussion documented in education)  Total Minutes  Timed Charges Total Minutes: 23   Total Minutes: 23  Therapy Charges for Today       Code Description Service Date Service Provider Modifiers Qty    [id number]  PT THERAPEUTIC ACT EA 15 MIN 4/9/2024 Alie Smith, PT GP 2                       Alie Smith, PT  4/9/2024

## 2024-04-10 NOTE — PROGRESS NOTES
Arash Roman is a 62 y.o. male.  Seen 10/02/2017    Assessment/Plan   Problem List Items Addressed This Visit        Endocrine    Hypothyroidism: TSH high at 4.34 on 9/27/17    Relevant Medications    Increase levothyroxine (SYNTHROID, LEVOTHROID) from 50 to 75 MCG tablet QAM   Repeat TSH in 2-3 months    Other Relevant Orders    US Thyroid      Other Visit Diagnoses     Annual physical exam    -  Primary    Relevant Orders    Flu Vaccine Quad PF 3YR+ (FLUARIX/FLUZONE 3689-0868) (Completed)    Varicella-Zoster Vaccine Subcutaneous (Completed)      Rash        Relevant Orders    Ambulatory Referral to Dermatology      Need for vaccination        Relevant Orders    Flu Vaccine Quad PF 3YR+ (FLUARIX/FLUZONE [id number]) (Completed)    Varicella-Zoster Vaccine Subcutaneous (Completed)             Return in about 2 months (around 12/2/2017) for Recheck thyroid.  Patient Instructions   Hypothyroidism  Hypothyroidism is a disorder of the thyroid. The thyroid is a large gland that is located in the lower front of the neck. The thyroid releases hormones that control how the body works. With hypothyroidism, the thyroid does not make enough of these hormones.  CAUSES  Causes of hypothyroidism may include:  · Viral infections.  · Pregnancy.  · Your own defense system (immune system) attacking your thyroid.  · Certain medicines.  · Birth defects.  · Past radiation treatments to your head or neck.  · Past treatment with radioactive iodine.  · Past surgical removal of part or all of your thyroid.  · Problems with the gland that is located in the center of your brain (pituitary).  SIGNS AND SYMPTOMS  Signs and symptoms of hypothyroidism may include:  · Feeling as though you have no energy (lethargy).  · Inability to tolerate cold.  · Weight gain that is not explained by a change in diet or exercise habits.  · Dry skin.  · Coarse hair.  · Menstrual irregularity.  · Slowing of thought processes.  · Constipation.  · Sadness or  Reviewed pt's insurance and no assistance is needed at this time with pt having Medicaid [employer] as her health plan. Unless her insurance changes no assistance is needed at this time.      depression.  DIAGNOSIS   Your health care provider may diagnose hypothyroidism with blood tests and ultrasound tests.  TREATMENT  Hypothyroidism is treated with medicine that replaces the hormones that your body does not make. After you begin treatment, it may take several weeks for symptoms to go away.  HOME CARE INSTRUCTIONS   · Take medicines only as directed by your health care provider.  · If you start taking any new medicines, tell your health care provider.  · Keep all follow-up visits as directed by your health care provider. This is important. As your condition improves, your dosage needs may change. You will need to have blood tests regularly so that your health care provider can watch your condition.  SEEK MEDICAL CARE IF:  · Your symptoms do not get better with treatment.  · You are taking thyroid replacement medicine and:    You sweat excessively.    You have tremors.    You feel anxious.    You lose weight rapidly.    You cannot tolerate heat.    You have emotional swings.    You have diarrhea.    You feel weak.  SEEK IMMEDIATE MEDICAL CARE IF:   · You develop chest pain.  · You develop an irregular heartbeat.  · You develop a rapid heartbeat.     This information is not intended to replace advice given to you by your health care provider. Make sure you discuss any questions you have with your health care provider.     Document Released: 12/18/2006 Document Revised: 01/08/2016 Document Reviewed: 05/05/2015  [employer] Interactive Patient Education ©2017 [employer]      Preventive Care 40-64 Years, Male  Preventive care refers to lifestyle choices and visits with your health care provider that can promote health and wellness.  WHAT DOES PREVENTIVE CARE INCLUDE?  · A yearly physical exam. This is also called an annual well check.  · Dental exams once or twice a year.  · Routine eye exams. Ask your health care provider how often you should have your eyes checked.  · Personal lifestyle choices,  including:    Daily care of your teeth and gums.    Regular physical activity.    Eating a healthy diet.    Avoiding tobacco and drug use.    Limiting alcohol use.    Practicing safe sex.    Taking low-dose aspirin every day starting at age 50.  WHAT HAPPENS DURING AN ANNUAL WELL CHECK?  The services and screenings done by your health care provider during your annual well check will depend on your age, overall health, lifestyle risk factors, and family history of disease.  Counseling  Your health care provider may ask you questions about your:  · Alcohol use.  · Tobacco use.  · Drug use.  · Emotional well-being.  · Home and relationship well-being.  · Sexual activity.  · Eating habits.  · Work and work environment.  Screening  You may have the following tests or measurements:  · Height, weight, and BMI.  · Blood pressure.  · Lipid and cholesterol levels. These may be checked every 5 years, or more frequently if you are over 50 years old.  · Skin check.  · Lung cancer screening. You may have this screening every year starting at age 55 if you have a 30-pack-year history of smoking and currently smoke or have quit within the past 15 years.  · Fecal occult blood test (FOBT) of the stool. You may have this test every year starting at age 50.  · Flexible sigmoidoscopy or colonoscopy. You may have a sigmoidoscopy every 5 years or a colonoscopy every 10 years starting at age 50.  · Prostate cancer screening. Recommendations will vary depending on your family history and other risks.  · Hepatitis C blood test.  · Hepatitis B blood test.  · Sexually transmitted disease (STD) testing.  · Diabetes screening. This is done by checking your blood sugar (glucose) after you have not eaten for a while (fasting). You may have this done every 1-3 years.  Discuss your test results, treatment options, and if necessary, the need for more tests with your health care provider.  Vaccines  Your health care provider may recommend certain  vaccines, such as:  · Influenza vaccine. This is recommended every year.  · Tetanus, diphtheria, and acellular pertussis (Tdap, Td) vaccine. You may need a Td booster every 10 years.  · Varicella vaccine. You may need this if you have not been vaccinated.  · Zoster vaccine. You may need this after age 60.  · Measles, mumps, and rubella (MMR) vaccine. You may need at least one dose of MMR if you were born in 1957 or later. You may also need a second dose.  · Pneumococcal 13-valent conjugate (PCV13) vaccine. You may need this if you have certain conditions and have not been vaccinated.  · Pneumococcal polysaccharide (PPSV23) vaccine. You may need one or two doses if you smoke cigarettes or if you have certain conditions.  · Meningococcal vaccine. You may need this if you have certain conditions.  · Hepatitis A vaccine. You may need this if you have certain conditions or if you travel or work in places where you may be exposed to hepatitis A.  · Hepatitis B vaccine. You may need this if you have certain conditions or if you travel or work in places where you may be exposed to hepatitis B.  · Haemophilus influenzae type b (Hib) vaccine. You may need this if you have certain risk factors.  Talk to your health care provider about which screenings and vaccines you need and how often you need them.     This information is not intended to replace advice given to you by your health care provider. Make sure you discuss any questions you have with your health care provider.     Document Released: 01/13/2017 Document Reviewed: 01/13/2017  [employer] Interactive Patient Education ©2017 [employer]        Subjective     Chief Complaint   Patient presents with   • Annual Exam     Physical   • lab follow up   • Hypothyroidism         History of Present Illness     R ankle rash:  -persisted despite treatment with topical triamcinolone and then clobetasol  -still itches   -it has not worsened but has not completely resolved  -present  x 4 months now    Hypothyroidism:  -diagnosed several years ago  -has been treated with levothyroxine 50 mg for at least 5 yr  -he has never had a thyroid US  -TSH elevated on labs from 9/27/17  -recent worsening of symptoms including weight gain and non-restful sleep over the last 6 months      HM:  -walks for about an hour for exercise about 3 x per week  -he does some core strengthening and body weight exercises at home most days  -tries to eat a healthy diet: yogurt and berries for breakfast, lettuce wrap or sandwich with veggies for lunch, lean meats and veggies for dinner  -he has a hx of colon polyps  -most recent colonoscopy was done 1 yr ago (5/12/16); records reviewed: 1 tubular adenoma removed  -he typically gets a colonoscopy Q 2-3 yr  -pt's Gastroenterologist is Dr. Steven  -plan for Hep C screening at follow up in 2 months  -dental exam UTD  -eye exam UTD  -metabolic labs reviewed as below  -PSA stable  -pt declines STI screening  -he would like to get flu and shingles vaccines today    The following portions of the patient's history were reviewed and updated as appropriate:PMHroutine: Social history , Past Medical History, Surgical history , Allergies, Current Medications, Active Problem List and Health Maintenance    Review of Systems   Constitutional: Positive for fatigue and unexpected weight change (gaining ). Negative for appetite change, chills and fever.   HENT: Negative for congestion, ear pain, hearing loss, rhinorrhea and sore throat.    Eyes: Negative for pain and visual disturbance.   Respiratory: Negative for cough, chest tightness and shortness of breath.    Cardiovascular: Negative for chest pain and palpitations.   Gastrointestinal: Positive for constipation (well controlled with diet changes). Negative for abdominal pain, blood in stool, diarrhea, nausea and vomiting.   Genitourinary: Negative for difficulty urinating, dysuria and frequency.   Musculoskeletal: Negative for arthralgias,  gait problem and myalgias.   Skin: Negative for pallor and rash.   Neurological: Negative for dizziness, syncope, weakness, light-headedness, numbness and headaches.   Psychiatric/Behavioral: Negative for dysphoric mood. The patient is not nervous/anxious.      Past Medical History:   Diagnosis Date   • Depression    • Hypercholesteremia    • Hypothyroidism    • Peyronie's disease      Past Surgical History:   Procedure Laterality Date   • COLONOSCOPY N/A 5/12/2016    Procedure: COLONOSCOPY to cecum/TI; polypectomy(cold bx);  Surgeon: Juno Steven MD;  Location: [facility] ENDOSCOPY;  Service:    • TONSILLECTOMY       Family History   Problem Relation Age of Onset   • Colon polyps Father    • Kidney disease Mother    • Alcohol abuse Mother    • Heart disease Maternal Uncle      Social History     Social History   • Marital status:        Social History Main Topics   • Smoking status: Never Smoker   • Smokeless tobacco: Never Used   • Alcohol use Yes      Comment: socially, 1-3 x per week   • Drug use: No   • Sexual activity: Yes     Partners: Female       Social History Narrative    Lives at home with his wife and a Parks Spaniel named Fatemeh.  Works as an ,  at [employer].           Current Outpatient Prescriptions:   •  Azelastine-Fluticasone 137-50 MCG/ACT suspension, 1 spray into each nostril daily. (Patient taking differently: 1 spray into each nostril Daily As Needed.), Disp: 23 g, Rfl: 11  •  cetirizine (zyrTEC) 10 MG tablet, Take 10 mg by mouth Daily., Disp: , Rfl:   •  cholecalciferol (VITAMIN D3) 1000 units tablet, Take 1,000 Units by mouth Daily., Disp: , Rfl:   •  levothyroxine (SYNTHROID, LEVOTHROID) 75 MCG tablet, Take 1 tablet by mouth Daily., Disp: 30 tablet, Rfl: 2  •  rosuvastatin (CRESTOR) 5 MG tablet, TAKE 1 TABLET BY MOUTH EVERY DAY, Disp: 90 tablet, Rfl: 3  •  clobetasol (TEMOVATE) 0.05 % ointment, Apply  topically 2 (Two) Times a Day. Do not apply to face or  "genitals, Disp: 15 g, Rfl: 0    Objective   Vitals:    10/02/17 1440   BP: 112/70   Pulse: 70   SpO2: 98%   Weight: 165 lb (74.8 kg)   Height: 67\" (170.2 cm)     Body mass index is 25.84 kg/(m^2).  Physical Exam   Constitutional: He is oriented to person, place, and time. Vital signs are normal. He appears well-developed and well-nourished. No distress.   BMI 25.8   HENT:   Head: Normocephalic and atraumatic.   Nose: Nose normal.   Mouth/Throat: Oropharynx is clear and moist.   Eyes: Conjunctivae are normal. Pupils are equal, round, and reactive to light.   Neck: Normal range of motion. Neck supple. Thyromegaly (mild, non-tender, no palpable nodules) present.   Cardiovascular: Normal rate, regular rhythm, S1 normal, S2 normal and normal heart sounds.  Exam reveals no gallop.    No murmur heard.  Pulses:       Radial pulses are 2+ on the right side, and 2+ on the left side.   Pulmonary/Chest: Effort normal and breath sounds normal. He has no wheezes. He has no rales. He exhibits no tenderness.   Abdominal: Soft. Bowel sounds are normal. He exhibits no distension. There is no hepatosplenomegaly. There is no tenderness. There is no rebound and no guarding.   Musculoskeletal: He exhibits no edema.   Lymphadenopathy:     He has no cervical adenopathy.   Neurological: He is alert and oriented to person, place, and time. He has normal strength. Gait normal.   Skin: Skin is warm and dry. No cyanosis. Nails show no clubbing.   Patch of erythema and fine scale with irregular borders over R ankle   Psychiatric: He has a normal mood and affect. His speech is normal and behavior is normal.   Nursing note and vitals reviewed.    Reviewed Data:  Results Encounter on 09/29/2017   Component Date Value Ref Range Status   • Glucose 09/27/2017 108* 65 - 99 mg/dL Final   • BUN 09/27/2017 17  8 - 23 mg/dL Final   • Creatinine 09/27/2017 1.02  0.76 - 1.27 mg/dL Final   • eGFR Non  Am 09/27/2017 74  >60 mL/min/1.73 Final   • eGFR "  Am 09/27/2017 89  >60 mL/min/1.73 Final   • BUN/Creatinine Ratio 09/27/2017 16.7  7.0 - 25.0 Final   • Sodium 09/27/2017 145  136 - 145 mmol/L Final   • Potassium 09/27/2017 4.3  3.5 - 5.2 mmol/L Final   • Chloride 09/27/2017 107  98 - 107 mmol/L Final   • Total CO2 09/27/2017 24.7  22.0 - 29.0 mmol/L Final   • Calcium 09/27/2017 9.5  8.6 - 10.5 mg/dL Final   • Total Protein 09/27/2017 7.0  6.0 - 8.5 g/dL Final   • Albumin 09/27/2017 4.60  3.50 - 5.20 g/dL Final   • Globulin 09/27/2017 2.4  gm/dL Final   • A/G Ratio 09/27/2017 1.9  g/dL Final   • Total Bilirubin 09/27/2017 0.5  0.1 - 1.2 mg/dL Final   • Alkaline Phosphatase 09/27/2017 60  39 - 117 U/L Final   • AST (SGOT) 09/27/2017 18  1 - 40 U/L Final   • ALT (SGPT) 09/27/2017 34  1 - 41 U/L Final   • Hemoglobin A1C 09/27/2017 5.10  4.80 - 5.60 % Final    Comment: Hemoglobin A1C Ranges:  Increased Risk for Diabetes  5.7% to 6.4%  Diabetes                     >= 6.5%  Diabetic Goal                < 7.0%     • Total Cholesterol 09/27/2017 158  0 - 200 mg/dL Final   • Triglycerides 09/27/2017 118  0 - 150 mg/dL Final   • HDL Cholesterol 09/27/2017 42  40 - 60 mg/dL Final   • VLDL Cholesterol 09/27/2017 23.6  5 - 40 mg/dL Final   • LDL Cholesterol  09/27/2017 92  0 - 100 mg/dL Final   • LDL/HDL Ratio 09/27/2017 2.20   Final   • TSH 09/27/2017 4.340* 0.270 - 4.200 mIU/mL Final   • PSA 09/27/2017 1.660  0.000 - 4.000 ng/mL Final

## 2024-05-20 ENCOUNTER — HOSPITAL ENCOUNTER (OUTPATIENT)
Dept: PHYSICAL THERAPY | Facility: HOSPITAL | Age: 69
Setting detail: THERAPIES SERIES
Discharge: HOME | End: 2024-05-20
Payer: MEDICARE

## 2024-05-20 DIAGNOSIS — G89.29 CHRONIC MIDLINE LOW BACK PAIN WITHOUT SCIATICA: ICD-10-CM

## 2024-05-20 DIAGNOSIS — M25.661 DECREASED RANGE OF MOTION OF BOTH LOWER EXTREMITIES: ICD-10-CM

## 2024-05-20 DIAGNOSIS — M25.69 DECREASED ROM OF TRUNK AND BACK: ICD-10-CM

## 2024-05-20 DIAGNOSIS — M54.42 CHRONIC MIDLINE LOW BACK PAIN WITH LEFT-SIDED SCIATICA: Primary | ICD-10-CM

## 2024-05-20 DIAGNOSIS — M53.86 DECREASED RANGE OF MOTION OF LUMBAR SPINE: ICD-10-CM

## 2024-05-20 DIAGNOSIS — G89.29 CHRONIC MIDLINE LOW BACK PAIN WITH LEFT-SIDED SCIATICA: Primary | ICD-10-CM

## 2024-05-20 DIAGNOSIS — M25.662 DECREASED RANGE OF MOTION OF BOTH LOWER EXTREMITIES: ICD-10-CM

## 2024-05-20 DIAGNOSIS — M54.50 CHRONIC MIDLINE LOW BACK PAIN WITHOUT SCIATICA: ICD-10-CM

## 2024-05-20 PROCEDURE — 97530 THERAPEUTIC ACTIVITIES: CPT

## 2024-05-20 NOTE — THERAPY DISCHARGE NOTE
Outpatient Physical Therapy Ortho Treatment Note/Discharge Summary  [facility]     Patient Name: Arash Roman  : 1955  MRN: [id number]  Today's Date: 2024      Visit Date: 2024    Visit Dx:    ICD-10-CM ICD-9-CM   1. Chronic midline low back pain with left-sided sciatica  M54.42 724.2    G89.29 724.3     338.29   2. Decreased range of motion of both lower extremities  M25.661 719.56    M25.662    3. Decreased ROM of trunk and back  M25.69 719.59   4. Decreased range of motion of lumbar spine  M53.86 724.9   5. Chronic midline low back pain without sciatica  M54.50 724.2    G89.29 338.29       Patient Active Problem List   Diagnosis    Hypothyroidism    Mixed hyperlipidemia    Depression    Peyronie's disease    Adhesive capsulitis of left shoulder    Allergic rhinitis    Vitamin D deficiency    Colon polyp    Family history of colon cancer    Essential hypertension        Past Medical History:   Diagnosis Date    Allergic rhinitis     Basal cell carcinoma     followed by Dermatology, Dr. Lopez    Colon polyp     Depression     Frozen shoulder     Hypercholesteremia     Hypertension     Hypothyroidism     Peyronie's disease     Vitamin D deficiency         Past Surgical History:   Procedure Laterality Date    COLONOSCOPY N/A 2016    Procedure: COLONOSCOPY to cecum/TI; polypectomy(cold bx);  Surgeon: Juno Steven MD;  Location: [facility] ENDOSCOPY;  Service:     COLONOSCOPY N/A 10/15/2018    Procedure: COLONOSCOPY TO CECUM/TI WITH POLYPECTOMY (COLD BX);  Surgeon: Juno Steven MD;  Location: [facility] ENDOSCOPY;  Service: Gastroenterology    COLONOSCOPY N/A 2020    Procedure: COLONOSCOPY to cecum and TI:;  Surgeon: Juno Steven MD;  Location: [facility] ENDOSCOPY;  Service: Gastroenterology;  Laterality: N/A;  family hx colon cancer, personal hx colon polyps  post:  externa; hemorrhoids,     COLONOSCOPY N/A 2023    Procedure: COLONOSCOPY TO CECUM/TI WITH COLD SNARE POLYPECTOMY;   Surgeon: Juno Steven MD;  Location: [facility] ENDOSCOPY;  Service: Gastroenterology;  Laterality: N/A;  pre: PERSONAL HX OF COLON POLYPS, FAMILY HX OF COLON CANCER  post: POLYP, INTERNAL HEMORRHOIDS    TONSILLECTOMY                          PT Assessment/Plan       Row Name 05/20/24 1300          PT Assessment    Assessment Comments Arash Roman was seen for 14 physical therapy sessions for lumbar radiculopathy and LBP.  Treatment included therapeutic exercise, therapeutic activity, and patient education with home exercise program. Progress to physical therapy goals was good. Pt met 3/3 STG and 4/5 LTG. Subjectively pt reports near full resolution of radicular shooting pains and significant reduction in morning stiffness, able to now complete HEP in am and get daily tasks done. Additionally subjective oswestry outcome measure improves from 52% at evaluation to 32% this date, suggesting a meaningful difference in overall disability. Pt returns this date following a 6 week trial of independent management, returns with good pain maintenance. He has remained adherent to HEP but comes in with questions to help improve compliance. He has much improved ability to active core with all functional movements and is very mindful of body mechanics throghout the day. Time spent discussing HEP and appropriate progressions/modifications to make based on response following discharge and optimal frequency/duration to complete HEP over next several weeks-months. Patient verbalized understanding. He was discharged to an independent HEP and provided patient education to self-manage condition.  -MO        PT Plan    PT Plan Comments d/c  -MO               User Key  (r) = Recorded By, (t) = Taken By, (c) = Cosigned By      Initials Name Provider Type    Alie Briceño, PT Physical Therapist                         OP Exercises       Row Name 05/20/24 1300             Subjective    Subjective Comments I don't have the shooting pain  anymore, I am able to do stuff in the morning now. My back still has that little ache but its much better  -MO         Total Minutes    00779 - PT Therapeutic Activity Minutes 38  Advanced HEP discussion  -MO                User Key  (r) = Recorded By, (t) = Taken By, (c) = Cosigned By      Initials Name Provider Type    Alie Briceño, PT Physical Therapist                                    PT OP Goals       Row Name 05/20/24 1300          PT Short Term Goals    STG Date to Achieve 03/01/24  -MO     STG 1 Pt. will be independent with initial HEP to improve self-management of condition.  -MO     STG 1 Progress Met  -MO     STG 2 Pt. will demonstrate proper body mechanics with forward bending/lifting activities to preserve lumbar spine with functional activities.  -MO     STG 2 Progress Met  -MO     STG 3 Pt will report >/= 25% improvement in overall symptoms for improved QOL.  -MO     STG 3 Progress Met  -MO        Long Term Goals    LTG Date to Achieve 03/31/24  -MO     LTG 1 Pt. will be independent with advanced HEP to improve long term independence with self management of condition.  -MO     LTG 1 Progress Met  -MO     LTG 2 Pt. will score </= 35%( from 52% at evaluation) on Modified Oswestry to indicate improved perception of disability.  -MO     LTG 2 Progress Met  -MO     LTG 2 Progress Comments 32%  -MO     LTG 3 Pt. will demonstrate proper TrA engagement in standing and dynamic movements to improve lumbopelvic stability.  -MO     LTG 3 Progress Met  -MO     LTG 4 Pt will improve BL gross LE strength >/=4+/5 for improved participation in recreational activities  -MO     LTG 4 Progress Ongoing;Partially Met  -MO     LTG 5 Pt will report <2 instances of shooting pain within 2 week time period, demoing improved LE flexibiltiy and overall improved QOL.  -MO     LTG 5 Progress Met  -MO               User Key  (r) = Recorded By, (t) = Taken By, (c) = Cosigned By      Initials Name Provider Type    ROMELIA Smith  Alie, PT Physical Therapist                            Modified Oswestry  Modified Oswestry Score/Comments: 16/50      Time Calculation:   Start Time: 1230  Stop Time: 1308  Time Calculation (min): 38 min  Timed Charges  39056 - PT Therapeutic Activity Minutes: 38 (Advanced HEP discussion)  Total Minutes  Timed Charges Total Minutes: 38   Total Minutes: 38  Therapy Charges for Today       Code Description Service Date Service Provider Modifiers Qty    38227264474 HC PT THERAPEUTIC ACT EA 15 MIN 5/20/2024 Alie Smith, PT GP 3            PT G-Codes  Modified Oswestry Score/Comments: 16/50            Alie Smith PT  5/20/2024

## 2024-07-24 DIAGNOSIS — E78.5 HYPERLIPIDEMIA, UNSPECIFIED HYPERLIPIDEMIA TYPE: ICD-10-CM

## 2024-07-24 RX ORDER — ROSUVASTATIN CALCIUM 5 MG/1
5 TABLET, COATED ORAL DAILY
Qty: 90 TABLET | Refills: 1 | OUTPATIENT
Start: 2024-07-24

## 2024-08-23 DIAGNOSIS — I10 ESSENTIAL HYPERTENSION: ICD-10-CM

## 2024-08-23 RX ORDER — LISINOPRIL 10 MG/1
10 TABLET ORAL DAILY
Qty: 90 TABLET | Refills: 1 | Status: SHIPPED | OUTPATIENT
Start: 2024-08-23

## 2024-08-26 DIAGNOSIS — E78.5 HYPERLIPIDEMIA, UNSPECIFIED HYPERLIPIDEMIA TYPE: ICD-10-CM

## 2024-08-26 DIAGNOSIS — E03.9 HYPOTHYROIDISM, UNSPECIFIED TYPE: ICD-10-CM

## 2024-08-26 RX ORDER — LEVOTHYROXINE SODIUM 88 UG/1
88 TABLET ORAL DAILY
Qty: 90 TABLET | Refills: 1 | Status: SHIPPED | OUTPATIENT
Start: 2024-08-26

## 2024-08-26 RX ORDER — ROSUVASTATIN CALCIUM 5 MG/1
5 TABLET, COATED ORAL DAILY
Qty: 90 TABLET | Refills: 1 | Status: SHIPPED | OUTPATIENT
Start: 2024-08-26

## (undated) DEVICE — KT ORCA ORCAPOD DISP STRL

## (undated) DEVICE — SENSR O2 OXIMAX FNGR A/ 18IN NONSTR

## (undated) DEVICE — TUBING, SUCTION, 1/4" X 10', STRAIGHT: Brand: MEDLINE

## (undated) DEVICE — LN SMPL CO2 SHTRM SD STREAM W/M LUER

## (undated) DEVICE — CANN O2 ETCO2 FITS ALL CONN CO2 SMPL A/ 7IN DISP LF

## (undated) DEVICE — THE SINGLE USE ETRAP – POLYP TRAP IS USED FOR SUCTION RETRIEVAL OF ENDOSCOPICALLY REMOVED POLYPS.: Brand: ETRAP

## (undated) DEVICE — CANN NASL CO2 TRULINK W/O2 A/

## (undated) DEVICE — ADAPT CLN BIOGUARD AIR/H2O DISP

## (undated) DEVICE — SINGLE-USE BIOPSY FORCEPS: Brand: RADIAL JAW 4

## (undated) DEVICE — THE TORRENT IRRIGATION SCOPE CONNECTOR IS USED WITH THE TORRENT IRRIGATION TUBING TO PROVIDE IRRIGATION FLUIDS SUCH AS STERILE WATER DURING GASTROINTESTINAL ENDOSCOPIC PROCEDURES WHEN USED IN CONJUNCTION WITH AN IRRIGATION PUMP (OR ELECTROSURGICAL UNIT).: Brand: TORRENT

## (undated) DEVICE — Device: Brand: DEFENDO AIR/WATER/SUCTION AND BIOPSY VALVE

## (undated) DEVICE — SNAR POLYP CAPTIVATOR RND STFF 2.4 240CM 10MM 1P/U